# Patient Record
Sex: MALE | Race: BLACK OR AFRICAN AMERICAN | NOT HISPANIC OR LATINO | URBAN - METROPOLITAN AREA
[De-identification: names, ages, dates, MRNs, and addresses within clinical notes are randomized per-mention and may not be internally consistent; named-entity substitution may affect disease eponyms.]

---

## 2021-03-03 ENCOUNTER — OFFICE VISIT (OUTPATIENT)
Dept: DERMATOLOGY | Facility: CLINIC | Age: 41
End: 2021-03-03
Payer: COMMERCIAL

## 2021-03-03 ENCOUNTER — PATIENT MESSAGE (OUTPATIENT)
Dept: DERMATOLOGY | Facility: CLINIC | Age: 41
End: 2021-03-03

## 2021-03-03 DIAGNOSIS — L20.89 OTHER ATOPIC DERMATITIS: Primary | ICD-10-CM

## 2021-03-03 DIAGNOSIS — Z79.899 ENCOUNTER FOR LONG-TERM (CURRENT) USE OF MEDICATIONS: ICD-10-CM

## 2021-03-03 PROCEDURE — 99204 OFFICE O/P NEW MOD 45 MIN: CPT | Mod: 95,,, | Performed by: DERMATOLOGY

## 2021-03-03 PROCEDURE — 99204 PR OFFICE/OUTPT VISIT, NEW, LEVL IV, 45-59 MIN: ICD-10-PCS | Mod: 95,,, | Performed by: DERMATOLOGY

## 2021-03-03 RX ORDER — TRIAMCINOLONE ACETONIDE 1 MG/G
OINTMENT TOPICAL
COMMUNITY
End: 2022-04-21 | Stop reason: SDUPTHER

## 2021-03-04 ENCOUNTER — LAB VISIT (OUTPATIENT)
Dept: LAB | Facility: HOSPITAL | Age: 41
End: 2021-03-04
Attending: DERMATOLOGY
Payer: COMMERCIAL

## 2021-03-04 DIAGNOSIS — L20.89 OTHER ATOPIC DERMATITIS: ICD-10-CM

## 2021-03-04 DIAGNOSIS — Z79.899 ENCOUNTER FOR LONG-TERM (CURRENT) USE OF MEDICATIONS: ICD-10-CM

## 2021-03-04 PROCEDURE — 85025 COMPLETE CBC W/AUTO DIFF WBC: CPT | Performed by: DERMATOLOGY

## 2021-03-04 PROCEDURE — 36415 COLL VENOUS BLD VENIPUNCTURE: CPT | Performed by: DERMATOLOGY

## 2021-03-04 PROCEDURE — 80053 COMPREHEN METABOLIC PANEL: CPT | Performed by: DERMATOLOGY

## 2021-03-05 LAB
ALBUMIN SERPL BCP-MCNC: 4 G/DL (ref 3.5–5.2)
ALP SERPL-CCNC: 78 U/L (ref 55–135)
ALT SERPL W/O P-5'-P-CCNC: 46 U/L (ref 10–44)
ANION GAP SERPL CALC-SCNC: 11 MMOL/L (ref 8–16)
AST SERPL-CCNC: 36 U/L (ref 10–40)
BASOPHILS # BLD AUTO: 0.11 K/UL (ref 0–0.2)
BASOPHILS NFR BLD: 1.6 % (ref 0–1.9)
BILIRUB SERPL-MCNC: 0.7 MG/DL (ref 0.1–1)
BUN SERPL-MCNC: 16 MG/DL (ref 6–20)
CALCIUM SERPL-MCNC: 10.2 MG/DL (ref 8.7–10.5)
CHLORIDE SERPL-SCNC: 103 MMOL/L (ref 95–110)
CO2 SERPL-SCNC: 25 MMOL/L (ref 23–29)
CREAT SERPL-MCNC: 0.9 MG/DL (ref 0.5–1.4)
DIFFERENTIAL METHOD: ABNORMAL
EOSINOPHIL # BLD AUTO: 0.7 K/UL (ref 0–0.5)
EOSINOPHIL NFR BLD: 9.3 % (ref 0–8)
ERYTHROCYTE [DISTWIDTH] IN BLOOD BY AUTOMATED COUNT: 13.5 % (ref 11.5–14.5)
EST. GFR  (AFRICAN AMERICAN): >60 ML/MIN/1.73 M^2
EST. GFR  (NON AFRICAN AMERICAN): >60 ML/MIN/1.73 M^2
GLUCOSE SERPL-MCNC: 89 MG/DL (ref 70–110)
HCT VFR BLD AUTO: 44.4 % (ref 40–54)
HGB BLD-MCNC: 14.6 G/DL (ref 14–18)
IMM GRANULOCYTES # BLD AUTO: 0.01 K/UL (ref 0–0.04)
IMM GRANULOCYTES NFR BLD AUTO: 0.1 % (ref 0–0.5)
LYMPHOCYTES # BLD AUTO: 2.4 K/UL (ref 1–4.8)
LYMPHOCYTES NFR BLD: 33.6 % (ref 18–48)
MCH RBC QN AUTO: 30.4 PG (ref 27–31)
MCHC RBC AUTO-ENTMCNC: 32.9 G/DL (ref 32–36)
MCV RBC AUTO: 92 FL (ref 82–98)
MONOCYTES # BLD AUTO: 0.7 K/UL (ref 0.3–1)
MONOCYTES NFR BLD: 10.1 % (ref 4–15)
NEUTROPHILS # BLD AUTO: 3.2 K/UL (ref 1.8–7.7)
NEUTROPHILS NFR BLD: 45.3 % (ref 38–73)
NRBC BLD-RTO: 0 /100 WBC
PLATELET # BLD AUTO: 403 K/UL (ref 150–350)
PMV BLD AUTO: 10.2 FL (ref 9.2–12.9)
POTASSIUM SERPL-SCNC: 3.9 MMOL/L (ref 3.5–5.1)
PROT SERPL-MCNC: 8.6 G/DL (ref 6–8.4)
RBC # BLD AUTO: 4.81 M/UL (ref 4.6–6.2)
SODIUM SERPL-SCNC: 139 MMOL/L (ref 136–145)
WBC # BLD AUTO: 7 K/UL (ref 3.9–12.7)

## 2021-03-08 DIAGNOSIS — L20.89 OTHER ATOPIC DERMATITIS: Primary | ICD-10-CM

## 2021-03-08 DIAGNOSIS — Z79.899 ENCOUNTER FOR LONG-TERM (CURRENT) USE OF MEDICATIONS: ICD-10-CM

## 2021-03-08 RX ORDER — DUPILUMAB 300 MG/2ML
INJECTION, SOLUTION SUBCUTANEOUS
Qty: 4 ML | Refills: 2 | Status: SHIPPED | OUTPATIENT
Start: 2021-03-08 | End: 2021-07-27 | Stop reason: SDUPTHER

## 2021-03-08 RX ORDER — DUPILUMAB 300 MG/2ML
INJECTION, SOLUTION SUBCUTANEOUS
Qty: 8 ML | Refills: 0 | Status: SHIPPED | OUTPATIENT
Start: 2021-03-08 | End: 2021-09-10

## 2021-03-11 ENCOUNTER — SPECIALTY PHARMACY (OUTPATIENT)
Dept: PHARMACY | Facility: CLINIC | Age: 41
End: 2021-03-11

## 2021-03-11 DIAGNOSIS — L20.9 ATOPIC DERMATITIS, UNSPECIFIED TYPE: Primary | ICD-10-CM

## 2021-03-20 ENCOUNTER — IMMUNIZATION (OUTPATIENT)
Dept: INTERNAL MEDICINE | Facility: CLINIC | Age: 41
End: 2021-03-20
Payer: COMMERCIAL

## 2021-03-20 DIAGNOSIS — Z23 NEED FOR VACCINATION: Primary | ICD-10-CM

## 2021-03-20 PROCEDURE — 91300 COVID-19, MRNA, LNP-S, PF, 30 MCG/0.3 ML DOSE VACCINE: CPT | Mod: PBBFAC | Performed by: FAMILY MEDICINE

## 2021-03-22 ENCOUNTER — SPECIALTY PHARMACY (OUTPATIENT)
Dept: PHARMACY | Facility: CLINIC | Age: 41
End: 2021-03-22

## 2021-03-22 DIAGNOSIS — L20.9 ATOPIC DERMATITIS, UNSPECIFIED TYPE: Primary | ICD-10-CM

## 2021-04-10 ENCOUNTER — IMMUNIZATION (OUTPATIENT)
Dept: INTERNAL MEDICINE | Facility: CLINIC | Age: 41
End: 2021-04-10
Payer: COMMERCIAL

## 2021-04-10 DIAGNOSIS — Z23 NEED FOR VACCINATION: Primary | ICD-10-CM

## 2021-04-10 PROCEDURE — 91300 COVID-19, MRNA, LNP-S, PF, 30 MCG/0.3 ML DOSE VACCINE: CPT | Mod: PBBFAC | Performed by: FAMILY MEDICINE

## 2021-04-10 PROCEDURE — 0002A COVID-19, MRNA, LNP-S, PF, 30 MCG/0.3 ML DOSE VACCINE: CPT | Mod: PBBFAC | Performed by: FAMILY MEDICINE

## 2021-04-26 ENCOUNTER — SPECIALTY PHARMACY (OUTPATIENT)
Dept: PHARMACY | Facility: CLINIC | Age: 41
End: 2021-04-26

## 2021-05-27 ENCOUNTER — SPECIALTY PHARMACY (OUTPATIENT)
Dept: PHARMACY | Facility: CLINIC | Age: 41
End: 2021-05-27

## 2021-06-24 ENCOUNTER — SPECIALTY PHARMACY (OUTPATIENT)
Dept: PHARMACY | Facility: CLINIC | Age: 41
End: 2021-06-24

## 2021-06-24 ENCOUNTER — PATIENT MESSAGE (OUTPATIENT)
Dept: PHARMACY | Facility: CLINIC | Age: 41
End: 2021-06-24

## 2021-07-27 ENCOUNTER — SPECIALTY PHARMACY (OUTPATIENT)
Dept: PHARMACY | Facility: CLINIC | Age: 41
End: 2021-07-27

## 2021-07-27 DIAGNOSIS — Z79.899 ENCOUNTER FOR LONG-TERM (CURRENT) USE OF MEDICATIONS: ICD-10-CM

## 2021-07-27 DIAGNOSIS — L20.89 OTHER ATOPIC DERMATITIS: ICD-10-CM

## 2021-07-27 RX ORDER — DUPILUMAB 300 MG/2ML
INJECTION, SOLUTION SUBCUTANEOUS
Qty: 4 ML | Refills: 0 | Status: SHIPPED | OUTPATIENT
Start: 2021-07-27 | End: 2021-09-07 | Stop reason: SDUPTHER

## 2021-07-29 ENCOUNTER — SPECIALTY PHARMACY (OUTPATIENT)
Dept: PHARMACY | Facility: CLINIC | Age: 41
End: 2021-07-29

## 2021-07-29 ENCOUNTER — PATIENT MESSAGE (OUTPATIENT)
Dept: PHARMACY | Facility: CLINIC | Age: 41
End: 2021-07-29

## 2021-09-07 DIAGNOSIS — L20.89 OTHER ATOPIC DERMATITIS: ICD-10-CM

## 2021-09-07 DIAGNOSIS — Z79.899 ENCOUNTER FOR LONG-TERM (CURRENT) USE OF MEDICATIONS: ICD-10-CM

## 2021-09-07 RX ORDER — DUPILUMAB 300 MG/2ML
INJECTION, SOLUTION SUBCUTANEOUS
Qty: 4 ML | Refills: 0 | Status: SHIPPED | OUTPATIENT
Start: 2021-09-07 | End: 2021-09-10 | Stop reason: SDUPTHER

## 2021-09-10 ENCOUNTER — OFFICE VISIT (OUTPATIENT)
Dept: DERMATOLOGY | Facility: CLINIC | Age: 41
End: 2021-09-10
Payer: COMMERCIAL

## 2021-09-10 ENCOUNTER — SPECIALTY PHARMACY (OUTPATIENT)
Dept: PHARMACY | Facility: CLINIC | Age: 41
End: 2021-09-10

## 2021-09-10 DIAGNOSIS — Z91.013 FISH ALLERGY: ICD-10-CM

## 2021-09-10 DIAGNOSIS — Z79.899 ENCOUNTER FOR LONG-TERM (CURRENT) USE OF MEDICATIONS: ICD-10-CM

## 2021-09-10 DIAGNOSIS — L20.89 OTHER ATOPIC DERMATITIS: Primary | ICD-10-CM

## 2021-09-10 PROCEDURE — 1160F PR REVIEW ALL MEDS BY PRESCRIBER/CLIN PHARMACIST DOCUMENTED: ICD-10-PCS | Mod: CPTII,,, | Performed by: DERMATOLOGY

## 2021-09-10 PROCEDURE — 1159F MED LIST DOCD IN RCRD: CPT | Mod: CPTII,,, | Performed by: DERMATOLOGY

## 2021-09-10 PROCEDURE — 99214 PR OFFICE/OUTPT VISIT, EST, LEVL IV, 30-39 MIN: ICD-10-PCS | Mod: 95,,, | Performed by: DERMATOLOGY

## 2021-09-10 PROCEDURE — 1159F PR MEDICATION LIST DOCUMENTED IN MEDICAL RECORD: ICD-10-PCS | Mod: CPTII,,, | Performed by: DERMATOLOGY

## 2021-09-10 PROCEDURE — 99214 OFFICE O/P EST MOD 30 MIN: CPT | Mod: 95,,, | Performed by: DERMATOLOGY

## 2021-09-10 PROCEDURE — 1160F RVW MEDS BY RX/DR IN RCRD: CPT | Mod: CPTII,,, | Performed by: DERMATOLOGY

## 2021-09-10 RX ORDER — DUPILUMAB 300 MG/2ML
INJECTION, SOLUTION SUBCUTANEOUS
Qty: 4 ML | Refills: 5 | Status: SHIPPED | OUTPATIENT
Start: 2021-09-10 | End: 2022-02-23 | Stop reason: SDUPTHER

## 2021-09-15 ENCOUNTER — LAB VISIT (OUTPATIENT)
Dept: LAB | Facility: HOSPITAL | Age: 41
End: 2021-09-15
Attending: DERMATOLOGY
Payer: COMMERCIAL

## 2021-09-15 DIAGNOSIS — L20.89 OTHER ATOPIC DERMATITIS: ICD-10-CM

## 2021-09-15 DIAGNOSIS — Z79.899 ENCOUNTER FOR LONG-TERM (CURRENT) USE OF MEDICATIONS: ICD-10-CM

## 2021-09-15 LAB
ALBUMIN SERPL BCP-MCNC: 3.9 G/DL (ref 3.5–5.2)
ALP SERPL-CCNC: 70 U/L (ref 55–135)
ALT SERPL W/O P-5'-P-CCNC: 33 U/L (ref 10–44)
ANION GAP SERPL CALC-SCNC: 10 MMOL/L (ref 8–16)
AST SERPL-CCNC: 26 U/L (ref 10–40)
BASOPHILS # BLD AUTO: 0.14 K/UL (ref 0–0.2)
BASOPHILS NFR BLD: 2.1 % (ref 0–1.9)
BILIRUB SERPL-MCNC: 0.8 MG/DL (ref 0.1–1)
BUN SERPL-MCNC: 8 MG/DL (ref 6–20)
CALCIUM SERPL-MCNC: 10.4 MG/DL (ref 8.7–10.5)
CHLORIDE SERPL-SCNC: 103 MMOL/L (ref 95–110)
CO2 SERPL-SCNC: 23 MMOL/L (ref 23–29)
CREAT SERPL-MCNC: 0.9 MG/DL (ref 0.5–1.4)
DIFFERENTIAL METHOD: ABNORMAL
EOSINOPHIL # BLD AUTO: 0.4 K/UL (ref 0–0.5)
EOSINOPHIL NFR BLD: 5.6 % (ref 0–8)
ERYTHROCYTE [DISTWIDTH] IN BLOOD BY AUTOMATED COUNT: 13.2 % (ref 11.5–14.5)
EST. GFR  (AFRICAN AMERICAN): >60 ML/MIN/1.73 M^2
EST. GFR  (NON AFRICAN AMERICAN): >60 ML/MIN/1.73 M^2
GLUCOSE SERPL-MCNC: 191 MG/DL (ref 70–110)
HCT VFR BLD AUTO: 40 % (ref 40–54)
HGB BLD-MCNC: 13.6 G/DL (ref 14–18)
IMM GRANULOCYTES # BLD AUTO: 0.02 K/UL (ref 0–0.04)
IMM GRANULOCYTES NFR BLD AUTO: 0.3 % (ref 0–0.5)
LYMPHOCYTES # BLD AUTO: 2.1 K/UL (ref 1–4.8)
LYMPHOCYTES NFR BLD: 32 % (ref 18–48)
MCH RBC QN AUTO: 29.8 PG (ref 27–31)
MCHC RBC AUTO-ENTMCNC: 34 G/DL (ref 32–36)
MCV RBC AUTO: 88 FL (ref 82–98)
MONOCYTES # BLD AUTO: 0.7 K/UL (ref 0.3–1)
MONOCYTES NFR BLD: 10.9 % (ref 4–15)
NEUTROPHILS # BLD AUTO: 3.2 K/UL (ref 1.8–7.7)
NEUTROPHILS NFR BLD: 49.1 % (ref 38–73)
NRBC BLD-RTO: 0 /100 WBC
PLATELET # BLD AUTO: 345 K/UL (ref 150–450)
PMV BLD AUTO: 9.7 FL (ref 9.2–12.9)
POTASSIUM SERPL-SCNC: 3.6 MMOL/L (ref 3.5–5.1)
PROT SERPL-MCNC: 8.2 G/DL (ref 6–8.4)
RBC # BLD AUTO: 4.56 M/UL (ref 4.6–6.2)
SODIUM SERPL-SCNC: 136 MMOL/L (ref 136–145)
WBC # BLD AUTO: 6.6 K/UL (ref 3.9–12.7)

## 2021-09-15 PROCEDURE — 85025 COMPLETE CBC W/AUTO DIFF WBC: CPT | Performed by: DERMATOLOGY

## 2021-09-15 PROCEDURE — 80053 COMPREHEN METABOLIC PANEL: CPT | Performed by: DERMATOLOGY

## 2021-09-15 PROCEDURE — 36415 COLL VENOUS BLD VENIPUNCTURE: CPT | Performed by: DERMATOLOGY

## 2021-09-16 ENCOUNTER — SPECIALTY PHARMACY (OUTPATIENT)
Dept: PHARMACY | Facility: CLINIC | Age: 41
End: 2021-09-16

## 2021-09-20 ENCOUNTER — LAB VISIT (OUTPATIENT)
Dept: LAB | Facility: HOSPITAL | Age: 41
End: 2021-09-20
Attending: ALLERGY & IMMUNOLOGY
Payer: COMMERCIAL

## 2021-09-20 ENCOUNTER — OFFICE VISIT (OUTPATIENT)
Dept: ALLERGY | Facility: CLINIC | Age: 41
End: 2021-09-20
Payer: COMMERCIAL

## 2021-09-20 VITALS
WEIGHT: 315 LBS | DIASTOLIC BLOOD PRESSURE: 100 MMHG | BODY MASS INDEX: 42.66 KG/M2 | HEIGHT: 72 IN | SYSTOLIC BLOOD PRESSURE: 146 MMHG | TEMPERATURE: 98 F | HEART RATE: 107 BPM

## 2021-09-20 DIAGNOSIS — Z91.013 FISH ALLERGY: Primary | ICD-10-CM

## 2021-09-20 DIAGNOSIS — J30.1 SEASONAL ALLERGIC RHINITIS DUE TO POLLEN: ICD-10-CM

## 2021-09-20 DIAGNOSIS — T78.1XXD POLLEN-FOOD ALLERGY, SUBSEQUENT ENCOUNTER: ICD-10-CM

## 2021-09-20 DIAGNOSIS — Z91.013 FISH ALLERGY: ICD-10-CM

## 2021-09-20 DIAGNOSIS — Z91.018 FOOD ALLERGY: ICD-10-CM

## 2021-09-20 PROCEDURE — 36415 COLL VENOUS BLD VENIPUNCTURE: CPT | Performed by: ALLERGY & IMMUNOLOGY

## 2021-09-20 PROCEDURE — 3077F SYST BP >= 140 MM HG: CPT | Mod: CPTII,S$GLB,, | Performed by: ALLERGY & IMMUNOLOGY

## 2021-09-20 PROCEDURE — 3077F PR MOST RECENT SYSTOLIC BLOOD PRESSURE >= 140 MM HG: ICD-10-PCS | Mod: CPTII,S$GLB,, | Performed by: ALLERGY & IMMUNOLOGY

## 2021-09-20 PROCEDURE — 82785 ASSAY OF IGE: CPT | Performed by: ALLERGY & IMMUNOLOGY

## 2021-09-20 PROCEDURE — 3080F PR MOST RECENT DIASTOLIC BLOOD PRESSURE >= 90 MM HG: ICD-10-PCS | Mod: CPTII,S$GLB,, | Performed by: ALLERGY & IMMUNOLOGY

## 2021-09-20 PROCEDURE — 86003 ALLG SPEC IGE CRUDE XTRC EA: CPT | Mod: 59 | Performed by: ALLERGY & IMMUNOLOGY

## 2021-09-20 PROCEDURE — 99999 PR PBB SHADOW E&M-EST. PATIENT-LVL IV: ICD-10-PCS | Mod: PBBFAC,,, | Performed by: ALLERGY & IMMUNOLOGY

## 2021-09-20 PROCEDURE — 86003 ALLG SPEC IGE CRUDE XTRC EA: CPT | Performed by: ALLERGY & IMMUNOLOGY

## 2021-09-20 PROCEDURE — 99999 PR PBB SHADOW E&M-EST. PATIENT-LVL IV: CPT | Mod: PBBFAC,,, | Performed by: ALLERGY & IMMUNOLOGY

## 2021-09-20 PROCEDURE — 1159F MED LIST DOCD IN RCRD: CPT | Mod: CPTII,S$GLB,, | Performed by: ALLERGY & IMMUNOLOGY

## 2021-09-20 PROCEDURE — 99204 OFFICE O/P NEW MOD 45 MIN: CPT | Mod: S$GLB,,, | Performed by: ALLERGY & IMMUNOLOGY

## 2021-09-20 PROCEDURE — 3008F PR BODY MASS INDEX (BMI) DOCUMENTED: ICD-10-PCS | Mod: CPTII,S$GLB,, | Performed by: ALLERGY & IMMUNOLOGY

## 2021-09-20 PROCEDURE — 3008F BODY MASS INDEX DOCD: CPT | Mod: CPTII,S$GLB,, | Performed by: ALLERGY & IMMUNOLOGY

## 2021-09-20 PROCEDURE — 3080F DIAST BP >= 90 MM HG: CPT | Mod: CPTII,S$GLB,, | Performed by: ALLERGY & IMMUNOLOGY

## 2021-09-20 PROCEDURE — 99204 PR OFFICE/OUTPT VISIT, NEW, LEVL IV, 45-59 MIN: ICD-10-PCS | Mod: S$GLB,,, | Performed by: ALLERGY & IMMUNOLOGY

## 2021-09-20 PROCEDURE — 1159F PR MEDICATION LIST DOCUMENTED IN MEDICAL RECORD: ICD-10-PCS | Mod: CPTII,S$GLB,, | Performed by: ALLERGY & IMMUNOLOGY

## 2021-09-20 RX ORDER — AMOXICILLIN 500 MG/1
CAPSULE ORAL
COMMUNITY
Start: 2021-09-09 | End: 2023-02-23

## 2021-09-20 RX ORDER — OLMESARTAN MEDOXOMIL, AMLODIPINE AND HYDROCHLOROTHIAZIDE TABLET 40/10/25 MG 40; 10; 25 MG/1; MG/1; MG/1
1 TABLET ORAL DAILY
COMMUNITY
Start: 2021-08-06 | End: 2022-08-04 | Stop reason: SDUPTHER

## 2021-09-20 RX ORDER — IBUPROFEN 800 MG/1
800 TABLET ORAL EVERY 8 HOURS PRN
COMMUNITY
Start: 2021-09-09 | End: 2022-08-04

## 2021-09-20 RX ORDER — TIZANIDINE 4 MG/1
4 TABLET ORAL NIGHTLY
COMMUNITY
Start: 2021-07-20

## 2021-09-20 RX ORDER — ACETAMINOPHEN AND CODEINE PHOSPHATE 300; 30 MG/1; MG/1
1 TABLET ORAL
COMMUNITY
Start: 2021-09-09 | End: 2023-03-30

## 2021-09-20 RX ORDER — EPINEPHRINE 0.3 MG/.3ML
1 INJECTION SUBCUTANEOUS ONCE
Qty: 2 DEVICE | Refills: 3 | Status: SHIPPED | OUTPATIENT
Start: 2021-09-20 | End: 2023-11-02

## 2021-09-21 ENCOUNTER — PATIENT MESSAGE (OUTPATIENT)
Dept: ALLERGY | Facility: CLINIC | Age: 41
End: 2021-09-21

## 2021-09-21 LAB — IGE SERPL-ACNC: 198 IU/ML (ref 0–100)

## 2021-09-22 LAB — AMER SYCAMORE IGE QN: 1.09 KU/L

## 2021-09-23 LAB
A ALTERNATA IGE QN: 0.31 KU/L
A FUMIGATUS IGE QN: 0.31 KU/L
ALLERGEN BOXELDER MAPLE TREE IGE: 1.25 KU/L
ALLERGEN CHAETOMIUM GLOBOSUM IGE: <0.1 KU/L
ALLERGEN MAPLE (BOX ELDER) CLASS: ABNORMAL
ALLERGEN MULBERRY CLASS: ABNORMAL
ALLERGEN MULBERRY TREE IGE: 0.26 KU/L
ALLERGEN WHITE ASH TREE IGE: 1.27 KU/L
ALLERGEN WHITE PINE TREE IGE: 0.1 KU/L
BAHIA GRASS IGE QN: 21.7 KU/L
BALD CYPRESS IGE QN: 0.12 KU/L
C HERBARUM IGE QN: 0.11 KU/L
C LUNATA IGE QN: 0.44 KU/L
CAT DANDER IGE QN: <0.1 KU/L
CATFISH IGE QN: 0.56 KU/L
CHAETOMIUM GLOB. CLASS: NORMAL
COCKLEBUR IGE QN: 2.78 KU/L
COCKSFOOT IGE QN: 13.7 KU/L
CODFISH IGE QN: 0.49 KU/L
COMMON RAGWEED IGE QN: 20.5 KU/L
COTTONWOOD IGE QN: 0.66 KU/L
D FARINAE IGE QN: 0.22 KU/L
D PTERONYSS IGE QN: 0.3 KU/L
DEPRECATED A ALTERNATA IGE RAST QL: ABNORMAL
DEPRECATED A FUMIGATUS IGE RAST QL: ABNORMAL
DEPRECATED BAHIA GRASS IGE RAST QL: ABNORMAL
DEPRECATED BALD CYPRESS IGE RAST QL: ABNORMAL
DEPRECATED C HERBARUM IGE RAST QL: ABNORMAL
DEPRECATED C LUNATA IGE RAST QL: ABNORMAL
DEPRECATED CAT DANDER IGE RAST QL: NORMAL
DEPRECATED CATFISH IGE RAST QL: ABNORMAL
DEPRECATED COCKLEBUR IGE RAST QL: ABNORMAL
DEPRECATED COCKSFOOT IGE RAST QL: ABNORMAL
DEPRECATED CODFISH IGE RAST QL: ABNORMAL
DEPRECATED COMMON RAGWEED IGE RAST QL: ABNORMAL
DEPRECATED COTTONWOOD IGE RAST QL: ABNORMAL
DEPRECATED D FARINAE IGE RAST QL: ABNORMAL
DEPRECATED D PTERONYSS IGE RAST QL: ABNORMAL
DEPRECATED DOG DANDER IGE RAST QL: ABNORMAL
DEPRECATED ELDER IGE RAST QL: ABNORMAL
DEPRECATED ENGL PLANTAIN IGE RAST QL: ABNORMAL
DEPRECATED GUM-TREE IGE RAST QL: ABNORMAL
DEPRECATED HACKBERRY TREE IGE RAST QL: ABNORMAL
DEPRECATED JOHNSON GRASS IGE RAST QL: ABNORMAL
DEPRECATED KENT BLUE GRASS IGE RAST QL: ABNORMAL
DEPRECATED LONDON PLANE IGE RAST QL: ABNORMAL
DEPRECATED MUGWORT IGE RAST QL: ABNORMAL
DEPRECATED NETTLE IGE RAST QL: ABNORMAL
DEPRECATED PECAN/HICK NUT IGE RAST QL: NORMAL
DEPRECATED PECAN/HICK TREE IGE RAST QL: ABNORMAL
DEPRECATED PER RYE GRASS IGE RAST QL: ABNORMAL
DEPRECATED PRIVET IGE RAST QL: ABNORMAL
DEPRECATED RED TOP GRASS IGE RAST QL: ABNORMAL
DEPRECATED ROACH IGE RAST QL: ABNORMAL
DEPRECATED SALMON IGE RAST QL: ABNORMAL
DEPRECATED SALTWORT IGE RAST QL: ABNORMAL
DEPRECATED SHEEP SORREL IGE RAST QL: ABNORMAL
DEPRECATED SILVER BIRCH IGE RAST QL: ABNORMAL
DEPRECATED TIMOTHY IGE RAST QL: ABNORMAL
DEPRECATED TUNA IGE RAST QL: NORMAL
DEPRECATED WALNUT IGE RAST QL: ABNORMAL
DEPRECATED WHITE OAK IGE RAST QL: ABNORMAL
DEPRECATED WILLOW IGE RAST QL: ABNORMAL
DOG DANDER IGE QN: 0.19 KU/L
ELDER IGE QN: 0.55 KU/L
ELM CEDAR CLASS: ABNORMAL
ELM CEDAR, IGE: 0.56 KU/L
ENGL PLANTAIN IGE QN: 0.5 KU/L
GUM-TREE IGE QN: 0.71 KU/L
HACKBERRY TREE IGE QN: 1.38 KU/L
JOHNSON GRASS IGE QN: 8.25 KU/L
KENT BLUE GRASS IGE QN: 18.4 KU/L
LONDON PLANE IGE QN: 0.81 KU/L
MUGWORT IGE QN: 0.49 KU/L
NETTLE IGE QN: 0.28 KU/L
PECAN/HICK NUT IGE QN: <0.1 KU/L
PECAN/HICK TREE IGE QN: 6.91 KU/L
PER RYE GRASS IGE QN: 17.2 KU/L
PRIVET IGE QN: 0.28 KU/L
RED TOP GRASS IGE QN: 14.7 KU/L
ROACH IGE QN: 0.19 KU/L
SALMON IGE QN: 0.25 KU/L
SALTWORT IGE QN: 0.39 KU/L
SHEEP SORREL IGE QN: 0.39 KU/L
SILVER BIRCH IGE QN: 7.63 KU/L
STEMPHYLIUM HERBARUM CLASS: ABNORMAL
STEMPHYLLIUM, IGE: 0.44 KU/L
TIMOTHY IGE QN: 15.8 KU/L
TUNA IGE QN: 0.1 KU/L
WALNUT IGE QN: 0.22 KU/L
WHITE ASH CLASS: ABNORMAL
WHITE OAK IGE QN: 4.3 KU/L
WHITE PINE CLASS: NORMAL
WILLOW IGE QN: 0.42 KU/L

## 2021-10-08 ENCOUNTER — SPECIALTY PHARMACY (OUTPATIENT)
Dept: PHARMACY | Facility: CLINIC | Age: 41
End: 2021-10-08

## 2021-11-04 ENCOUNTER — SPECIALTY PHARMACY (OUTPATIENT)
Dept: PHARMACY | Facility: CLINIC | Age: 41
End: 2021-11-04
Payer: COMMERCIAL

## 2021-12-03 ENCOUNTER — SPECIALTY PHARMACY (OUTPATIENT)
Dept: PHARMACY | Facility: CLINIC | Age: 41
End: 2021-12-03
Payer: COMMERCIAL

## 2021-12-30 ENCOUNTER — SPECIALTY PHARMACY (OUTPATIENT)
Dept: PHARMACY | Facility: CLINIC | Age: 41
End: 2021-12-30
Payer: COMMERCIAL

## 2022-01-27 ENCOUNTER — PATIENT MESSAGE (OUTPATIENT)
Dept: PHARMACY | Facility: CLINIC | Age: 42
End: 2022-01-27
Payer: COMMERCIAL

## 2022-01-27 ENCOUNTER — SPECIALTY PHARMACY (OUTPATIENT)
Dept: PHARMACY | Facility: CLINIC | Age: 42
End: 2022-01-27
Payer: COMMERCIAL

## 2022-01-28 NOTE — TELEPHONE ENCOUNTER
Specialty Pharmacy - Refill Coordination    Specialty Medication Orders Linked to Encounter    Flowsheet Row Most Recent Value   Medication #1 DUPIXENT SYRINGE 300 mg/2 mL Syrg (Order#[id number], Rx#[id number])          Refill Questions - Documented Responses    Flowsheet Row Most Recent Value   Patient Availability and HIPAA Verification    Does patient want to proceed with activity? Yes   HIPAA/medical authority confirmed? Yes   Relationship to patient of person spoken to? Self   Refill Screening Questions    Changes to allergies? No   Changes to medications? No   Unplanned office visit, urgent care, ED, or hospital admission in the last 4 weeks? No   Financial problems or insurance changes? No   How many doses of your specialty medications were missed in the last 4 weeks? 0   Would patient like to speak to a pharmacist? No   When does the patient need to receive the medication? 02/02/22   Refill Delivery Questions    How will the patient receive the medication? Delivery Jacklyn   When does the patient need to receive the medication? 02/02/22   Shipping Address Home   Address in [facility] confirmed and updated if neccessary? Yes   Expected Copay ($) 0   Is the patient able to afford the medication copay? Yes   Payment Method zero copay   Days supply of Refill 28   Supplies needed? No supplies needed   Refill activity completed? Yes   Refill activity plan Refill scheduled   Shipment/Pickup Date: 02/01/22          Current Outpatient Medications   Medication Sig    acetaminophen-codeine 300-30mg (TYLENOL #3) 300-30 mg Tab Take 1 tablet by mouth every 4 to 6 hours as needed.    amoxicillin (AMOXIL) 500 MG capsule TAKE 1 CAPSULE BY MOUTH THREE TIMES DAILY UNTIL GONE    DUPIXENT SYRINGE 300 mg/2 mL Syrg Inject 300mg (2ml) into the skin every other week.    EPINEPHrine (EPIPEN) 0.3 mg/0.3 mL AtIn Inject 0.3 mLs (0.3 mg total) into the muscle once. for 1 dose    ibuprofen (ADVIL,MOTRIN) 800 MG tablet Take 800 mg  by mouth every 8 (eight) hours as needed.    olmesartan-amLODIPin-hcthiazid 40-10-25 mg Tab Take 1 tablet by mouth once daily.    OPW TEST CLAIM - DO NOT FILL OPW test claim. Do not fill.    tiZANidine (ZANAFLEX) 4 MG tablet Take 4 mg by mouth nightly.    triamcinolone acetonide 0.1% (KENALOG) 0.1 % ointment    Last reviewed on 9/20/2021  1:35 PM by Sepideh Koehler MA    Review of patient's allergies indicates:   Allergen Reactions    Fish containing products Itching, Nausea Only and Swelling    Last reviewed on  9/20/2021 1:33 PM by Sepideh Koehler      Tasks added this encounter   2/23/2022 - Refill Call (Auto Added)   Tasks due within next 3 months   No tasks due.     Anita Rao - Specialty Pharmacy  [address] [facility] [address]  Phone: [phone]  Fax: [phone]

## 2022-02-23 ENCOUNTER — SPECIALTY PHARMACY (OUTPATIENT)
Dept: PHARMACY | Facility: CLINIC | Age: 42
End: 2022-02-23
Payer: COMMERCIAL

## 2022-02-23 DIAGNOSIS — L20.89 OTHER ATOPIC DERMATITIS: ICD-10-CM

## 2022-02-23 DIAGNOSIS — Z79.899 ENCOUNTER FOR LONG-TERM (CURRENT) USE OF MEDICATIONS: ICD-10-CM

## 2022-02-23 RX ORDER — DUPILUMAB 300 MG/2ML
INJECTION, SOLUTION SUBCUTANEOUS
Qty: 4 ML | Refills: 5 | Status: SHIPPED | OUTPATIENT
Start: 2022-02-23 | End: 2022-08-29 | Stop reason: SDUPTHER

## 2022-02-28 ENCOUNTER — PATIENT MESSAGE (OUTPATIENT)
Dept: ALLERGY | Facility: CLINIC | Age: 42
End: 2022-02-28
Payer: COMMERCIAL

## 2022-03-04 ENCOUNTER — SPECIALTY PHARMACY (OUTPATIENT)
Dept: PHARMACY | Facility: CLINIC | Age: 42
End: 2022-03-04
Payer: COMMERCIAL

## 2022-03-04 NOTE — TELEPHONE ENCOUNTER
Dupixent PA approval. [employer] commercial insurance. PA on file until 7/19/22. $80.00 copayment.  copayment card yields $0.00 copayment.      Copayment $80.00  Deductible $0.00  Max OOP $3500

## 2022-03-04 NOTE — TELEPHONE ENCOUNTER
Specialty Pharmacy - Refill Coordination    Specialty Medication Orders Linked to Encounter    Flowsheet Row Most Recent Value   Medication #1 DUPIXENT SYRINGE 300 mg/2 mL Syrg (Order#[id number], Rx#[id number])          Refill Questions - Documented Responses    Flowsheet Row Most Recent Value   Patient Availability and HIPAA Verification    Does patient want to proceed with activity? Yes   HIPAA/medical authority confirmed? Yes   Relationship to patient of person spoken to? Self   Refill Screening Questions    Changes to allergies? No   Changes to medications? No   New conditions since last clinic visit? No   Unplanned office visit, urgent care, ED, or hospital admission in the last 4 weeks? No   How does patient/caregiver feel medication is working? Excellent   Financial problems or insurance changes? No   How many doses of your specialty medications were missed in the last 4 weeks? 0   Would patient like to speak to a pharmacist? No   When does the patient need to receive the medication? 03/08/22   Refill Delivery Questions    How will the patient receive the medication? Delivery Jacklyn   When does the patient need to receive the medication? 03/08/22   Shipping Address Home   Address in [facility] confirmed and updated if neccessary? Yes   Expected Copay ($) 0   Is the patient able to afford the medication copay? Yes   Payment Method zero copay   Days supply of Refill 28   Supplies needed? No supplies needed   Refill activity completed? Yes   Refill activity plan Refill scheduled   Shipment/Pickup Date: 03/08/22          Current Outpatient Medications   Medication Sig    acetaminophen-codeine 300-30mg (TYLENOL #3) 300-30 mg Tab Take 1 tablet by mouth every 4 to 6 hours as needed.    amoxicillin (AMOXIL) 500 MG capsule TAKE 1 CAPSULE BY MOUTH THREE TIMES DAILY UNTIL GONE    DUPIXENT SYRINGE 300 mg/2 mL Syrg Inject 300mg (2ml) into the skin every other week.    EPINEPHrine (EPIPEN) 0.3 mg/0.3 mL AtIn  Inject 0.3 mLs (0.3 mg total) into the muscle once. for 1 dose    ibuprofen (ADVIL,MOTRIN) 800 MG tablet Take 800 mg by mouth every 8 (eight) hours as needed.    olmesartan-amLODIPin-hcthiazid 40-10-25 mg Tab Take 1 tablet by mouth once daily.    OPW TEST CLAIM - DO NOT FILL OPW test claim. Do not fill.    tiZANidine (ZANAFLEX) 4 MG tablet Take 4 mg by mouth nightly.    triamcinolone acetonide 0.1% (KENALOG) 0.1 % ointment    Last reviewed on 9/20/2021  1:35 PM by Sepideh Koehler MA    Review of patient's allergies indicates:   Allergen Reactions    Fish containing products Itching, Nausea Only and Swelling    Last reviewed on  9/20/2021 1:33 PM by Sepideh Koehler      Tasks added this encounter   No tasks added.   Tasks due within next 3 months   2/23/2022 - Refill Call (Auto Added)     Papi MorrisD  [facility] - [facility]ty Pharmacy  [address] [place], [place] [address]  [facility] [address]  Phone: [phone]  Fax: [phone]

## 2022-03-29 ENCOUNTER — PATIENT MESSAGE (OUTPATIENT)
Dept: PHARMACY | Facility: CLINIC | Age: 42
End: 2022-03-29
Payer: COMMERCIAL

## 2022-03-30 ENCOUNTER — SPECIALTY PHARMACY (OUTPATIENT)
Dept: PHARMACY | Facility: CLINIC | Age: 42
End: 2022-03-30
Payer: COMMERCIAL

## 2022-03-30 NOTE — TELEPHONE ENCOUNTER
Specialty Pharmacy - Refill Coordination    Specialty Medication Orders Linked to Encounter    Flowsheet Row Most Recent Value   Medication #1 DUPIXENT SYRINGE 300 mg/2 mL Syrg (Order#[id number], Rx#[id number])          Refill Questions - Documented Responses    Flowsheet Row Most Recent Value   Patient Availability and HIPAA Verification    Does patient want to proceed with activity? Yes   HIPAA/medical authority confirmed? Yes   Relationship to patient of person spoken to? Self   Refill Screening Questions    Changes to allergies? No   Changes to medications? No   New conditions since last clinic visit? No   Unplanned office visit, urgent care, ED, or hospital admission in the last 4 weeks? No   How does patient/caregiver feel medication is working? Excellent   Financial problems or insurance changes? No   How many doses of your specialty medications were missed in the last 4 weeks? 0   Would patient like to speak to a pharmacist? No   When does the patient need to receive the medication? 04/07/22   Refill Delivery Questions    How will the patient receive the medication? Delivery Jacklyn   When does the patient need to receive the medication? 04/07/22   Shipping Address Home   Address in [facility] confirmed and updated if neccessary? Yes   Expected Copay ($) 0   Is the patient able to afford the medication copay? Yes   Payment Method zero copay   Days supply of Refill 28   Supplies needed? No supplies needed   Refill activity completed? Yes   Refill activity plan Refill scheduled   Shipment/Pickup Date: 04/01/22          Current Outpatient Medications   Medication Sig    acetaminophen-codeine 300-30mg (TYLENOL #3) 300-30 mg Tab Take 1 tablet by mouth every 4 to 6 hours as needed.    amoxicillin (AMOXIL) 500 MG capsule TAKE 1 CAPSULE BY MOUTH THREE TIMES DAILY UNTIL GONE    DUPIXENT SYRINGE 300 mg/2 mL Syrg Inject 300mg (2ml) into the skin every other week.    EPINEPHrine (EPIPEN) 0.3 mg/0.3 mL AtIn  Inject 0.3 mLs (0.3 mg total) into the muscle once. for 1 dose    ibuprofen (ADVIL,MOTRIN) 800 MG tablet Take 800 mg by mouth every 8 (eight) hours as needed.    olmesartan-amLODIPin-hcthiazid 40-10-25 mg Tab Take 1 tablet by mouth once daily.    OPW TEST CLAIM - DO NOT FILL OPW test claim. Do not fill.    tiZANidine (ZANAFLEX) 4 MG tablet Take 4 mg by mouth nightly.    triamcinolone acetonide 0.1% (KENALOG) 0.1 % ointment    Last reviewed on 9/20/2021  1:35 PM by Sepideh Koehler MA    Review of patient's allergies indicates:   Allergen Reactions    Fish containing products Itching, Nausea Only and Swelling    Last reviewed on  9/20/2021 1:33 PM by Sepideh Koehler      Tasks added this encounter   4/28/2022 - Refill Call (Auto Added)   Tasks due within next 3 months   No tasks due.     Elisabeth Ramirez, PharmD  [facility]  [address] [place] [address]  Phone: [phone]  Fax: [phone]

## 2022-04-18 ENCOUNTER — PATIENT MESSAGE (OUTPATIENT)
Dept: ADMINISTRATIVE | Facility: OTHER | Age: 42
End: 2022-04-18
Payer: COMMERCIAL

## 2022-04-21 ENCOUNTER — PATIENT MESSAGE (OUTPATIENT)
Dept: DERMATOLOGY | Facility: CLINIC | Age: 42
End: 2022-04-21
Payer: COMMERCIAL

## 2022-04-22 RX ORDER — TRIAMCINOLONE ACETONIDE 1 MG/G
OINTMENT TOPICAL
Qty: 80 G | Refills: 3 | Status: SHIPPED | OUTPATIENT
Start: 2022-04-22

## 2022-04-28 ENCOUNTER — SPECIALTY PHARMACY (OUTPATIENT)
Dept: PHARMACY | Facility: CLINIC | Age: 42
End: 2022-04-28
Payer: COMMERCIAL

## 2022-04-28 NOTE — TELEPHONE ENCOUNTER
Specialty Pharmacy - Refill Coordination    Specialty Medication Orders Linked to Encounter    Flowsheet Row Most Recent Value   Medication #1 DUPIXENT SYRINGE 300 mg/2 mL Syrg (Order#[id number], Rx#[id number])          Refill Questions - Documented Responses    Flowsheet Row Most Recent Value   Patient Availability and HIPAA Verification    Does patient want to proceed with activity? Yes   HIPAA/medical authority confirmed? Yes   Relationship to patient of person spoken to? Self   Refill Screening Questions    Changes to allergies? No   Changes to medications? No   New conditions since last clinic visit? No   Unplanned office visit, urgent care, ED, or hospital admission in the last 4 weeks? No   How does patient/caregiver feel medication is working? Excellent   Financial problems or insurance changes? No   How many doses of your specialty medications were missed in the last 4 weeks? 0   Would patient like to speak to a pharmacist? No   When does the patient need to receive the medication? 05/05/22   Refill Delivery Questions    How will the patient receive the medication? Delivery Jacklyn   When does the patient need to receive the medication? 05/05/22   Shipping Address Home   Address in [facility] confirmed and updated if neccessary? Yes   Expected Copay ($) 0   Is the patient able to afford the medication copay? Yes   Payment Method zero copay   Days supply of Refill 28   Supplies needed? No supplies needed   Refill activity completed? Yes   Refill activity plan Refill scheduled   Shipment/Pickup Date: 05/03/22          Current Outpatient Medications   Medication Sig    acetaminophen-codeine 300-30mg (TYLENOL #3) 300-30 mg Tab Take 1 tablet by mouth every 4 to 6 hours as needed.    amoxicillin (AMOXIL) 500 MG capsule TAKE 1 CAPSULE BY MOUTH THREE TIMES DAILY UNTIL GONE    DUPIXENT SYRINGE 300 mg/2 mL Syrg Inject 300mg (2ml) into the skin every other week.    EPINEPHrine (EPIPEN) 0.3 mg/0.3 mL AtIn  Inject 0.3 mLs (0.3 mg total) into the muscle once. for 1 dose    ibuprofen (ADVIL,MOTRIN) 800 MG tablet Take 800 mg by mouth every 8 (eight) hours as needed.    olmesartan-amLODIPin-hcthiazid 40-10-25 mg Tab Take 1 tablet by mouth once daily.    OPW TEST CLAIM - DO NOT FILL OPW test claim. Do not fill.    tiZANidine (ZANAFLEX) 4 MG tablet Take 4 mg by mouth nightly.    triamcinolone acetonide 0.1% (KENALOG) 0.1 % ointment AAA bid prn. Do not use on face, underarms or groin.   Last reviewed on 9/20/2021  1:35 PM by Sepideh Koehler MA    Review of patient's allergies indicates:   Allergen Reactions    Fish containing products Itching, Nausea Only and Swelling    Last reviewed on  9/20/2021 1:33 PM by Sepideh Koehler      Tasks added this encounter   5/26/2022 - Refill Call (Auto Added)   Tasks due within next 3 months   No tasks due.     Yo Trevizo gallo - Specialty Pharmacy  [address] [facility] [address]  Phone: [phone]  Fax: [phone]

## 2022-05-26 ENCOUNTER — SPECIALTY PHARMACY (OUTPATIENT)
Dept: PHARMACY | Facility: CLINIC | Age: 42
End: 2022-05-26
Payer: COMMERCIAL

## 2022-05-26 NOTE — TELEPHONE ENCOUNTER
Specialty Pharmacy - Refill Coordination    Specialty Medication Orders Linked to Encounter    Flowsheet Row Most Recent Value   Medication #1 DUPIXENT SYRINGE 300 mg/2 mL Syrg (Order#[id number], Rx#[id number])          Refill Questions - Documented Responses    Flowsheet Row Most Recent Value   Patient Availability and HIPAA Verification    Does patient want to proceed with activity? Yes   HIPAA/medical authority confirmed? Yes   Relationship to patient of person spoken to? Self   Refill Screening Questions    Changes to allergies? No   Changes to medications? No   New conditions since last clinic visit? No   Unplanned office visit, urgent care, ED, or hospital admission in the last 4 weeks? No   How does patient/caregiver feel medication is working? Good   Financial problems or insurance changes? No   How many doses of your specialty medications were missed in the last 4 weeks? 0   Would patient like to speak to a pharmacist? No   When does the patient need to receive the medication? 06/02/22   Refill Delivery Questions    How will the patient receive the medication? Delivery Jacklyn   When does the patient need to receive the medication? 06/02/22   Shipping Address Home   Address in [facility] confirmed and updated if neccessary? Yes   Expected Copay ($) 0   Is the patient able to afford the medication copay? Yes   Payment Method zero copay   Days supply of Refill 28   Supplies needed? No supplies needed   Refill activity completed? Yes   Refill activity plan Refill scheduled   Shipment/Pickup Date: 05/31/22          Current Outpatient Medications   Medication Sig    acetaminophen-codeine 300-30mg (TYLENOL #3) 300-30 mg Tab Take 1 tablet by mouth every 4 to 6 hours as needed.    amoxicillin (AMOXIL) 500 MG capsule TAKE 1 CAPSULE BY MOUTH THREE TIMES DAILY UNTIL GONE    DUPIXENT SYRINGE 300 mg/2 mL Syrg Inject 300mg (2ml) into the skin every other week.    EPINEPHrine (EPIPEN) 0.3 mg/0.3 mL AtIn Inject 0.3  mLs (0.3 mg total) into the muscle once. for 1 dose    ibuprofen (ADVIL,MOTRIN) 800 MG tablet Take 800 mg by mouth every 8 (eight) hours as needed.    olmesartan-amLODIPin-hcthiazid 40-10-25 mg Tab Take 1 tablet by mouth once daily.    OPW TEST CLAIM - DO NOT FILL OPW test claim. Do not fill.    tiZANidine (ZANAFLEX) 4 MG tablet Take 4 mg by mouth nightly.    triamcinolone acetonide 0.1% (KENALOG) 0.1 % ointment AAA bid prn. Do not use on face, underarms or groin.   Last reviewed on 9/20/2021  1:35 PM by Sepideh Koehler MA    Review of patient's allergies indicates:   Allergen Reactions    Fish containing products Itching, Nausea Only and Swelling    Last reviewed on  9/20/2021 1:33 PM by Sepideh Koehler      Tasks added this encounter   6/23/2022 - Refill Call (Auto Added)   Tasks due within next 3 months   No tasks due.     Siobhan Rao - Specialty Pharmacy  [address] [facility] [address]  Phone: [phone]  Fax: [phone]

## 2022-06-20 ENCOUNTER — SPECIALTY PHARMACY (OUTPATIENT)
Dept: PHARMACY | Facility: CLINIC | Age: 42
End: 2022-06-20
Payer: COMMERCIAL

## 2022-06-20 DIAGNOSIS — L20.9 ATOPIC DERMATITIS, UNSPECIFIED TYPE: Primary | ICD-10-CM

## 2022-06-20 NOTE — TELEPHONE ENCOUNTER
Specialty Pharmacy - Clinical Reassessment    Specialty Medication Orders Linked to Encounter    Flowsheet Row Most Recent Value   Medication #1 DUPIXENT SYRINGE 300 mg/2 mL Syrg (Order#[id number], Rx#[id number])        Patient Diagnosis   L20.9 - Atopic dermatitis, unspecified type    Specialty clinical pharmacist review completed for an annual review of reassessment. Reviewed the following areas: current med list, reports of adverse effects, adherence and progress towards therapeutic goals.    Recommendations: none at this time.    Tasks added this encounter   3/20/2023 - Clinical - Follow Up Assesement (Annual)   Tasks due within next 3 months   6/23/2022 - Refill Call (Auto Added)     Araseli Hopper, PharmD  [facility]  [address] [facility]  [facility] [address]  Phone: [phone]  Fax: [phone]

## 2022-06-23 ENCOUNTER — SPECIALTY PHARMACY (OUTPATIENT)
Dept: PHARMACY | Facility: CLINIC | Age: 42
End: 2022-06-23
Payer: COMMERCIAL

## 2022-06-23 NOTE — TELEPHONE ENCOUNTER
Specialty Pharmacy - Refill Coordination    Specialty Medication Orders Linked to Encounter    Flowsheet Row Most Recent Value   Medication #1 DUPIXENT SYRINGE 300 mg/2 mL Syrg (Order#[id number], Rx#[id number])          Refill Questions - Documented Responses    Flowsheet Row Most Recent Value   Patient Availability and HIPAA Verification    Does patient want to proceed with activity? Yes   HIPAA/medical authority confirmed? Yes   Relationship to patient of person spoken to? Self   Refill Screening Questions    Changes to allergies? No   Changes to medications? No   New conditions since last clinic visit? No   Unplanned office visit, urgent care, ED, or hospital admission in the last 4 weeks? No   How does patient/caregiver feel medication is working? Good   Financial problems or insurance changes? No   How many doses of your specialty medications were missed in the last 4 weeks? 0   Would patient like to speak to a pharmacist? No   When does the patient need to receive the medication? 06/23/22   Refill Delivery Questions    How will the patient receive the medication? Delivery Jacklyn   When does the patient need to receive the medication? 06/23/22   Shipping Address Home   Address in [facility] confirmed and updated if neccessary? Yes   Expected Copay ($) 0   Is the patient able to afford the medication copay? Yes   Payment Method zero copay   Days supply of Refill 28   Supplies needed? No supplies needed   Refill activity completed? Yes   Refill activity plan Refill scheduled   Shipment/Pickup Date: 06/24/22          Current Outpatient Medications   Medication Sig    acetaminophen-codeine 300-30mg (TYLENOL #3) 300-30 mg Tab Take 1 tablet by mouth every 4 to 6 hours as needed.    amoxicillin (AMOXIL) 500 MG capsule TAKE 1 CAPSULE BY MOUTH THREE TIMES DAILY UNTIL GONE    DUPIXENT SYRINGE 300 mg/2 mL Syrg Inject 300mg (2ml) into the skin every other week.    EPINEPHrine (EPIPEN) 0.3 mg/0.3 mL AtIn Inject 0.3  mLs (0.3 mg total) into the muscle once. for 1 dose    ibuprofen (ADVIL,MOTRIN) 800 MG tablet Take 800 mg by mouth every 8 (eight) hours as needed.    olmesartan-amLODIPin-hcthiazid 40-10-25 mg Tab Take 1 tablet by mouth once daily.    tiZANidine (ZANAFLEX) 4 MG tablet Take 4 mg by mouth nightly.    triamcinolone acetonide 0.1% (KENALOG) 0.1 % ointment AAA bid prn. Do not use on face, underarms or groin.   Last reviewed on 9/20/2021  1:35 PM by Sepideh Koehler MA    Review of patient's allergies indicates:   Allergen Reactions    Fish containing products Itching, Nausea Only and Swelling    Last reviewed on  9/20/2021 1:33 PM by Sepideh Koehler      Tasks added this encounter   7/14/2022 - Refill Call (Auto Added)   Tasks due within next 3 months   No tasks due.     [facility]han [facility]ao - Specialty Pharmacy  [address]  [facility] [address]  Phone: [phone]  Fax: [phone]

## 2022-07-14 ENCOUNTER — SPECIALTY PHARMACY (OUTPATIENT)
Dept: PHARMACY | Facility: CLINIC | Age: 42
End: 2022-07-14
Payer: COMMERCIAL

## 2022-07-14 NOTE — TELEPHONE ENCOUNTER
Specialty Pharmacy - Refill Coordination    Specialty Medication Orders Linked to Encounter    Flowsheet Row Most Recent Value   Medication #1 DUPIXENT SYRINGE 300 mg/2 mL Syrg (Order#[id number], Rx#[id number])          Refill Questions - Documented Responses    Flowsheet Row Most Recent Value   Patient Availability and HIPAA Verification    Does patient want to proceed with activity? Yes   HIPAA/medical authority confirmed? Yes   Relationship to patient of person spoken to? Self   Refill Screening Questions    Changes to allergies? No   Changes to medications? No   New conditions since last clinic visit? No   Unplanned office visit, urgent care, ED, or hospital admission in the last 4 weeks? No   How does patient/caregiver feel medication is working? Good   Financial problems or insurance changes? No   How many doses of your specialty medications were missed in the last 4 weeks? 0   Would patient like to speak to a pharmacist? No   When does the patient need to receive the medication? 07/21/22   Refill Delivery Questions    How will the patient receive the medication? Delivery Jacklyn   When does the patient need to receive the medication? 07/21/22   Shipping Address Home   Address in [facility] confirmed and updated if neccessary? Yes   Expected Copay ($) 0   Is the patient able to afford the medication copay? Yes   Payment Method zero copay   Days supply of Refill 28   Supplies needed? No supplies needed   Refill activity completed? Yes   Refill activity plan Refill scheduled   Shipment/Pickup Date: 07/18/22          Current Outpatient Medications   Medication Sig    acetaminophen-codeine 300-30mg (TYLENOL #3) 300-30 mg Tab Take 1 tablet by mouth every 4 to 6 hours as needed.    amoxicillin (AMOXIL) 500 MG capsule TAKE 1 CAPSULE BY MOUTH THREE TIMES DAILY UNTIL GONE    DUPIXENT SYRINGE 300 mg/2 mL Syrg Inject 300mg (2ml) into the skin every other week.    EPINEPHrine (EPIPEN) 0.3 mg/0.3 mL AtIn Inject 0.3  mLs (0.3 mg total) into the muscle once. for 1 dose    ibuprofen (ADVIL,MOTRIN) 800 MG tablet Take 800 mg by mouth every 8 (eight) hours as needed.    olmesartan-amLODIPin-hcthiazid 40-10-25 mg Tab Take 1 tablet by mouth once daily.    tiZANidine (ZANAFLEX) 4 MG tablet Take 4 mg by mouth nightly.    triamcinolone acetonide 0.1% (KENALOG) 0.1 % ointment AAA bid prn. Do not use on face, underarms or groin.   Last reviewed on 9/20/2021  1:35 PM by Sepideh Koehler MA    Review of patient's allergies indicates:   Allergen Reactions    Fish containing products Itching, Nausea Only and Swelling    Last reviewed on  9/20/2021 1:33 PM by Sepideh Koehler      Tasks added this encounter   8/11/2022 - Refill Call (Auto Added)   Tasks due within next 3 months   No tasks due.     Sarah Trevizo gallo - Specialty Pharmacy  [address] [facility] [address]  Phone: [phone]  Fax: [phone]

## 2022-08-04 ENCOUNTER — OFFICE VISIT (OUTPATIENT)
Dept: INTERNAL MEDICINE | Facility: CLINIC | Age: 42
End: 2022-08-04
Payer: COMMERCIAL

## 2022-08-04 ENCOUNTER — LAB VISIT (OUTPATIENT)
Dept: LAB | Facility: HOSPITAL | Age: 42
End: 2022-08-04
Attending: FAMILY MEDICINE
Payer: COMMERCIAL

## 2022-08-04 VITALS — SYSTOLIC BLOOD PRESSURE: 145 MMHG | DIASTOLIC BLOOD PRESSURE: 95 MMHG

## 2022-08-04 DIAGNOSIS — Z11.4 ENCOUNTER FOR SCREENING FOR HIV: ICD-10-CM

## 2022-08-04 DIAGNOSIS — E78.00 ELEVATED CHOLESTEROL: ICD-10-CM

## 2022-08-04 DIAGNOSIS — Z11.59 ENCOUNTER FOR HEPATITIS C SCREENING TEST FOR LOW RISK PATIENT: ICD-10-CM

## 2022-08-04 DIAGNOSIS — R73.09 ELEVATED GLUCOSE: Primary | ICD-10-CM

## 2022-08-04 DIAGNOSIS — I10 HYPERTENSION, UNSPECIFIED TYPE: ICD-10-CM

## 2022-08-04 DIAGNOSIS — Z76.89 ESTABLISHING CARE WITH NEW DOCTOR, ENCOUNTER FOR: ICD-10-CM

## 2022-08-04 DIAGNOSIS — R73.09 ELEVATED GLUCOSE: ICD-10-CM

## 2022-08-04 PROCEDURE — 3080F PR MOST RECENT DIASTOLIC BLOOD PRESSURE >= 90 MM HG: ICD-10-PCS | Mod: CPTII,95,, | Performed by: FAMILY MEDICINE

## 2022-08-04 PROCEDURE — 3077F PR MOST RECENT SYSTOLIC BLOOD PRESSURE >= 140 MM HG: ICD-10-PCS | Mod: CPTII,95,, | Performed by: FAMILY MEDICINE

## 2022-08-04 PROCEDURE — 99203 PR OFFICE/OUTPT VISIT, NEW, LEVL III, 30-44 MIN: ICD-10-PCS | Mod: 95,,, | Performed by: FAMILY MEDICINE

## 2022-08-04 PROCEDURE — 80061 LIPID PANEL: CPT | Performed by: FAMILY MEDICINE

## 2022-08-04 PROCEDURE — 36415 COLL VENOUS BLD VENIPUNCTURE: CPT | Performed by: FAMILY MEDICINE

## 2022-08-04 PROCEDURE — 83036 HEMOGLOBIN GLYCOSYLATED A1C: CPT | Performed by: FAMILY MEDICINE

## 2022-08-04 PROCEDURE — 1160F PR REVIEW ALL MEDS BY PRESCRIBER/CLIN PHARMACIST DOCUMENTED: ICD-10-PCS | Mod: CPTII,95,, | Performed by: FAMILY MEDICINE

## 2022-08-04 PROCEDURE — 87389 HIV-1 AG W/HIV-1&-2 AB AG IA: CPT | Performed by: FAMILY MEDICINE

## 2022-08-04 PROCEDURE — 80053 COMPREHEN METABOLIC PANEL: CPT | Performed by: FAMILY MEDICINE

## 2022-08-04 PROCEDURE — 3077F SYST BP >= 140 MM HG: CPT | Mod: CPTII,95,, | Performed by: FAMILY MEDICINE

## 2022-08-04 PROCEDURE — 3080F DIAST BP >= 90 MM HG: CPT | Mod: CPTII,95,, | Performed by: FAMILY MEDICINE

## 2022-08-04 PROCEDURE — 1159F MED LIST DOCD IN RCRD: CPT | Mod: CPTII,95,, | Performed by: FAMILY MEDICINE

## 2022-08-04 PROCEDURE — 1160F RVW MEDS BY RX/DR IN RCRD: CPT | Mod: CPTII,95,, | Performed by: FAMILY MEDICINE

## 2022-08-04 PROCEDURE — 86803 HEPATITIS C AB TEST: CPT | Performed by: FAMILY MEDICINE

## 2022-08-04 PROCEDURE — 1159F PR MEDICATION LIST DOCUMENTED IN MEDICAL RECORD: ICD-10-PCS | Mod: CPTII,95,, | Performed by: FAMILY MEDICINE

## 2022-08-04 PROCEDURE — 99203 OFFICE O/P NEW LOW 30 MIN: CPT | Mod: 95,,, | Performed by: FAMILY MEDICINE

## 2022-08-04 RX ORDER — OLMESARTAN MEDOXOMIL, AMLODIPINE AND HYDROCHLOROTHIAZIDE TABLET 40/10/25 MG 40; 10; 25 MG/1; MG/1; MG/1
1 TABLET ORAL DAILY
Qty: 90 TABLET | Refills: 0 | Status: SHIPPED | OUTPATIENT
Start: 2022-08-04 | End: 2022-11-11

## 2022-08-04 NOTE — PROGRESS NOTES
The patient location is: [place] (home)  The chief complaint leading to consultation is: establish care and blood pressure medication     Visit type: audiovisual    Face to Face time with patient: 21 minutes  21 minutes of total time spent on the encounter, which includes face to face time and non-face to face time preparing to see the patient (eg, review of tests), Obtaining and/or reviewing separately obtained history, Documenting clinical information in the electronic or other health record, Independently interpreting results (not separately reported) and communicating results to the patient/family/caregiver, or Care coordination (not separately reported).         Each patient to whom he or she provides medical services by telemedicine is:  (1) informed of the relationship between the physician and patient and the respective role of any other health care provider with respect to management of the patient; and (2) notified that he or she may decline to receive medical services by telemedicine and may withdraw from such care at any time.    Krish Daniellechandu  42 y.o. Black or  male    Here for follow up on hypertension and to establish care.     Answers for HPI/ROS submitted by the patient on 7/28/2022  Chronicity: chronic  Onset: more than 1 year ago  Progression since onset: unchanged  Condition status: controlled  anxiety: No  blurred vision: No  chest pain: No  headaches: No  malaise/fatigue: No  neck pain: No  orthopnea: No  palpitations: No  peripheral edema: No  PND: No  shortness of breath: No  sweats: No  Agents associated with hypertension: no associated agents  CAD risks: diabetes mellitus, dyslipidemia, obesity  Compliance problems: no compliance problems  Past treatments: diuretics  Improvement on treatment: moderate    7 years or so he was diagnosed with HTN  HLD has been controlled with diet and exercise but he says he has gotten lazy    Exercising     He also needs a PCP   a1c elevated  recently he was told it was 7    Reports taking medications as instructed.  Not taking any OTC meds.    Past Medical History:   Diagnosis Date    Eczema     HLD (hyperlipidemia)     Primary hypertension          Current Outpatient Medications:     acetaminophen-codeine 300-30mg (TYLENOL #3) 300-30 mg Tab, Take 1 tablet by mouth every 4 to 6 hours as needed., Disp: , Rfl:     amoxicillin (AMOXIL) 500 MG capsule, TAKE 1 CAPSULE BY MOUTH THREE TIMES DAILY UNTIL GONE, Disp: , Rfl:     DUPIXENT SYRINGE 300 mg/2 mL Syrg, Inject 300mg (2ml) into the skin every other week., Disp: 4 mL, Rfl: 5    EPINEPHrine (EPIPEN) 0.3 mg/0.3 mL AtIn, Inject 0.3 mLs (0.3 mg total) into the muscle once. for 1 dose, Disp: 2 Device, Rfl: 3    olmesartan-amLODIPin-hcthiazid 40-10-25 mg Tab, Take 1 tablet by mouth once daily., Disp: 90 tablet, Rfl: 0    tiZANidine (ZANAFLEX) 4 MG tablet, Take 4 mg by mouth nightly., Disp: , Rfl:     triamcinolone acetonide 0.1% (KENALOG) 0.1 % ointment, AAA bid prn. Do not use on face, underarms or groin., Disp: 80 g, Rfl: 3    Review of patient's allergies indicates:   Allergen Reactions    Fish containing products Itching, Nausea Only and Swelling       ROS: Denies dizziness, headache, chest pain, shortness of breath or edema    PE:  GEN: Alert and oriented, no acute distress  LUNGS: Respirations unlabored    ASSESSMENT/PLAN:  Elevated glucose  -     HEMOGLOBIN A1C; Future; Expected date: 08/04/2022  -     COMPREHENSIVE METABOLIC PANEL; Future; Expected date: 08/04/2022    Elevated cholesterol  -     Lipid Panel; Future; Expected date: 08/04/2022    Encounter for screening for HIV  -     HIV 1 / 2 ANTIBODY; Future; Expected date: 08/04/2022    Encounter for hepatitis C screening test for low risk patient  -     Hepatitis C Antibody; Future; Expected date: 08/04/2022    Hypertension, unspecified type  -     olmesartan-amLODIPin-hcthiazid 40-10-25 mg Tab; Take 1 tablet by mouth once daily.  Dispense:  90 tablet; Refill: 0    Establishing care with new doctor, encounter for    reviewed medical social, surgical and family history      Diet and exercise discussed  I would like to add another medication for blood pressure however pt would like to get back where he was with physical activity and diet     Follow up 2 weeks nurse visit

## 2022-08-05 LAB
ALBUMIN SERPL BCP-MCNC: 4.2 G/DL (ref 3.5–5.2)
ALP SERPL-CCNC: 70 U/L (ref 55–135)
ALT SERPL W/O P-5'-P-CCNC: 29 U/L (ref 10–44)
ANION GAP SERPL CALC-SCNC: 15 MMOL/L (ref 8–16)
AST SERPL-CCNC: 27 U/L (ref 10–40)
BILIRUB SERPL-MCNC: 1.5 MG/DL (ref 0.1–1)
BUN SERPL-MCNC: 8 MG/DL (ref 6–20)
CALCIUM SERPL-MCNC: 10.9 MG/DL (ref 8.7–10.5)
CHLORIDE SERPL-SCNC: 98 MMOL/L (ref 95–110)
CHOLEST SERPL-MCNC: 285 MG/DL (ref 120–199)
CHOLEST/HDLC SERPL: 7.3 {RATIO} (ref 2–5)
CO2 SERPL-SCNC: 23 MMOL/L (ref 23–29)
CREAT SERPL-MCNC: 0.9 MG/DL (ref 0.5–1.4)
EST. GFR  (NO RACE VARIABLE): >60 ML/MIN/1.73 M^2
ESTIMATED AVG GLUCOSE: 143 MG/DL (ref 68–131)
GLUCOSE SERPL-MCNC: 66 MG/DL (ref 70–110)
HBA1C MFR BLD: 6.6 % (ref 4–5.6)
HDLC SERPL-MCNC: 39 MG/DL (ref 40–75)
HDLC SERPL: 13.7 % (ref 20–50)
LDLC SERPL CALC-MCNC: 219.6 MG/DL (ref 63–159)
NONHDLC SERPL-MCNC: 246 MG/DL
POTASSIUM SERPL-SCNC: 3.8 MMOL/L (ref 3.5–5.1)
PROT SERPL-MCNC: 8.7 G/DL (ref 6–8.4)
SODIUM SERPL-SCNC: 136 MMOL/L (ref 136–145)
TRIGL SERPL-MCNC: 132 MG/DL (ref 30–150)

## 2022-08-08 LAB
HCV AB SERPL QL IA: NEGATIVE
HIV 1+2 AB+HIV1 P24 AG SERPL QL IA: NEGATIVE

## 2022-08-11 ENCOUNTER — PATIENT MESSAGE (OUTPATIENT)
Dept: PHARMACY | Facility: CLINIC | Age: 42
End: 2022-08-11
Payer: COMMERCIAL

## 2022-08-11 ENCOUNTER — OFFICE VISIT (OUTPATIENT)
Dept: INTERNAL MEDICINE | Facility: CLINIC | Age: 42
End: 2022-08-11
Payer: COMMERCIAL

## 2022-08-11 ENCOUNTER — SPECIALTY PHARMACY (OUTPATIENT)
Dept: PHARMACY | Facility: CLINIC | Age: 42
End: 2022-08-11
Payer: COMMERCIAL

## 2022-08-11 DIAGNOSIS — Z79.899 ENCOUNTER FOR LONG-TERM (CURRENT) USE OF MEDICATIONS: ICD-10-CM

## 2022-08-11 DIAGNOSIS — L20.89 OTHER ATOPIC DERMATITIS: ICD-10-CM

## 2022-08-11 DIAGNOSIS — E78.01 FAMILIAL HYPERCHOLESTEROLEMIA: ICD-10-CM

## 2022-08-11 DIAGNOSIS — G47.30 SLEEP APNEA, UNSPECIFIED TYPE: ICD-10-CM

## 2022-08-11 DIAGNOSIS — E11.9 TYPE 2 DIABETES, DIET CONTROLLED: Primary | ICD-10-CM

## 2022-08-11 PROCEDURE — 99213 PR OFFICE/OUTPT VISIT, EST, LEVL III, 20-29 MIN: ICD-10-PCS | Mod: 95,,, | Performed by: FAMILY MEDICINE

## 2022-08-11 PROCEDURE — 99213 OFFICE O/P EST LOW 20 MIN: CPT | Mod: 95,,, | Performed by: FAMILY MEDICINE

## 2022-08-11 PROCEDURE — 3044F PR MOST RECENT HEMOGLOBIN A1C LEVEL <7.0%: ICD-10-PCS | Mod: CPTII,95,, | Performed by: FAMILY MEDICINE

## 2022-08-11 PROCEDURE — 3044F HG A1C LEVEL LT 7.0%: CPT | Mod: CPTII,95,, | Performed by: FAMILY MEDICINE

## 2022-08-11 RX ORDER — DUPILUMAB 300 MG/2ML
INJECTION, SOLUTION SUBCUTANEOUS
Qty: 4 ML | Refills: 5 | OUTPATIENT
Start: 2022-08-11

## 2022-08-11 RX ORDER — LANCETS
1 EACH MISCELLANEOUS 2 TIMES DAILY
Qty: 100 EACH | Refills: 3 | Status: SHIPPED | OUTPATIENT
Start: 2022-08-11

## 2022-08-11 NOTE — TELEPHONE ENCOUNTER
Incoming call from patient regarding Dupixent refill. Patient states injection is due 8/18. Refill request sent to [facility].

## 2022-08-11 NOTE — PROGRESS NOTES
The patient location is: [place] ([place])  The chief complaint leading to consultation is: fu labs     Visit type: audiovisual    Face to Face time with patient: 15 minutes  15 minutes of total time spent on the encounter, which includes face to face time and non-face to face time preparing to see the patient (eg, review of tests), Obtaining and/or reviewing separately obtained history, Documenting clinical information in the electronic or other health record, Independently interpreting results (not separately reported) and communicating results to the patient/family/caregiver, or Care coordination (not separately reported).         Each patient to whom he or she provides medical services by telemedicine is:  (1) informed of the relationship between the physician and patient and the respective role of any other health care provider with respect to management of the patient; and (2) notified that he or she may decline to receive medical services by telemedicine and may withdraw from such care at any time.  Subjective:       Patient ID: Krish Ramos is a 42 y.o. male.    Chief Complaint: No chief complaint on file.    HPI Mr. Ramos presents today via telemedicine for lab follow up    Never on medication for diabetes or hyperlipidemia    He would like to not take a statin  He does exercise 3 times a week with 10-15 minutes of cardio    Would like to continue to make diet changes     A1c 7.2 went to 6.6     Review of Systems   Constitutional: Negative for fatigue.   Cardiovascular: Negative for chest pain.   Endocrine: Negative for polydipsia, polyphagia and polyuria.   Skin: Negative for pallor.   Neurological: Negative for dizziness, tremors, seizures, speech difficulty, weakness and headaches.   Psychiatric/Behavioral: Negative for confusion. The patient is not nervous/anxious.            Past Medical History:   Diagnosis Date    Eczema     HLD (hyperlipidemia)     Primary hypertension      Past Surgical  History:   Procedure Laterality Date    FEMUR FRACTURE SURGERY Right      No family history on file.  Social History     Socioeconomic History    Marital status: Single   Tobacco Use    Smoking status: Never Smoker    Smokeless tobacco: Never Used       Objective:        Physical Exam  Constitutional:       Appearance: He is obese.   Neurological:      Mental Status: He is alert and oriented to person, place, and time.           Results for orders placed or performed in visit on 08/04/22   HEMOGLOBIN A1C   Result Value Ref Range    Hemoglobin A1C 6.6 (H) 4.0 - 5.6 %    Estimated Avg Glucose 143 (H) 68 - 131 mg/dL   Lipid Panel   Result Value Ref Range    Cholesterol 285 (H) 120 - 199 mg/dL    Triglycerides 132 30 - 150 mg/dL    HDL 39 (L) 40 - 75 mg/dL    LDL Cholesterol 219.6 (H) 63.0 - 159.0 mg/dL    HDL/Cholesterol Ratio 13.7 (L) 20.0 - 50.0 %    Total Cholesterol/HDL Ratio 7.3 (H) 2.0 - 5.0    Non-HDL Cholesterol 246 mg/dL   COMPREHENSIVE METABOLIC PANEL   Result Value Ref Range    Sodium 136 136 - 145 mmol/L    Potassium 3.8 3.5 - 5.1 mmol/L    Chloride 98 95 - 110 mmol/L    CO2 23 23 - 29 mmol/L    Glucose 66 (L) 70 - 110 mg/dL    BUN 8 6 - 20 mg/dL    Creatinine 0.9 0.5 - 1.4 mg/dL    Calcium 10.9 (H) 8.7 - 10.5 mg/dL    Total Protein 8.7 (H) 6.0 - 8.4 g/dL    Albumin 4.2 3.5 - 5.2 g/dL    Total Bilirubin 1.5 (H) 0.1 - 1.0 mg/dL    Alkaline Phosphatase 70 55 - 135 U/L    AST 27 10 - 40 U/L    ALT 29 10 - 44 U/L    Anion Gap 15 8 - 16 mmol/L    eGFR >60.0 >60 mL/min/1.73 m^2   Hepatitis C Antibody   Result Value Ref Range    Hepatitis C Ab Negative Negative   HIV 1 / 2 ANTIBODY   Result Value Ref Range    HIV 1/2 Ag/Ab Negative Negative       Assessment/Plan:     Type 2 diabetes, diet controlled  -     blood sugar diagnostic Strp; 1 each by Misc.(Non-Drug; Combo Route) route 2 (two) times a day.  Dispense: 100 each; Refill: 3  -     lancets (ACCU-CHEK SOFTCLIX LANCETS) Misc; 1 each by Misc.(Non-Drug; Combo  Route) route 2 (two) times a day.  Dispense: 100 each; Refill: 3  -     HEMOGLOBIN A1C; Future; Expected date: 08/11/2022    Familial hypercholesterolemia  -     Lipid Panel; Future; Expected date: 08/11/2022  -     HEMOGLOBIN A1C; Future; Expected date: 08/11/2022    Sleep apnea, unspecified type  -     Ambulatory referral/consult to Pulmonology; Future; Expected date: 08/18/2022      Pt does not want to take a statin a tthis time and would like to continue diet and exercise for HLD and diabetes    Follow up 3 months     Caro Pham MD  ON   Family MedicineNotes:

## 2022-08-15 NOTE — TELEPHONE ENCOUNTER
Outgoing call regarding Dupixent refill. Informed patient that request was denied. Pt has to make an appoinment. Patient verbalized understanding and stated he would give the doctor a call.

## 2022-08-18 ENCOUNTER — CLINICAL SUPPORT (OUTPATIENT)
Dept: INTERNAL MEDICINE | Facility: CLINIC | Age: 42
End: 2022-08-18
Payer: COMMERCIAL

## 2022-08-18 VITALS — SYSTOLIC BLOOD PRESSURE: 118 MMHG | HEART RATE: 79 BPM | DIASTOLIC BLOOD PRESSURE: 80 MMHG

## 2022-08-18 DIAGNOSIS — I10 HYPERTENSION, UNSPECIFIED TYPE: Primary | ICD-10-CM

## 2022-08-18 PROCEDURE — 99999 PR PBB SHADOW E&M-EST. PATIENT-LVL II: ICD-10-PCS | Mod: PBBFAC,,,

## 2022-08-18 PROCEDURE — 99999 PR PBB SHADOW E&M-EST. PATIENT-LVL II: CPT | Mod: PBBFAC,,,

## 2022-08-18 NOTE — PROGRESS NOTES
Pt here for BP check, no acute signs/symptoms of distress. Pt states he has felt okay.    /80 and HR 79. Pt advised to continue medications as prescribed and follow up as scheduled. He verbalized understanding.

## 2022-08-18 NOTE — TELEPHONE ENCOUNTER
No new care gaps identified.  [facility] Embedded Care Gaps. Reference number: [id number]. 8/18/2022   9:23:38 AM MARYT

## 2022-08-19 ENCOUNTER — PATIENT MESSAGE (OUTPATIENT)
Dept: INTERNAL MEDICINE | Facility: CLINIC | Age: 42
End: 2022-08-19
Payer: COMMERCIAL

## 2022-08-19 DIAGNOSIS — Z79.899 ENCOUNTER FOR LONG-TERM (CURRENT) USE OF MEDICATIONS: ICD-10-CM

## 2022-08-19 DIAGNOSIS — L20.89 OTHER ATOPIC DERMATITIS: ICD-10-CM

## 2022-08-23 ENCOUNTER — PATIENT MESSAGE (OUTPATIENT)
Dept: DERMATOLOGY | Facility: CLINIC | Age: 42
End: 2022-08-23
Payer: COMMERCIAL

## 2022-08-23 ENCOUNTER — PATIENT MESSAGE (OUTPATIENT)
Dept: INTERNAL MEDICINE | Facility: CLINIC | Age: 42
End: 2022-08-23
Payer: COMMERCIAL

## 2022-08-23 RX ORDER — DUPILUMAB 300 MG/2ML
INJECTION, SOLUTION SUBCUTANEOUS
Qty: 4 ML | Refills: 5 | OUTPATIENT
Start: 2022-08-23

## 2022-08-23 NOTE — TELEPHONE ENCOUNTER
Called pt to see if he was able to make an appt with provider so we are aware when to follow up. Pt stated he messaged provider. Pt plans to see provider on 8/29. Will follow up on 8/29.

## 2022-08-29 ENCOUNTER — OFFICE VISIT (OUTPATIENT)
Dept: DERMATOLOGY | Facility: CLINIC | Age: 42
End: 2022-08-29
Payer: COMMERCIAL

## 2022-08-29 DIAGNOSIS — L20.89 OTHER ATOPIC DERMATITIS: Primary | ICD-10-CM

## 2022-08-29 DIAGNOSIS — Z79.899 ENCOUNTER FOR LONG-TERM (CURRENT) USE OF MEDICATIONS: ICD-10-CM

## 2022-08-29 PROCEDURE — 1160F PR REVIEW ALL MEDS BY PRESCRIBER/CLIN PHARMACIST DOCUMENTED: ICD-10-PCS | Mod: CPTII,95,, | Performed by: DERMATOLOGY

## 2022-08-29 PROCEDURE — 1159F MED LIST DOCD IN RCRD: CPT | Mod: CPTII,95,, | Performed by: DERMATOLOGY

## 2022-08-29 PROCEDURE — 3044F HG A1C LEVEL LT 7.0%: CPT | Mod: CPTII,95,, | Performed by: DERMATOLOGY

## 2022-08-29 PROCEDURE — 99214 PR OFFICE/OUTPT VISIT, EST, LEVL IV, 30-39 MIN: ICD-10-PCS | Mod: 95,,, | Performed by: DERMATOLOGY

## 2022-08-29 PROCEDURE — 99214 OFFICE O/P EST MOD 30 MIN: CPT | Mod: 95,,, | Performed by: DERMATOLOGY

## 2022-08-29 PROCEDURE — 3044F PR MOST RECENT HEMOGLOBIN A1C LEVEL <7.0%: ICD-10-PCS | Mod: CPTII,95,, | Performed by: DERMATOLOGY

## 2022-08-29 PROCEDURE — 1159F PR MEDICATION LIST DOCUMENTED IN MEDICAL RECORD: ICD-10-PCS | Mod: CPTII,95,, | Performed by: DERMATOLOGY

## 2022-08-29 PROCEDURE — 1160F RVW MEDS BY RX/DR IN RCRD: CPT | Mod: CPTII,95,, | Performed by: DERMATOLOGY

## 2022-08-29 RX ORDER — DUPILUMAB 300 MG/2ML
INJECTION, SOLUTION SUBCUTANEOUS
Qty: 4 ML | Refills: 11 | Status: SHIPPED | OUTPATIENT
Start: 2022-08-29 | End: 2023-02-21 | Stop reason: SDUPTHER

## 2022-08-29 RX ORDER — TACROLIMUS 1 MG/G
OINTMENT TOPICAL 2 TIMES DAILY
Qty: 60 G | Refills: 3 | Status: SHIPPED | OUTPATIENT
Start: 2022-08-29 | End: 2023-04-21 | Stop reason: SDUPTHER

## 2022-08-29 NOTE — PROGRESS NOTES
Subjective:       Patient ID:  Krish Ramos is a 42 y.o. male who presents for No chief complaint on file.    The patient location is: home  The chief complaint leading to consultation is: dupixent refill    Visit type: audiovisual    Face to Face time with patient: 25 min  30 minutes of total time spent on the encounter, which includes face to face time and non-face to face time preparing to see the patient (eg, review of tests), Obtaining and/or reviewing separately obtained history, Documenting clinical information in the electronic or other health record, Independently interpreting results (not separately reported) and communicating results to the patient/family/caregiver, or Care coordination (not separately reported).         Each patient to whom he or she provides medical services by telemedicine is:  (1) informed of the relationship between the physician and patient and the respective role of any other health care provider with respect to management of the patient; and (2) notified that he or she may decline to receive medical services by telemedicine and may withdraw from such care at any time.    Notes:     Hx of severe atopic dermatitis and allergies to fish, walnuts and stone fruit, last seen on 9/10/21.  On dupixent intermittent since 2019.  + improvement in skin.  Has few residual areas.     Prior treatments: dupixent since 2019, betamethasone, NBUVB, triamcinonlone, epiceram    Denies hx of cold sores, conjunctivitis, painful vision or sores in or near the eye.           Review of Systems   Constitutional:  Negative for fever and chills.   HENT:  Negative for mouth sores.    Eyes:  Negative for itching, eye watering, eye irritation and eyelid inflammation.   Respiratory:  Negative for shortness of breath.    Gastrointestinal:  Negative for nausea, vomiting and diarrhea.   Genitourinary:  Negative for genital sores.   Skin:  Positive for itching and rash.   Allergic/Immunologic: Positive for  environmental allergies.                    Objective:    Physical Exam   Constitutional: He appears well-developed and well-nourished. No distress.   Neurological: He is alert and oriented to person, place, and time. He is not disoriented.   Psychiatric: He has a normal mood and affect.   Skin:   Areas Examined (abnormalities noted in diagram):   Head / Face Inspection Performed  Neck Inspection Performed  RUE Inspected  LUE Inspection Performed  Nails and Digits Inspection Performed                          Assessment / Plan:        Other atopic dermatitis  Encounter for long-term (current) use of medications  -     CBC Auto Differential; Future; Expected date: 08/29/2022  -     Comprehensive Metabolic Panel; Future; Expected date: 08/29/2022  -     DUPIXENT SYRINGE 300 mg/2 mL Syrg; Inject 300mg (2ml) into the skin every other week.  Dispense: 4 mL; Refill: 11  -     continue dupixent. Will check labs today and add tacrolimus ointment bid prn.  EASI score improved to 5 (from baseline of 25).    Reviewed side effects of immunosuppression, conjunctivitis/keratitis and reactivation of the herpes virus. The patient acknowledged understanding and wishes to proceed with Dupixent therapy.              Follow up in about 1 year (around 8/29/2023).

## 2022-08-30 ENCOUNTER — PATIENT MESSAGE (OUTPATIENT)
Dept: PHARMACY | Facility: CLINIC | Age: 42
End: 2022-08-30
Payer: COMMERCIAL

## 2022-08-30 NOTE — TELEPHONE ENCOUNTER
Dupixent refill approval received. LVM and sent a my chart message attempting to set up the refill.

## 2022-08-30 NOTE — TELEPHONE ENCOUNTER
Specialty Pharmacy - Refill Coordination    Specialty Medication Orders Linked to Encounter      Flowsheet Row Most Recent Value   Medication #1 DUPIXENT SYRINGE 300 mg/2 mL Syrg (Order#[id number], Rx#[id number])          Informed pt to start a new injection schedule based on injection date, approval by [facility].     Refill Questions - Documented Responses      Flowsheet Row Most Recent Value   Patient Availability and HIPAA Verification    Does patient want to proceed with activity? Yes   HIPAA/medical authority confirmed? Yes   Relationship to patient of person spoken to? Self   Refill Screening Questions    Changes to allergies? No   Changes to medications? No   New conditions since last clinic visit? No   Unplanned office visit, urgent care, ED, or hospital admission in the last 4 weeks? No   How does patient/caregiver feel medication is working? Good   Financial problems or insurance changes? No   How many doses of your specialty medications were missed in the last 4 weeks? 0   Would patient like to speak to a pharmacist? No   When does the patient need to receive the medication? 09/01/22   Refill Delivery Questions    How will the patient receive the medication? Delivery [facility]   When does the patient need to receive the medication? 09/01/22   Shipping Address Home   Address in [facility] confirmed and updated if neccessary? Yes   Expected Copay ($) 0   Is the patient able to afford the medication copay? Yes   Payment Method zero copay   Days supply of Refill 28   Supplies needed? No supplies needed   Refill activity completed? Yes   Refill activity plan Refill scheduled   Shipment/Pickup Date: 08/31/22            Current Outpatient Medications   Medication Sig    acetaminophen-codeine 300-30mg (TYLENOL #3) 300-30 mg Tab Take 1 tablet by mouth every 4 to 6 hours as needed.    amoxicillin (AMOXIL) 500 MG capsule TAKE 1 CAPSULE BY MOUTH THREE TIMES DAILY UNTIL GONE    blood sugar diagnostic Strp 1 each  by Misc.(Non-Drug; Combo Route) route 2 (two) times a day.    blood sugar diagnostic Strp Use with glucometer daily as needed. Please send accu-chek dawit strips.    DUPIXENT SYRINGE 300 mg/2 mL Syrg Inject 300mg (2ml) into the skin every other week.    EPINEPHrine (EPIPEN) 0.3 mg/0.3 mL AtIn Inject 0.3 mLs (0.3 mg total) into the muscle once. for 1 dose    lancets (ACCU-CHEK SOFTCLIX LANCETS) Misc 1 each by Misc.(Non-Drug; Combo Route) route 2 (two) times a day.    olmesartan-amLODIPin-hcthiazid 40-10-25 mg Tab Take 1 tablet by mouth once daily.    tacrolimus (PROTOPIC) 0.1 % ointment Apply topically 2 (two) times daily. Non-steroid. Safe to use long-term    tiZANidine (ZANAFLEX) 4 MG tablet Take 4 mg by mouth nightly.    triamcinolone acetonide 0.1% (KENALOG) 0.1 % ointment AAA bid prn. Do not use on face, underarms or groin.   Last reviewed on 8/29/2022 12:02 PM by Sonja Rapp MD    Review of patient's allergies indicates:   Allergen Reactions    Fish containing products Itching, Nausea Only and Swelling    Last reviewed on  8/29/2022 12:02 PM by Sonja Rapp      Tasks added this encounter   9/22/2022 - Refill Call (Auto Added)   Tasks due within next 3 months   No tasks due.     Chelle Brand, Patient Care Assistant  [facility]  [address] [place], [place] [address]  [facility] [address]  Phone: [phone]  Fax: [phone]

## 2022-09-06 ENCOUNTER — LAB VISIT (OUTPATIENT)
Dept: LAB | Facility: HOSPITAL | Age: 42
End: 2022-09-06
Attending: DERMATOLOGY
Payer: COMMERCIAL

## 2022-09-06 DIAGNOSIS — L20.89 OTHER ATOPIC DERMATITIS: ICD-10-CM

## 2022-09-06 DIAGNOSIS — Z79.899 ENCOUNTER FOR LONG-TERM (CURRENT) USE OF MEDICATIONS: ICD-10-CM

## 2022-09-06 PROCEDURE — 80053 COMPREHEN METABOLIC PANEL: CPT | Performed by: DERMATOLOGY

## 2022-09-06 PROCEDURE — 36415 COLL VENOUS BLD VENIPUNCTURE: CPT | Performed by: DERMATOLOGY

## 2022-09-06 PROCEDURE — 85025 COMPLETE CBC W/AUTO DIFF WBC: CPT | Performed by: DERMATOLOGY

## 2022-09-07 LAB
ALBUMIN SERPL BCP-MCNC: 4 G/DL (ref 3.5–5.2)
ALP SERPL-CCNC: 72 U/L (ref 55–135)
ALT SERPL W/O P-5'-P-CCNC: 23 U/L (ref 10–44)
ANION GAP SERPL CALC-SCNC: 11 MMOL/L (ref 8–16)
AST SERPL-CCNC: 23 U/L (ref 10–40)
BASOPHILS # BLD AUTO: 0.1 K/UL (ref 0–0.2)
BASOPHILS NFR BLD: 1.6 % (ref 0–1.9)
BILIRUB SERPL-MCNC: 0.5 MG/DL (ref 0.1–1)
BUN SERPL-MCNC: 8 MG/DL (ref 6–20)
CALCIUM SERPL-MCNC: 10.7 MG/DL (ref 8.7–10.5)
CHLORIDE SERPL-SCNC: 104 MMOL/L (ref 95–110)
CO2 SERPL-SCNC: 24 MMOL/L (ref 23–29)
CREAT SERPL-MCNC: 0.8 MG/DL (ref 0.5–1.4)
DIFFERENTIAL METHOD: NORMAL
EOSINOPHIL # BLD AUTO: 0.4 K/UL (ref 0–0.5)
EOSINOPHIL NFR BLD: 6.1 % (ref 0–8)
ERYTHROCYTE [DISTWIDTH] IN BLOOD BY AUTOMATED COUNT: 13.8 % (ref 11.5–14.5)
EST. GFR  (NO RACE VARIABLE): >60 ML/MIN/1.73 M^2
GLUCOSE SERPL-MCNC: 90 MG/DL (ref 70–110)
HCT VFR BLD AUTO: 42.5 % (ref 40–54)
HGB BLD-MCNC: 14 G/DL (ref 14–18)
IMM GRANULOCYTES # BLD AUTO: 0.01 K/UL (ref 0–0.04)
IMM GRANULOCYTES NFR BLD AUTO: 0.2 % (ref 0–0.5)
LYMPHOCYTES # BLD AUTO: 2.4 K/UL (ref 1–4.8)
LYMPHOCYTES NFR BLD: 36.8 % (ref 18–48)
MCH RBC QN AUTO: 30.2 PG (ref 27–31)
MCHC RBC AUTO-ENTMCNC: 32.9 G/DL (ref 32–36)
MCV RBC AUTO: 92 FL (ref 82–98)
MONOCYTES # BLD AUTO: 0.6 K/UL (ref 0.3–1)
MONOCYTES NFR BLD: 8.6 % (ref 4–15)
NEUTROPHILS # BLD AUTO: 3 K/UL (ref 1.8–7.7)
NEUTROPHILS NFR BLD: 46.7 % (ref 38–73)
NRBC BLD-RTO: 0 /100 WBC
PLATELET # BLD AUTO: 413 K/UL (ref 150–450)
PMV BLD AUTO: 10.1 FL (ref 9.2–12.9)
POTASSIUM SERPL-SCNC: 3.9 MMOL/L (ref 3.5–5.1)
PROT SERPL-MCNC: 8.3 G/DL (ref 6–8.4)
RBC # BLD AUTO: 4.64 M/UL (ref 4.6–6.2)
SODIUM SERPL-SCNC: 139 MMOL/L (ref 136–145)
WBC # BLD AUTO: 6.38 K/UL (ref 3.9–12.7)

## 2022-09-13 DIAGNOSIS — L20.89 OTHER ATOPIC DERMATITIS: ICD-10-CM

## 2022-09-13 DIAGNOSIS — Z79.899 ENCOUNTER FOR LONG-TERM (CURRENT) USE OF MEDICATIONS: ICD-10-CM

## 2022-09-15 RX ORDER — DUPILUMAB 300 MG/2ML
INJECTION, SOLUTION SUBCUTANEOUS
Qty: 4 ML | Refills: 11 | OUTPATIENT
Start: 2022-09-15

## 2022-09-22 ENCOUNTER — SPECIALTY PHARMACY (OUTPATIENT)
Dept: PHARMACY | Facility: CLINIC | Age: 42
End: 2022-09-22
Payer: COMMERCIAL

## 2022-09-22 NOTE — TELEPHONE ENCOUNTER
Outgoing call regarding dupixent refill; PA required per insurance; informed pt that once it gets approved [facility] will follow up to schedule delivery; routed to [facility]

## 2022-10-18 ENCOUNTER — SPECIALTY PHARMACY (OUTPATIENT)
Dept: PHARMACY | Facility: CLINIC | Age: 42
End: 2022-10-18
Payer: COMMERCIAL

## 2022-10-18 NOTE — TELEPHONE ENCOUNTER
Specialty Pharmacy - Refill Coordination    Specialty Medication Orders Linked to Encounter      Flowsheet Row Most Recent Value   Medication #1 DUPIXENT SYRINGE 300 mg/2 mL Syrg (Order#[id number], Rx#[id number])            Refill Questions - Documented Responses      Flowsheet Row Most Recent Value   Patient Availability and HIPAA Verification    Does patient want to proceed with activity? Yes   HIPAA/medical authority confirmed? Yes   Relationship to patient of person spoken to? Self   Refill Screening Questions    Changes to allergies? No   Changes to medications? No   New conditions since last clinic visit? No   Unplanned office visit, urgent care, ED, or hospital admission in the last 4 weeks? No   How does patient/caregiver feel medication is working? Good   Financial problems or insurance changes? No   How many doses of your specialty medications were missed in the last 4 weeks? 0   Would patient like to speak to a pharmacist? No   When does the patient need to receive the medication? 10/26/22   Refill Delivery Questions    How will the patient receive the medication? [facility]   When does the patient need to receive the medication? 10/26/22   Shipping Address Home   Address in [facility] confirmed and updated if neccessary? Yes   Expected Copay ($) 0   Is the patient able to afford the medication copay? Yes   Payment Method zero copay   Days supply of Refill 28   Supplies needed? No supplies needed   Refill activity completed? Yes   Refill activity plan Refill scheduled   Shipment/Pickup Date: 10/20/22            Current Outpatient Medications   Medication Sig    acetaminophen-codeine 300-30mg (TYLENOL #3) 300-30 mg Tab Take 1 tablet by mouth every 4 to 6 hours as needed.    amoxicillin (AMOXIL) 500 MG capsule TAKE 1 CAPSULE BY MOUTH THREE TIMES DAILY UNTIL GONE    blood sugar diagnostic Strp 1 each by Misc.(Non-Drug; Combo Route) route 2 (two) times a day.    blood sugar diagnostic Strp Use with  glucometer daily as needed. Please send accu-chek dawit strips.    DUPIXENT SYRINGE 300 mg/2 mL Syrg Inject 300mg (1 syringe) into the skin every other week.    EPINEPHrine (EPIPEN) 0.3 mg/0.3 mL AtIn Inject 0.3 mLs (0.3 mg total) into the muscle once. for 1 dose    lancets (ACCU-CHEK SOFTCLIX LANCETS) Misc 1 each by Misc.(Non-Drug; Combo Route) route 2 (two) times a day.    olmesartan-amLODIPin-hcthiazid 40-10-25 mg Tab Take 1 tablet by mouth once daily.    tacrolimus (PROTOPIC) 0.1 % ointment Apply topically 2 (two) times daily. Non-steroid. Safe to use long-term    tiZANidine (ZANAFLEX) 4 MG tablet Take 4 mg by mouth nightly.    triamcinolone acetonide 0.1% (KENALOG) 0.1 % ointment AAA bid prn. Do not use on face, underarms or groin.   Last reviewed on 8/29/2022 12:02 PM by Sonja Rapp MD    Review of patient's allergies indicates:   Allergen Reactions    Fish containing products Itching, Nausea Only and Swelling    Last reviewed on  8/29/2022 12:02 PM by Sonja Rapp      Tasks added this encounter   11/16/2022 - Refill Call (Auto Added)   Tasks due within next 3 months   No tasks due.     [facility]  [address] [place] [id number]  Phone: [phone]  Fax: [phone]

## 2022-11-16 ENCOUNTER — SPECIALTY PHARMACY (OUTPATIENT)
Dept: PHARMACY | Facility: CLINIC | Age: 42
End: 2022-11-16
Payer: COMMERCIAL

## 2022-11-16 NOTE — TELEPHONE ENCOUNTER
Specialty Pharmacy - Refill Coordination    Specialty Medication Orders Linked to Encounter      Flowsheet Row Most Recent Value   Medication #1 DUPIXENT SYRINGE 300 mg/2 mL Syrg (Order#[id number], Rx#[id number])            Refill Questions - Documented Responses      Flowsheet Row Most Recent Value   Patient Availability and HIPAA Verification    Does patient want to proceed with activity? Yes   HIPAA/medical authority confirmed? Yes   Relationship to patient of person spoken to? Self   Refill Screening Questions    Changes to allergies? No   Changes to medications? No   New conditions since last clinic visit? No   Unplanned office visit, urgent care, ED, or hospital admission in the last 4 weeks? No   How does patient/caregiver feel medication is working? Good   Financial problems or insurance changes? No   How many doses of your specialty medications were missed in the last 4 weeks? 0   Would patient like to speak to a pharmacist? No   When does the patient need to receive the medication? 11/23/22   Refill Delivery Questions    How will the patient receive the medication? [facility]   When does the patient need to receive the medication? 11/23/22   Shipping Address Home   Address in [facility] confirmed and updated if neccessary? Yes   Expected Copay ($) 0   Is the patient able to afford the medication copay? Yes   Payment Method zero copay   Days supply of Refill 28   Supplies needed? No supplies needed   Refill activity completed? Yes   Refill activity plan Refill scheduled   Shipment/Pickup Date: 11/18/22            Current Outpatient Medications   Medication Sig    acetaminophen-codeine 300-30mg (TYLENOL #3) 300-30 mg Tab Take 1 tablet by mouth every 4 to 6 hours as needed.    amoxicillin (AMOXIL) 500 MG capsule TAKE 1 CAPSULE BY MOUTH THREE TIMES DAILY UNTIL GONE    blood sugar diagnostic Strp 1 each by Misc.(Non-Drug; Combo Route) route 2 (two) times a day.    blood sugar diagnostic Strp Use with  glucometer daily as needed. Please send accu-chek dawit strips.    DUPIXENT SYRINGE 300 mg/2 mL Syrg Inject 300mg (1 syringe) into the skin every other week.    EPINEPHrine (EPIPEN) 0.3 mg/0.3 mL AtIn Inject 0.3 mLs (0.3 mg total) into the muscle once. for 1 dose    lancets (ACCU-CHEK SOFTCLIX LANCETS) Misc 1 each by Misc.(Non-Drug; Combo Route) route 2 (two) times a day.    olmesartan-amLODIPin-hcthiazid 40-10-25 mg Tab TAKE 1 TABLET BY MOUTH EVERY DAY    tacrolimus (PROTOPIC) 0.1 % ointment Apply topically 2 (two) times daily. Non-steroid. Safe to use long-term    tiZANidine (ZANAFLEX) 4 MG tablet Take 4 mg by mouth nightly.    triamcinolone acetonide 0.1% (KENALOG) 0.1 % ointment AAA bid prn. Do not use on face, underarms or groin.   Last reviewed on 8/29/2022 12:02 PM by Sonja Rapp MD    Review of patient's allergies indicates:   Allergen Reactions    Fish containing products Itching, Nausea Only and Swelling    Last reviewed on  8/29/2022 12:02 PM by Sonja Rapp      Tasks added this encounter   12/14/2022 - Refill Call (Auto Added)   Tasks due within next 3 months   No tasks due.     [facility]  [address] [place] [address]  Phone: [phone]  Fax: [phone]

## 2022-11-17 ENCOUNTER — PATIENT MESSAGE (OUTPATIENT)
Dept: INTERNAL MEDICINE | Facility: CLINIC | Age: 42
End: 2022-11-17
Payer: COMMERCIAL

## 2022-11-17 ENCOUNTER — LAB VISIT (OUTPATIENT)
Dept: LAB | Facility: HOSPITAL | Age: 42
End: 2022-11-17
Attending: FAMILY MEDICINE
Payer: COMMERCIAL

## 2022-11-17 DIAGNOSIS — E78.01 FAMILIAL HYPERCHOLESTEROLEMIA: ICD-10-CM

## 2022-11-17 DIAGNOSIS — E11.9 TYPE 2 DIABETES, DIET CONTROLLED: ICD-10-CM

## 2022-11-17 DIAGNOSIS — E11.65 UNCONTROLLED TYPE 2 DIABETES MELLITUS WITH HYPERGLYCEMIA: Primary | ICD-10-CM

## 2022-11-17 LAB
CHOLEST SERPL-MCNC: 225 MG/DL (ref 120–199)
CHOLEST/HDLC SERPL: 4.5 {RATIO} (ref 2–5)
ESTIMATED AVG GLUCOSE: 160 MG/DL (ref 68–131)
HBA1C MFR BLD: 7.2 % (ref 4–5.6)
HDLC SERPL-MCNC: 50 MG/DL (ref 40–75)
HDLC SERPL: 22.2 % (ref 20–50)
LDLC SERPL CALC-MCNC: 151.8 MG/DL (ref 63–159)
NONHDLC SERPL-MCNC: 175 MG/DL
TRIGL SERPL-MCNC: 116 MG/DL (ref 30–150)

## 2022-11-17 PROCEDURE — 83036 HEMOGLOBIN GLYCOSYLATED A1C: CPT | Performed by: FAMILY MEDICINE

## 2022-11-17 PROCEDURE — 36415 COLL VENOUS BLD VENIPUNCTURE: CPT | Performed by: FAMILY MEDICINE

## 2022-11-17 PROCEDURE — 80061 LIPID PANEL: CPT | Performed by: FAMILY MEDICINE

## 2022-11-18 RX ORDER — TIRZEPATIDE 5 MG/.5ML
5 INJECTION, SOLUTION SUBCUTANEOUS
Qty: 4 PEN | Refills: 1 | Status: SHIPPED | OUTPATIENT
Start: 2022-11-18 | End: 2023-01-11

## 2022-12-02 ENCOUNTER — TELEPHONE (OUTPATIENT)
Dept: SLEEP MEDICINE | Facility: CLINIC | Age: 42
End: 2022-12-02
Payer: COMMERCIAL

## 2022-12-02 ENCOUNTER — OFFICE VISIT (OUTPATIENT)
Dept: PULMONOLOGY | Facility: CLINIC | Age: 42
End: 2022-12-02
Payer: COMMERCIAL

## 2022-12-02 VITALS
HEART RATE: 90 BPM | OXYGEN SATURATION: 97 % | RESPIRATION RATE: 18 BRPM | BODY MASS INDEX: 42.66 KG/M2 | WEIGHT: 315 LBS | DIASTOLIC BLOOD PRESSURE: 84 MMHG | HEIGHT: 72 IN | SYSTOLIC BLOOD PRESSURE: 136 MMHG

## 2022-12-02 DIAGNOSIS — R06.83 SNORING: ICD-10-CM

## 2022-12-02 DIAGNOSIS — R06.81 WITNESSED APNEIC SPELLS: ICD-10-CM

## 2022-12-02 DIAGNOSIS — G47.30 SLEEP APNEA, UNSPECIFIED TYPE: Primary | ICD-10-CM

## 2022-12-02 DIAGNOSIS — Z71.89 CPAP USE COUNSELING: ICD-10-CM

## 2022-12-02 DIAGNOSIS — Z23 NEED FOR INFLUENZA VACCINATION: ICD-10-CM

## 2022-12-02 PROCEDURE — 3051F PR MOST RECENT HEMOGLOBIN A1C LEVEL 7.0 - < 8.0%: ICD-10-PCS | Mod: CPTII,S$GLB,, | Performed by: INTERNAL MEDICINE

## 2022-12-02 PROCEDURE — 1159F PR MEDICATION LIST DOCUMENTED IN MEDICAL RECORD: ICD-10-PCS | Mod: CPTII,S$GLB,, | Performed by: INTERNAL MEDICINE

## 2022-12-02 PROCEDURE — 1160F PR REVIEW ALL MEDS BY PRESCRIBER/CLIN PHARMACIST DOCUMENTED: ICD-10-PCS | Mod: CPTII,S$GLB,, | Performed by: INTERNAL MEDICINE

## 2022-12-02 PROCEDURE — 3008F PR BODY MASS INDEX (BMI) DOCUMENTED: ICD-10-PCS | Mod: CPTII,S$GLB,, | Performed by: INTERNAL MEDICINE

## 2022-12-02 PROCEDURE — 99204 PR OFFICE/OUTPT VISIT, NEW, LEVL IV, 45-59 MIN: ICD-10-PCS | Mod: 25,S$GLB,, | Performed by: INTERNAL MEDICINE

## 2022-12-02 PROCEDURE — 3079F DIAST BP 80-89 MM HG: CPT | Mod: CPTII,S$GLB,, | Performed by: INTERNAL MEDICINE

## 2022-12-02 PROCEDURE — 90471 IMMUNIZATION ADMIN: CPT | Mod: S$GLB,,, | Performed by: INTERNAL MEDICINE

## 2022-12-02 PROCEDURE — 99999 PR PBB SHADOW E&M-EST. PATIENT-LVL V: CPT | Mod: PBBFAC,,, | Performed by: INTERNAL MEDICINE

## 2022-12-02 PROCEDURE — 90686 IIV4 VACC NO PRSV 0.5 ML IM: CPT | Mod: S$GLB,,, | Performed by: INTERNAL MEDICINE

## 2022-12-02 PROCEDURE — 99999 PR PBB SHADOW E&M-EST. PATIENT-LVL V: ICD-10-PCS | Mod: PBBFAC,,, | Performed by: INTERNAL MEDICINE

## 2022-12-02 PROCEDURE — 1160F RVW MEDS BY RX/DR IN RCRD: CPT | Mod: CPTII,S$GLB,, | Performed by: INTERNAL MEDICINE

## 2022-12-02 PROCEDURE — 3079F PR MOST RECENT DIASTOLIC BLOOD PRESSURE 80-89 MM HG: ICD-10-PCS | Mod: CPTII,S$GLB,, | Performed by: INTERNAL MEDICINE

## 2022-12-02 PROCEDURE — 3008F BODY MASS INDEX DOCD: CPT | Mod: CPTII,S$GLB,, | Performed by: INTERNAL MEDICINE

## 2022-12-02 PROCEDURE — 99204 OFFICE O/P NEW MOD 45 MIN: CPT | Mod: 25,S$GLB,, | Performed by: INTERNAL MEDICINE

## 2022-12-02 PROCEDURE — 3075F SYST BP GE 130 - 139MM HG: CPT | Mod: CPTII,S$GLB,, | Performed by: INTERNAL MEDICINE

## 2022-12-02 PROCEDURE — 90471 FLU VACCINE (QUAD) GREATER THAN OR EQUAL TO 3YO PRESERVATIVE FREE IM: ICD-10-PCS | Mod: S$GLB,,, | Performed by: INTERNAL MEDICINE

## 2022-12-02 PROCEDURE — 1159F MED LIST DOCD IN RCRD: CPT | Mod: CPTII,S$GLB,, | Performed by: INTERNAL MEDICINE

## 2022-12-02 PROCEDURE — 3075F PR MOST RECENT SYSTOLIC BLOOD PRESS GE 130-139MM HG: ICD-10-PCS | Mod: CPTII,S$GLB,, | Performed by: INTERNAL MEDICINE

## 2022-12-02 PROCEDURE — 90686 FLU VACCINE (QUAD) GREATER THAN OR EQUAL TO 3YO PRESERVATIVE FREE IM: ICD-10-PCS | Mod: S$GLB,,, | Performed by: INTERNAL MEDICINE

## 2022-12-02 PROCEDURE — 3051F HG A1C>EQUAL 7.0%<8.0%: CPT | Mod: CPTII,S$GLB,, | Performed by: INTERNAL MEDICINE

## 2022-12-02 NOTE — PROGRESS NOTES
Initial Outpatient Pulmonary Evaluation       SUBJECTIVE:     Chief Complaint   Patient presents with    Sleep Apnea       History of Present Illness:    Patient is a 42 y.o. male presenting to [facility] care for obstructive sleep apnea.      He moved from [place] to [place] , never smoker, known with obstructive sleep apnea tested more than 6 years ago and treated for sleep apnea with CPAP.        STOP - BANG Questionnaire:     1. Snoring : Do you snore loudly ?    Yes    2. Tired : Do you often feel tired, fatigued, or sleepy during daytime? Yes    3. Observed: Has anyone observed you stop breathing during your sleep?   Yes     4. Blood pressure : Do you have or are you being treated for high blood pressure?   Yes    5. BMI :BMI more than 35 kg/m2?   Yes    6. Age : Age over 50 yr old?   No    7. Neck circumference:   For male, is your shirt collar 17 inches / 43cm or larger?  For female, is your shirt collar 16 inches / 41cm or larger?    Yes    8. Gender: Gender male?   Yes    STOP BANG SCORE 7    High risk of LOLLY: Yes 5 - 8  Intermediate risk of LOLLY: Yes 3 - 4  Low risk of LOLLY: Yes 0 - 2      References:   STOP Questionnaire   A Tool to Screen Patients for Obstructive Sleep Apnea: TRES Lerma.C.P.C., Nam Urbina M.B.B.S., Karen Rizzo M.D.,Beatris Benavides, Ph.D., BETTY Cervantes.B.B.S.,_ BETTY Brownlee.Sc.,_ Marina Lea M.D., Jimmy Silva F.R.C.P.C.; Anesthesiology 2008; 108:812-21 Copyright © 2008, the American Society of Anesthesiologists, Inc. [employer]      Review of Systems   Constitutional:  Positive for fatigue. Negative for fever and chills.   HENT:  Negative for nosebleeds.    Eyes:  Negative for redness.   Respiratory:  Positive for apnea, snoring and somnolence. Negative for choking.    Cardiovascular:  Negative for chest pain.   Genitourinary:  Negative for hematuria.   Endocrine:  Negative for  cold intolerance.    Musculoskeletal:  Negative for gait problem.   Gastrointestinal:  Negative for vomiting.   Neurological:  Negative for syncope.   Hematological:  Negative for adenopathy.   Psychiatric/Behavioral:  Positive for sleep disturbance. Negative for confusion.      Review of patient's allergies indicates:   Allergen Reactions    Fish containing products Itching, Nausea Only and Swelling    Lisinopril Shortness Of Breath    Hydrocodone-acetaminophen Nausea Only       Current Outpatient Medications   Medication Sig Dispense Refill    acetaminophen-codeine 300-30mg (TYLENOL #3) 300-30 mg Tab Take 1 tablet by mouth every 4 to 6 hours as needed.      blood sugar diagnostic Strp Use with glucometer daily as needed. Please send accu-chek dawit strips. 100 strip 2    DUPIXENT SYRINGE 300 mg/2 mL Syrg Inject 300mg (1 syringe) into the skin every other week. 4 mL 11    lancets (ACCU-CHEK SOFTCLIX LANCETS) Misc 1 each by Misc.(Non-Drug; Combo Route) route 2 (two) times a day. 100 each 3    olmesartan-amLODIPin-hcthiazid 40-10-25 mg Tab TAKE 1 TABLET BY MOUTH EVERY DAY 90 tablet 2    tacrolimus (PROTOPIC) 0.1 % ointment Apply topically 2 (two) times daily. Non-steroid. Safe to use long-term 60 g 3    tirzepatide (MOUNJARO) 5 mg/0.5 mL PnIj Inject 5 mg into the skin every 7 days. 4 pen 1    tiZANidine (ZANAFLEX) 4 MG tablet Take 4 mg by mouth nightly.      triamcinolone acetonide 0.1% (KENALOG) 0.1 % ointment AAA bid prn. Do not use on face, underarms or groin. 80 g 3    amoxicillin (AMOXIL) 500 MG capsule TAKE 1 CAPSULE BY MOUTH THREE TIMES DAILY UNTIL GONE      blood sugar diagnostic Strp 1 each by Misc.(Non-Drug; Combo Route) route 2 (two) times a day. (Patient not taking: Reported on 12/2/2022) 100 each 3    EPINEPHrine (EPIPEN) 0.3 mg/0.3 mL AtIn Inject 0.3 mLs (0.3 mg total) into the muscle once. for 1 dose 2 Device 3     No current facility-administered medications for this visit.       Past Medical History:    Diagnosis Date    Eczema     HLD (hyperlipidemia)     Primary hypertension      Past Surgical History:   Procedure Laterality Date    FEMUR FRACTURE SURGERY Right      History reviewed. No pertinent family history.  Social History     Tobacco Use    Smoking status: Never    Smokeless tobacco: Never          OBJECTIVE:     Vital Signs (Most Recent)  Vital Signs  Pulse: 90  Resp: 18  SpO2: 97 %  BP: 136/84  Height and Weight  Height: 6' (182.9 cm)  Weight: (!) 159 kg (350 lb 6.7 oz)  BSA (Calculated - sq m): 2.84 sq meters  BMI (Calculated): 47.5  Weight in (lb) to have BMI = 25: 183.9]  Wt Readings from Last 2 Encounters:   12/02/22 (!) 159 kg (350 lb 6.7 oz)   09/20/21 (!) 164.1 kg (361 lb 12.4 oz)         Physical Exam:  Physical Exam   Constitutional: He is oriented to person, place, and time. He appears well-developed. No distress.   HENT:   Head: Normocephalic.   Mouth/Throat: Mallampati Score: IV.   Neck:   21 Inches   Cardiovascular: Normal rate, regular rhythm and normal heart sounds.   Pulmonary/Chest: Normal expansion. No stridor. No respiratory distress. He has decreased breath sounds. He exhibits no tenderness.   Abdominal: He exhibits no distension.   Musculoskeletal:         General: No tenderness.      Cervical back: Neck supple.   Lymphadenopathy:     He has no cervical adenopathy.   Neurological: He is alert and oriented to person, place, and time. Gait normal.   Skin: Skin is warm. No cyanosis. Nails show no clubbing.   Psychiatric: He has a normal mood and affect. His behavior is normal. Judgment and thought content normal.   Nursing note and vitals reviewed.    Laboratory  Lab Results   Component Value Date    WBC 6.38 09/06/2022    RBC 4.64 09/06/2022    HGB 14.0 09/06/2022    HCT 42.5 09/06/2022    MCV 92 09/06/2022    MCH 30.2 09/06/2022    MCHC 32.9 09/06/2022    RDW 13.8 09/06/2022     09/06/2022    MPV 10.1 09/06/2022    GRAN 3.0 09/06/2022    GRAN 46.7 09/06/2022    LYMPH 2.4  09/06/2022    LYMPH 36.8 09/06/2022    MONO 0.6 09/06/2022    MONO 8.6 09/06/2022    EOS 0.4 09/06/2022    BASO 0.10 09/06/2022    EOSINOPHIL 6.1 09/06/2022    BASOPHIL 1.6 09/06/2022       BMP  Lab Results   Component Value Date     09/06/2022    K 3.9 09/06/2022     09/06/2022    CO2 24 09/06/2022    BUN 8 09/06/2022    CREATININE 0.8 09/06/2022    CALCIUM 10.7 (H) 09/06/2022    ANIONGAP 11 09/06/2022    ESTGFRAFRICA >60.0 09/15/2021    EGFRNONAA >60.0 09/15/2021    AST 23 09/06/2022    ALT 23 09/06/2022    PROT 8.3 09/06/2022       No results found for: BNP    No results found for: TSH    No results found for: SEDRATE    No results found for: CRP    Lab Results   Component Value Date     (H) 09/20/2021       Lab Results   Component Value Date    ASPERGILLUS 0.31 (H) 09/20/2021     Lab Results   Component Value Date    AFUMIGATUSCL CLASS 0/1 09/20/2021        No results found for: ACE    Diagnostic Results:  I have personally reviewed today the following studies :        ASSESSMENT/PLAN:     Sleep apnea, unspecified type  -     Ambulatory referral/consult to Pulmonology  -     Home Sleep Study; Future    Snoring  -     Home Sleep Study; Future    Witnessed apneic spells  -     Home Sleep Study; Future    Need for influenza vaccination  -     Influenza - Quadrivalent (PF)    CPAP use counseling  -     Home Sleep Study; Future     Home sleep study    Recommendation regarding CPAP therapy to follow sleep study results.      Received pneumonia vaccine previously.  Influenza vaccination today.    Follow up in about 3 months (around 3/2/2023).    This note was prepared using voice recognition system and is likely to have sound alike errors that may have been overlooked even after proof reading.  Please call me with any questions    Discussed diagnosis, its evaluation, treatment and usual course. All questions answered.    Thank you for the courtesy of participating in the care of this patient    Rohit  BETTY Gomez MD

## 2022-12-02 NOTE — PATIENT INSTRUCTIONS
No eating / drinking for 3 hours before going to bed.  Elevate head of bed 30 - 45 %     CPAP HABITUATION PROCEDURE     Archie Vyas, Ph.D., [facility] and Prince Barrios M.D.  [facility], [facility]     Some people have difficulty adjusting to CPAP/BiPAP/AutoCPAP.  This is not unusual or hard to understand: Breathing with CPAP is different from ordinary breathing, and this difference is aversive to some. The problem can be overcome, however, and the benefits CPAP confers are certainly worth the effort.  Below, you will find a simple and gradual way to get used to CPAP before you try to use it all night, every night.  The essence of this procedure is to relax and let breathing with CPAP become a habit.  It may take about 2 weeks, and involves the following:      CPAP while awake and comfortably seated, during the late evening.     CPAP in bed while attempting sleep at night.     If your discomfort is too great at any time, discontinue and attempt again later the same night, for the same amount of time.   You and your physician may alter the times and pressures if necessary.     If you find that it is very easy to get used to CPAP, you may start using it every night when you are comfortable enough to do so.  IMPORTANT REMINDER: If you have a cold or sinus congestion it is okay to miss a night or two of CPAP. Consider using antihistamines or decongestants to clear up your sinus congestion prior to sleeping.     DAYS  1-3   Start CPAP while awake and comfortably seated during the late evening, after having prepared for bed.  You may do this while watching television, listening to music or reading. Use for 1 hour, then take off CPAP and go directly to bed to sleep     DAYS  4-6     Start CPAP when you go to bed and use for 1 hour, or until you fall asleep.  If your discomfort is too great at any time, discontinue and attempt again later the same night, for the same designated  amount of time (1 hour).      DAYS  7-9     Increase time with CPAP to 2 hours a night.  If your discomfort is too great at any time, discontinue and attempt again later the same night, for the same designated amount of time (2 hours).      DAYS 10-12    Increase time with CPAP to 3 hours a night. If your discomfort is too great at any time, discontinue and attempt again later the same night, for the same designated amount of time (3 hours).      DAYS 13-15     Sleep the entire night with CPAP.      OPTIONAL: You may use Progressive Muscle Relaxation (PMR) to help put you at ease when using CPAP; do PMR twice each day, once in the morning or afternoon, and once in the evening just before using CPAP. You may do PMR prior to any attempt until you are comfortable with CPAP.        Continuous Positive Air Pressure (CPAP)  Continuous positive air pressure (CPAP) uses gentle air pressure to hold the airway open. CPAP is often the most effective treatment for sleep apnea and severe snoring. It works very well for many people. But keep in mind that it can take several adjustments before the setup is right for you.       How CPAP Works  CPAP is a small portable pump beside the bed. The pump sends air through a hose, which is held over your nose and/or mouth by a mask. Mild air pressure is gently pushed through your airway. The air pressure nudges sagging tissues aside. This widens the airway so you can breathe better. CPAP may be combined with other kinds of therapy for sleep apnea.       Types of Air Pressure Treatments  There are different types of CPAP. Your doctor or CPAP technician will help you decide which type is best for you:  Basic CPAP keeps the pressure constant all night long.  A bilevel device (BiPAP) provides more pressure when you breathe in and less when you breathe out. A BiPAP machine also may be set to provide automatic breaths to maintain breathing if you stop breathing while sleeping.  An autoCPAP  device automatically adjusts pressure throughout the night and in response to changes such as body position, sleep stage, and snoring.  © 4427-9155 [employer]. [address] [place] [address]. All rights reserved. This information is not intended as a substitute for professional medical care. Always follow your healthcare professional's instructions.        Snoring and Sleep Apnea: Notes for a Partner  Snoring and sleep apnea affect your life, as well as your partners. You can help in the treatment of the problem. Be supportive. Encourage your partner both to get treatment and to make the adjustments needed to follow through.       Adjusting to Changes  Your partners treatment may involve making changes to certain life habits. You can help your partner make and stick with these changes. For example:  Support and even join your partners exercise program.  Be supportive if your partner gets CPAP (continuous positive airway pressure). He or she may feel self-conscious at first. Remind your partner to expect adjustments to CPAP before it feels just right.  Consider joining a snoring and sleep apnea support group.  Go Along to See the Health Care Provider  You can give the health care provider the best account of your partners nighttime breathing and snoring patterns. Try to go along to health care providers appointments. If you cant go, write notes for your partner to give to the health care provider. Describe your partners snoring and sleep breathing patterns in detail.  Tips for Sleeping with a Snorer  Until treatment takes care of your partners snoring:  Try to go to bed first. It may help if youre already asleep when your partner starts to snore.  Wear earplugs to bed. A fan or other source of background noise may also help drown out snoring.   © 0334-5629 [employer]. [address] [place] [address]. All rights reserved. This information is not intended as a  substitute for professional medical care. Always follow your healthcare professional's instructions.        Continuous Positive Airway Pressure (CPAP)  Your health care provider has prescribed continuous positive airway pressure (CPAP) therapy for you. A CPAP device helps you breathe better at night. The device delivers air through your nose or mouth when you breathe in to keep your air passages open. CPAP is:  Used most often to treat sleep apnea and some other problems (Sleep apnea is a chronic condition with periods of sleep in which you briefly stop breathing.)  Safe and very effective, but it takes time to get used to the mask.   Your health care provider, nurse, or medical supplier will give you tips for wearing and caring for your CPAP device.  General guidelines  It's very important not to give up! It takes time to get used to wearing the mask at night.  Practice using your CPAP device during the day, especially whenever you take a nap.  Remember, there are several different types of masks. If you cant get used to your mask, ask your provider or medical supply company about trying another style.  If you have nasal stuffiness or dryness when using your CPAP device, talk with your provider or medical supply company. There are ways to lessen these problems. For example, your provider may recommend moistening nasal spray or the medical supply company may recommend a device with a humidifier.  The goal is to use your CPAP all night, every night, during all naps, and even when you travel.  Keep your mask clean. Wash it with soap and water. Be sure to rinse the mask and tubing well with water to remove any soap. Let them air-dry thoroughly before using.  Make yourself comfortable when sleeping with CPAP. Try using extra pillows.  Work with your medical supply company so that you know how to correctly use your CPAP. Their representative will be able to help you:  Use the CPAP correctly  Troubleshoot any problems  that come up  Learn to clean and maintain the device  Adjust to regular use of the CPAP  © 6579-2044 [employer]. [address] [place] [address], [place] [address]. All rights reserved. This information is not intended as a substitute for professional medical care. Always follow your healthcare professional's instructions.

## 2022-12-07 DIAGNOSIS — E11.9 TYPE 2 DIABETES MELLITUS WITHOUT COMPLICATION: ICD-10-CM

## 2022-12-09 ENCOUNTER — PATIENT OUTREACH (OUTPATIENT)
Dept: ADMINISTRATIVE | Facility: HOSPITAL | Age: 42
End: 2022-12-09
Payer: COMMERCIAL

## 2022-12-09 NOTE — PROGRESS NOTES
Attempted to contact patient by phone for Diabetic Eye Exam visit, was able to speak to patient. Scheduled patient for Exam on  12/15/2022.

## 2022-12-12 ENCOUNTER — HOSPITAL ENCOUNTER (OUTPATIENT)
Dept: SLEEP MEDICINE | Facility: HOSPITAL | Age: 42
Discharge: HOME | End: 2022-12-12
Attending: FAMILY MEDICINE
Payer: COMMERCIAL

## 2022-12-14 ENCOUNTER — HOSPITAL ENCOUNTER (OUTPATIENT)
Dept: SLEEP MEDICINE | Facility: HOSPITAL | Age: 42
Discharge: HOME | End: 2022-12-14
Attending: INTERNAL MEDICINE
Payer: COMMERCIAL

## 2022-12-14 ENCOUNTER — SPECIALTY PHARMACY (OUTPATIENT)
Dept: PHARMACY | Facility: CLINIC | Age: 42
End: 2022-12-14
Payer: COMMERCIAL

## 2022-12-14 DIAGNOSIS — R06.83 SNORING: ICD-10-CM

## 2022-12-14 DIAGNOSIS — G47.33 OSA (OBSTRUCTIVE SLEEP APNEA): Primary | ICD-10-CM

## 2022-12-14 DIAGNOSIS — R06.81 WITNESSED APNEIC SPELLS: ICD-10-CM

## 2022-12-14 DIAGNOSIS — G47.30 SLEEP APNEA, UNSPECIFIED TYPE: ICD-10-CM

## 2022-12-14 DIAGNOSIS — Z71.89 CPAP USE COUNSELING: ICD-10-CM

## 2022-12-14 PROCEDURE — 95800 PR SLEEP STUDY, UNATTENDED, RECORD HEART RATE/O2 SAT/RESP ANAL/SLEEP TIME: ICD-10-PCS | Mod: 26,,, | Performed by: INTERNAL MEDICINE

## 2022-12-14 PROCEDURE — 95806 SLEEP STUDY UNATT&RESP EFFT: CPT | Performed by: INTERNAL MEDICINE

## 2022-12-14 PROCEDURE — 95800 SLP STDY UNATTENDED: CPT | Mod: 26,,, | Performed by: INTERNAL MEDICINE

## 2022-12-14 NOTE — TELEPHONE ENCOUNTER
Outgoing call regarding dupixent refill; per pt, he's due to inject on 12/22; pt also informed that he's on new med; transferred to [facility]

## 2022-12-14 NOTE — TELEPHONE ENCOUNTER
Specialty Pharmacy - Refill Coordination    Specialty Medication Orders Linked to Encounter      Flowsheet Row Most Recent Value   Medication #1 DUPIXENT SYRINGE 300 mg/2 mL Syrg (Order#[id number], Rx#[id number])            Refill Questions - Documented Responses      Flowsheet Row Most Recent Value   Patient Availability and HIPAA Verification    Does patient want to proceed with activity? Yes   HIPAA/medical authority confirmed? Yes   Relationship to patient of person spoken to? Self   Refill Screening Questions    Changes to allergies? No   Changes to medications? Yes  [Taking Mounjaro. No DDIs.]   New conditions since last clinic visit? No   Unplanned office visit, urgent care, ED, or hospital admission in the last 4 weeks? No   How does patient/caregiver feel medication is working? Good   Financial problems or insurance changes? No   How many doses of your specialty medications were missed in the last 4 weeks? 0   Would patient like to speak to a pharmacist? No   When does the patient need to receive the medication? 12/22/22   Refill Delivery Questions    How will the patient receive the medication? [facility]   When does the patient need to receive the medication? 12/22/22   Shipping Address Home   Address in [facility] confirmed and updated if neccessary? Yes   Expected Copay ($) 0   Is the patient able to afford the medication copay? Yes   Payment Method zero copay   Days supply of Refill 28   Supplies needed? No supplies needed   Refill activity completed? Yes   Refill activity plan Refill scheduled   Shipment/Pickup Date: 12/19/22            Current Outpatient Medications   Medication Sig    acetaminophen-codeine 300-30mg (TYLENOL #3) 300-30 mg Tab Take 1 tablet by mouth every 4 to 6 hours as needed.    amoxicillin (AMOXIL) 500 MG capsule TAKE 1 CAPSULE BY MOUTH THREE TIMES DAILY UNTIL GONE    blood sugar diagnostic Strp 1 each by Misc.(Non-Drug; Combo Route) route 2 (two) times a day. (Patient not  taking: Reported on 12/2/2022)    blood sugar diagnostic Strp Use with glucometer daily as needed. Please send accu-chek dawit strips.    DUPIXENT SYRINGE 300 mg/2 mL Syrg Inject 300mg (1 syringe) into the skin every other week.    EPINEPHrine (EPIPEN) 0.3 mg/0.3 mL AtIn Inject 0.3 mLs (0.3 mg total) into the muscle once. for 1 dose    lancets (ACCU-CHEK SOFTCLIX LANCETS) Misc 1 each by Misc.(Non-Drug; Combo Route) route 2 (two) times a day.    olmesartan-amLODIPin-hcthiazid 40-10-25 mg Tab TAKE 1 TABLET BY MOUTH EVERY DAY    tacrolimus (PROTOPIC) 0.1 % ointment Apply topically 2 (two) times daily. Non-steroid. Safe to use long-term    tirzepatide (MOUNJARO) 5 mg/0.5 mL PnIj Inject 5 mg into the skin every 7 days.    tiZANidine (ZANAFLEX) 4 MG tablet Take 4 mg by mouth nightly.    triamcinolone acetonide 0.1% (KENALOG) 0.1 % ointment AAA bid prn. Do not use on face, underarms or groin.   Last reviewed on 12/2/2022  8:40 AM by Rohit Gomez MD    Review of patient's allergies indicates:   Allergen Reactions    Fish containing products Itching, Nausea Only and Swelling    Lisinopril Shortness Of Breath    Hydrocodone-acetaminophen Nausea Only    Last reviewed on  12/2/2022 8:40 AM by Rohit Gomez      Tasks added this encounter   No tasks added.   Tasks due within next 3 months   12/14/2022 - Refill Call (Auto Added)     Papi MorrisD  [facility] - Special[facility]  [facility] [address]  Phone: [phone]  Fax: [phone]

## 2022-12-14 NOTE — PROCEDURES
PHYSICIAN INTERPRETATION AND COMMENTS: Findings are consistent with moderate obstructive sleep apnea (LOLLY), with  indications of respiratory control instability. In lab CPAP titration , Indications of cardiac dysrhythmia are recorded.  Consult cardiology  CLINICAL HISTORY: 42 year old male presented with: 19.25 inch neck, BMI of 46.1, an [place] sleepiness score of 3, history  of hypertension, diabetes, a previous diagnosis of LOLLY and symptoms of nocturnal snoring and witnessed apneas. Based  on the clinical history, the patient has a high pre-test probability of having Severe LOLLY.  SLEEP STUDY FINDINGS: Patient underwent a 1 night Home Sleep Test and by behavioral criteria, slept for approximately  6.22 hours , with a sleep efficiency of 100%. Moderate sleep disordered breathing (AHI=19) is noted based on a 4% hypopnea  desaturation criteria, with indications of respiratory control instability. When considering more subtle measures of sleep  disordered breathing, the overall respiratory disturbance index is moderate(RDI=26) based on a 1% hypopnea desaturation  criteria with confirmation by surrogate arousal indicators. The apneas/hypopneas are accompanied by minimal oxygen  desaturation (percent time below 90% SpO2: 4%, Min SpO2: 70.1%). The average desaturation across all sleep disordered  breathing events is 6%. Snoring occurs for 29% (30 dB) of the study, 22.2% is very loud. The mean pulse rate is 84.9 BPM,  with frequent pulse rate variability (60 events with >= 6 BPM increase/decrease per hour).  TREATMENT CONSIDERATIONS: Consider an attended CPAP titration study. Presence of respiratory control instability  should be evaluated by a sleep specialist prior to initiating positive pressure treatment. Consider nasal continuous  positive airway pressure based on the AHI severity and co-morbidities.

## 2022-12-14 NOTE — Clinical Note
PHYSICIAN INTERPRETATION AND COMMENTS: Findings are consistent with moderate obstructive sleep apnea (LOLLY), with indications of respiratory control instability. In lab CPAP titration , Indications of cardiac dysrhythmia are recorded. Consult cardiology CLINICAL HISTORY: 42 year old male presented with: 19.25 inch neck, BMI of 46.1, an [place] sleepiness score of 3, history of hypertension, diabetes, a previous diagnosis of LOLLY and symptoms of nocturnal snoring and witnessed apneas. Based on the clinical history, the patient has a high pre-test probability of having Severe LOLLY.

## 2022-12-15 ENCOUNTER — OFFICE VISIT (OUTPATIENT)
Dept: OPHTHALMOLOGY | Facility: CLINIC | Age: 42
End: 2022-12-15
Payer: COMMERCIAL

## 2022-12-15 DIAGNOSIS — E11.9 TYPE 2 DIABETES MELLITUS WITHOUT RETINOPATHY: Primary | ICD-10-CM

## 2022-12-15 DIAGNOSIS — H52.4 MYOPIA WITH ASTIGMATISM AND PRESBYOPIA, BILATERAL: ICD-10-CM

## 2022-12-15 DIAGNOSIS — H52.203 MYOPIA WITH ASTIGMATISM AND PRESBYOPIA, BILATERAL: ICD-10-CM

## 2022-12-15 DIAGNOSIS — G47.33 MODERATE OBSTRUCTIVE SLEEP APNEA: Primary | ICD-10-CM

## 2022-12-15 DIAGNOSIS — H52.13 MYOPIA WITH ASTIGMATISM AND PRESBYOPIA, BILATERAL: ICD-10-CM

## 2022-12-15 PROCEDURE — 1160F RVW MEDS BY RX/DR IN RCRD: CPT | Mod: CPTII,S$GLB,, | Performed by: OPTOMETRIST

## 2022-12-15 PROCEDURE — 99999 PR PBB SHADOW E&M-EST. PATIENT-LVL III: ICD-10-PCS | Mod: PBBFAC,,, | Performed by: OPTOMETRIST

## 2022-12-15 PROCEDURE — 3051F PR MOST RECENT HEMOGLOBIN A1C LEVEL 7.0 - < 8.0%: ICD-10-PCS | Mod: CPTII,S$GLB,, | Performed by: OPTOMETRIST

## 2022-12-15 PROCEDURE — 99999 PR PBB SHADOW E&M-EST. PATIENT-LVL III: CPT | Mod: PBBFAC,,, | Performed by: OPTOMETRIST

## 2022-12-15 PROCEDURE — 1160F PR REVIEW ALL MEDS BY PRESCRIBER/CLIN PHARMACIST DOCUMENTED: ICD-10-PCS | Mod: CPTII,S$GLB,, | Performed by: OPTOMETRIST

## 2022-12-15 PROCEDURE — 92004 COMPRE OPH EXAM NEW PT 1/>: CPT | Mod: S$GLB,,, | Performed by: OPTOMETRIST

## 2022-12-15 PROCEDURE — 92015 PR REFRACTION: ICD-10-PCS | Mod: S$GLB,,, | Performed by: OPTOMETRIST

## 2022-12-15 PROCEDURE — 1159F PR MEDICATION LIST DOCUMENTED IN MEDICAL RECORD: ICD-10-PCS | Mod: CPTII,S$GLB,, | Performed by: OPTOMETRIST

## 2022-12-15 PROCEDURE — 1159F MED LIST DOCD IN RCRD: CPT | Mod: CPTII,S$GLB,, | Performed by: OPTOMETRIST

## 2022-12-15 PROCEDURE — 92004 PR EYE EXAM, NEW PATIENT,COMPREHESV: ICD-10-PCS | Mod: S$GLB,,, | Performed by: OPTOMETRIST

## 2022-12-15 PROCEDURE — 2023F DILAT RTA XM W/O RTNOPTHY: CPT | Mod: CPTII,S$GLB,, | Performed by: OPTOMETRIST

## 2022-12-15 PROCEDURE — 92015 DETERMINE REFRACTIVE STATE: CPT | Mod: S$GLB,,, | Performed by: OPTOMETRIST

## 2022-12-15 PROCEDURE — 2023F PR DILATED RETINAL EXAM W/O EVID OF RETINOPATHY: ICD-10-PCS | Mod: CPTII,S$GLB,, | Performed by: OPTOMETRIST

## 2022-12-15 PROCEDURE — 3051F HG A1C>EQUAL 7.0%<8.0%: CPT | Mod: CPTII,S$GLB,, | Performed by: OPTOMETRIST

## 2022-12-15 NOTE — PROGRESS NOTES
HPI     Diabetic Eye Exam            Comments: Vision changes since last eye exam?: yes     Any eye pain today: no    Other ocular symptoms: no    Interested in contact lens fitting today? No    Lab Results       Component                Value               Date                       HGBA1C                   7.2 (H)             11/17/2022                  1. DM  2. +htn                                   Comments                         Last edited by Maria M Gonzalez on 12/15/2022  8:14 AM.            Assessment /Plan     For exam results, see Encounter Report.    Type 2 diabetes mellitus without retinopathy  There was no diabetic retinopathy present in either eye today.   Recommended that pt continue care with PCP and/or specialists regarding diabetes.  Follow-up dilated eye exam recommended in 12 months, sooner with any vision changes or new concerns.    Myopia with astigmatism and presbyopia, bilateral  Spec Rx Optional as pt is asymptomatic and has never worn spec rx    Daryl today: see above    Eyeglass Final Rx       Eyeglass Final Rx         Sphere Cylinder Axis    Right -4.00 +3.00 100    Left -5.25 +4.00 025      Expiration Date: 12/15/2023   PD=69                 mOCT nl OU     RTC 1 yr for dilated eye exam and K topography or PRN if any problems.   Discussed above and answered questions.

## 2023-01-04 ENCOUNTER — PATIENT MESSAGE (OUTPATIENT)
Dept: PULMONOLOGY | Facility: CLINIC | Age: 43
End: 2023-01-04
Payer: COMMERCIAL

## 2023-01-12 ENCOUNTER — SPECIALTY PHARMACY (OUTPATIENT)
Dept: PHARMACY | Facility: CLINIC | Age: 43
End: 2023-01-12
Payer: COMMERCIAL

## 2023-01-12 NOTE — TELEPHONE ENCOUNTER
Specialty Pharmacy - Refill Coordination    Specialty Medication Orders Linked to Encounter      Flowsheet Row Most Recent Value   Medication #1 DUPIXENT SYRINGE 300 mg/2 mL Syrg (Order#[id number], Rx#[id number])            Refill Questions - Documented Responses      Flowsheet Row Most Recent Value   Patient Availability and HIPAA Verification    Does patient want to proceed with activity? Yes   HIPAA/medical authority confirmed? Yes   Relationship to patient of person spoken to? Self   Refill Screening Questions    Changes to allergies? No   Changes to medications? No   New conditions since last clinic visit? No   Unplanned office visit, urgent care, ED, or hospital admission in the last 4 weeks? No   How does patient/caregiver feel medication is working? Good   Financial problems or insurance changes? No   How many doses of your specialty medications were missed in the last 4 weeks? 0   Would patient like to speak to a pharmacist? No   When does the patient need to receive the medication? 01/19/23   Refill Delivery Questions    How will the patient receive the medication? MEDRx   When does the patient need to receive the medication? 01/19/23   Shipping Address Home   Address in [facility] confirmed and updated if neccessary? Yes   Expected Copay ($) 0   Is the patient able to afford the medication copay? Yes   Payment Method zero copay   Days supply of Refill 28   Supplies needed? No supplies needed   Refill activity completed? Yes   Refill activity plan Refill scheduled   Shipment/Pickup Date: 01/17/23            Current Outpatient Medications   Medication Sig    acetaminophen-codeine 300-30mg (TYLENOL #3) 300-30 mg Tab Take 1 tablet by mouth every 4 to 6 hours as needed.    amoxicillin (AMOXIL) 500 MG capsule TAKE 1 CAPSULE BY MOUTH THREE TIMES DAILY UNTIL GONE    blood sugar diagnostic Strp 1 each by Misc.(Non-Drug; Combo Route) route 2 (two) times a day. (Patient not taking: Reported on 12/2/2022)     blood sugar diagnostic Strp Use with glucometer daily as needed. Please send accu-chek dawit strips.    DUPIXENT SYRINGE 300 mg/2 mL Syrg Inject 300mg (1 syringe) into the skin every other week.    EPINEPHrine (EPIPEN) 0.3 mg/0.3 mL AtIn Inject 0.3 mLs (0.3 mg total) into the muscle once. for 1 dose    lancets (ACCU-CHEK SOFTCLIX LANCETS) Misc 1 each by Misc.(Non-Drug; Combo Route) route 2 (two) times a day.    olmesartan-amLODIPin-hcthiazid 40-10-25 mg Tab TAKE 1 TABLET BY MOUTH EVERY DAY    tacrolimus (PROTOPIC) 0.1 % ointment Apply topically 2 (two) times daily. Non-steroid. Safe to use long-term    tirzepatide (MOUNJARO) 5 mg/0.5 mL PnIj INJECT 5 MG UNDER THE SKIN EVERY 7 DAYS    tiZANidine (ZANAFLEX) 4 MG tablet Take 4 mg by mouth nightly.    triamcinolone acetonide 0.1% (KENALOG) 0.1 % ointment AAA bid prn. Do not use on face, underarms or groin.   Last reviewed on 12/15/2022  8:36 AM by Eder Bazzi, HEYDI    Review of patient's allergies indicates:   Allergen Reactions    Fish containing products Itching, Nausea Only and Swelling    Lisinopril Shortness Of Breath    Hydrocodone-acetaminophen Nausea Only    Last reviewed on  12/15/2022 8:36 AM by Eder Bazzi      Tasks added this encounter   2/9/2023 - Refill Call (Auto Added)   Tasks due within next 3 months   3/20/2023 - Clinical - Follow Up Assesement (Annual)     [facility] - Specialty Pharmacy  [address] [address]  Phone: [phone]  Fax: [phone]

## 2023-02-09 ENCOUNTER — SPECIALTY PHARMACY (OUTPATIENT)
Dept: PHARMACY | Facility: CLINIC | Age: 43
End: 2023-02-09
Payer: COMMERCIAL

## 2023-02-09 NOTE — TELEPHONE ENCOUNTER
Outgoing call regarding Dupixent erfill, pt decline refill, stated his new insurance does not become active until 2/12 due to switching jobs. Pt request a follow up on 2/14

## 2023-02-15 NOTE — TELEPHONE ENCOUNTER
Dupixent PA cancelled because it was entered as a continuation of therapy. Although the medication is a continuation of therapy, the insurance company sees it as new therapy since it is a new insurance policy. PA resubmitted as a new start. Key: [id number]

## 2023-02-16 NOTE — TELEPHONE ENCOUNTER
Specialty Pharmacy - Refill Coordination  Specialty Pharmacy - Medication/Referral Authorization    Specialty Medication Orders Linked to Encounter      Flowsheet Row Most Recent Value   Medication #1 DUPIXENT SYRINGE 300 mg/2 mL Syrg (Order#[id number], Rx#[id number])            Refill Questions - Documented Responses      Flowsheet Row Most Recent Value   Patient Availability and HIPAA Verification    Does patient want to proceed with activity? Yes   HIPAA/medical authority confirmed? Yes   Relationship to patient of person spoken to? Self   Refill Screening Questions    Changes to allergies? No   Changes to medications? No   New conditions since last clinic visit? No   Unplanned office visit, urgent care, ED, or hospital admission in the last 4 weeks? No   How does patient/caregiver feel medication is working? Good   Financial problems or insurance changes? No   How many doses of your specialty medications were missed in the last 4 weeks? 0   Would patient like to speak to a pharmacist? No   When does the patient need to receive the medication? 02/21/23   Refill Delivery Questions    How will the patient receive the medication? MEDRx   When does the patient need to receive the medication? 02/21/23   Shipping Address Home   Address in [facility] confirmed and updated if neccessary? Yes   Expected Copay ($) 0   Is the patient able to afford the medication copay? Yes   Payment Method zero copay   Days supply of Refill 28   Supplies needed? No supplies needed   Refill activity completed? Yes   Refill activity plan Refill scheduled   Shipment/Pickup Date: 02/20/23            Current Outpatient Medications   Medication Sig    acetaminophen-codeine 300-30mg (TYLENOL #3) 300-30 mg Tab Take 1 tablet by mouth every 4 to 6 hours as needed.    amoxicillin (AMOXIL) 500 MG capsule TAKE 1 CAPSULE BY MOUTH THREE TIMES DAILY UNTIL GONE    blood sugar diagnostic Strp 1 each by Misc.(Non-Drug; Combo Route) route 2 (two) times  a day. (Patient not taking: Reported on 12/2/2022)    blood sugar diagnostic Strp Use with glucometer daily as needed. Please send accu-chek dawit strips.    DUPIXENT SYRINGE 300 mg/2 mL Syrg Inject 300mg (1 syringe) into the skin every other week.    EPINEPHrine (EPIPEN) 0.3 mg/0.3 mL AtIn Inject 0.3 mLs (0.3 mg total) into the muscle once. for 1 dose    lancets (ACCU-CHEK SOFTCLIX LANCETS) Misc 1 each by Misc.(Non-Drug; Combo Route) route 2 (two) times a day.    olmesartan-amLODIPin-hcthiazid 40-10-25 mg Tab TAKE 1 TABLET BY MOUTH EVERY DAY    tacrolimus (PROTOPIC) 0.1 % ointment Apply topically 2 (two) times daily. Non-steroid. Safe to use long-term    tirzepatide (MOUNJARO) 5 mg/0.5 mL PnIj INJECT 5 MG UNDER THE SKIN EVERY 7 DAYS    tiZANidine (ZANAFLEX) 4 MG tablet Take 4 mg by mouth nightly.    triamcinolone acetonide 0.1% (KENALOG) 0.1 % ointment AAA bid prn. Do not use on face, underarms or groin.   Last reviewed on 12/15/2022  8:36 AM by Eder Bazzi, HEYDI    Review of patient's allergies indicates:   Allergen Reactions    Fish containing products Itching, Nausea Only and Swelling    Lisinopril Shortness Of Breath    Hydrocodone-acetaminophen Nausea Only    Last reviewed on  12/15/2022 8:36 AM by Eder Bazzi      Tasks added this encounter   2/14/2023 - Referral Authorization - Reauthorization  3/14/2023 - Refill Call (Auto Added)   Tasks due within next 3 months   3/20/2023 - Clinical - Follow Up Assesement (Annual)     [facility] - Specialty Pharmacy  [address] [place] [id number]  Phone: [phone]  Fax: [phone]

## 2023-02-21 DIAGNOSIS — L20.89 OTHER ATOPIC DERMATITIS: ICD-10-CM

## 2023-02-21 DIAGNOSIS — Z79.899 ENCOUNTER FOR LONG-TERM (CURRENT) USE OF MEDICATIONS: ICD-10-CM

## 2023-02-21 NOTE — TELEPHONE ENCOUNTER
----- Message from Marian Wagner sent at 2/21/2023  4:03 PM CST -----  Contact: [id number]  Type:  RX Refill Request    Who Called: Chelsea  Refill or New Rx:refill   RX Name and Strength:DUPIXENT SYRINGE 300 mg/2 mL Syrg  How is the patient currently taking it? (ex. 1XDay):1 every 28 days   Is this a 30 day or 90 day RX:syringe   Preferred Pharmacy with phone number:  [facility]   [address] [address]  Phone:[phone]  Local or Mail Order:mail   Ordering Provider:Dr Rapp   Would the patient rather a call back or a response via [facility]? Call back   Best Call Back Number:[phone]  Additional Information:

## 2023-02-22 ENCOUNTER — PATIENT MESSAGE (OUTPATIENT)
Dept: INTERNAL MEDICINE | Facility: CLINIC | Age: 43
End: 2023-02-22
Payer: COMMERCIAL

## 2023-02-22 RX ORDER — DUPILUMAB 300 MG/2ML
INJECTION, SOLUTION SUBCUTANEOUS
Qty: 4 ML | Refills: 5 | Status: SHIPPED | OUTPATIENT
Start: 2023-02-22 | End: 2023-10-09 | Stop reason: SDUPTHER

## 2023-02-22 NOTE — TELEPHONE ENCOUNTER
Dupixent PA approved 2/12/23 - 6/8/23.   Benefits. $0 deductible. $6000 max out of pocket. Expected copayment - $1,063.53. According to the [employer] help desk, the patient may use any specialty pharmacy. However, the patient has chosen to use [facility] mail order. MD already sent a new Rx to [facility]. Pt is aware that his referral at [facility] will be closed out.

## 2023-02-23 RX ORDER — SEMAGLUTIDE 1.34 MG/ML
1 INJECTION, SOLUTION SUBCUTANEOUS
Qty: 2 PEN | Refills: 0 | Status: SHIPPED | OUTPATIENT
Start: 2023-02-23 | End: 2023-03-22

## 2023-03-10 ENCOUNTER — OFFICE VISIT (OUTPATIENT)
Dept: PULMONOLOGY | Facility: CLINIC | Age: 43
End: 2023-03-10
Payer: COMMERCIAL

## 2023-03-10 ENCOUNTER — HOSPITAL ENCOUNTER (OUTPATIENT)
Dept: CARDIOLOGY | Facility: HOSPITAL | Age: 43
Discharge: HOME | End: 2023-03-10
Attending: INTERNAL MEDICINE
Payer: COMMERCIAL

## 2023-03-10 ENCOUNTER — HOSPITAL ENCOUNTER (OUTPATIENT)
Dept: RADIOLOGY | Facility: HOSPITAL | Age: 43
Discharge: HOME | End: 2023-03-10
Attending: INTERNAL MEDICINE
Payer: COMMERCIAL

## 2023-03-10 VITALS
DIASTOLIC BLOOD PRESSURE: 78 MMHG | HEIGHT: 72 IN | BODY MASS INDEX: 42.66 KG/M2 | SYSTOLIC BLOOD PRESSURE: 112 MMHG | RESPIRATION RATE: 18 BRPM | WEIGHT: 315 LBS | HEART RATE: 96 BPM | OXYGEN SATURATION: 97 %

## 2023-03-10 DIAGNOSIS — I49.9 CARDIAC ARRHYTHMIA, UNSPECIFIED CARDIAC ARRHYTHMIA TYPE: ICD-10-CM

## 2023-03-10 DIAGNOSIS — G47.33 OSA ON CPAP: ICD-10-CM

## 2023-03-10 DIAGNOSIS — R76.8 ELEVATED IGE LEVEL: ICD-10-CM

## 2023-03-10 DIAGNOSIS — Z23 NEED FOR VACCINATION AGAINST STREPTOCOCCUS PNEUMONIAE: Primary | ICD-10-CM

## 2023-03-10 PROCEDURE — 3072F LOW RISK FOR RETINOPATHY: CPT | Mod: CPTII,S$GLB,, | Performed by: INTERNAL MEDICINE

## 2023-03-10 PROCEDURE — 3078F DIAST BP <80 MM HG: CPT | Mod: CPTII,S$GLB,, | Performed by: INTERNAL MEDICINE

## 2023-03-10 PROCEDURE — 90677 PCV20 VACCINE IM: CPT | Mod: S$GLB,,, | Performed by: INTERNAL MEDICINE

## 2023-03-10 PROCEDURE — 90471 PNEUMOCOCCAL CONJUGATE VACCINE 20-VALENT: ICD-10-PCS | Mod: S$GLB,,, | Performed by: INTERNAL MEDICINE

## 2023-03-10 PROCEDURE — 3074F SYST BP LT 130 MM HG: CPT | Mod: CPTII,S$GLB,, | Performed by: INTERNAL MEDICINE

## 2023-03-10 PROCEDURE — 99214 OFFICE O/P EST MOD 30 MIN: CPT | Mod: 25,S$GLB,, | Performed by: INTERNAL MEDICINE

## 2023-03-10 PROCEDURE — 90471 IMMUNIZATION ADMIN: CPT | Mod: S$GLB,,, | Performed by: INTERNAL MEDICINE

## 2023-03-10 PROCEDURE — 93010 EKG 12-LEAD: ICD-10-PCS | Mod: ,,, | Performed by: INTERNAL MEDICINE

## 2023-03-10 PROCEDURE — 71046 XR CHEST PA AND LATERAL: ICD-10-PCS | Mod: 26,,, | Performed by: RADIOLOGY

## 2023-03-10 PROCEDURE — 90677 PNEUMOCOCCAL CONJUGATE VACCINE 20-VALENT: ICD-10-PCS | Mod: S$GLB,,, | Performed by: INTERNAL MEDICINE

## 2023-03-10 PROCEDURE — 71046 X-RAY EXAM CHEST 2 VIEWS: CPT | Mod: TC

## 2023-03-10 PROCEDURE — 99999 PR PBB SHADOW E&M-EST. PATIENT-LVL V: ICD-10-PCS | Mod: PBBFAC,,, | Performed by: INTERNAL MEDICINE

## 2023-03-10 PROCEDURE — 99214 PR OFFICE/OUTPT VISIT, EST, LEVL IV, 30-39 MIN: ICD-10-PCS | Mod: 25,S$GLB,, | Performed by: INTERNAL MEDICINE

## 2023-03-10 PROCEDURE — 99999 PR PBB SHADOW E&M-EST. PATIENT-LVL V: CPT | Mod: PBBFAC,,, | Performed by: INTERNAL MEDICINE

## 2023-03-10 PROCEDURE — 1159F MED LIST DOCD IN RCRD: CPT | Mod: CPTII,S$GLB,, | Performed by: INTERNAL MEDICINE

## 2023-03-10 PROCEDURE — 3078F PR MOST RECENT DIASTOLIC BLOOD PRESSURE < 80 MM HG: ICD-10-PCS | Mod: CPTII,S$GLB,, | Performed by: INTERNAL MEDICINE

## 2023-03-10 PROCEDURE — 71046 X-RAY EXAM CHEST 2 VIEWS: CPT | Mod: 26,,, | Performed by: RADIOLOGY

## 2023-03-10 PROCEDURE — 3074F PR MOST RECENT SYSTOLIC BLOOD PRESSURE < 130 MM HG: ICD-10-PCS | Mod: CPTII,S$GLB,, | Performed by: INTERNAL MEDICINE

## 2023-03-10 PROCEDURE — 3072F PR LOW RISK FOR RETINOPATHY: ICD-10-PCS | Mod: CPTII,S$GLB,, | Performed by: INTERNAL MEDICINE

## 2023-03-10 PROCEDURE — 93010 ELECTROCARDIOGRAM REPORT: CPT | Mod: ,,, | Performed by: INTERNAL MEDICINE

## 2023-03-10 PROCEDURE — 3008F PR BODY MASS INDEX (BMI) DOCUMENTED: ICD-10-PCS | Mod: CPTII,S$GLB,, | Performed by: INTERNAL MEDICINE

## 2023-03-10 PROCEDURE — 93005 ELECTROCARDIOGRAM TRACING: CPT

## 2023-03-10 PROCEDURE — 3008F BODY MASS INDEX DOCD: CPT | Mod: CPTII,S$GLB,, | Performed by: INTERNAL MEDICINE

## 2023-03-10 PROCEDURE — 1159F PR MEDICATION LIST DOCUMENTED IN MEDICAL RECORD: ICD-10-PCS | Mod: CPTII,S$GLB,, | Performed by: INTERNAL MEDICINE

## 2023-03-10 RX ORDER — BALOXAVIR MARBOXIL 80 MG/1
1 TABLET, FILM COATED ORAL ONCE
COMMUNITY
Start: 2023-01-04 | End: 2023-03-30

## 2023-03-10 NOTE — PROGRESS NOTES
Pulmonary Outpatient Follow Up Visit     Subjective:       Patient ID: Krish Ramos is a 42 y.o. male.    Chief Complaint: Sleep Apnea      HPI        Patient is a 42 y.o. male presenting for 3 months follow-up.      Initially evaluated December 2022 to establish care for obstructive sleep apnea.      Moderate obstructive sleep apnea suboptimal compliance with CPAP.  Home sleep study detected AHI 90 events per hour.  Possible cardiac dysrhythmia.          He moved from [place] to [place] , never smoker, known with obstructive sleep apnea tested more than 6 years ago and treated for sleep apnea with CPAP.      Dupixent for eczema.  Has environmental allergies elevated IgE on antihistamine    Review of Systems   Constitutional:  Negative for fever and chills.   HENT:  Negative for nosebleeds.    Eyes:  Negative for redness.   Respiratory:  Positive for apnea and snoring. Negative for choking.    Cardiovascular:  Negative for chest pain.        Suspected dysrhythmia   Genitourinary:  Negative for hematuria.   Endocrine:  Negative for cold intolerance.    Musculoskeletal:  Positive for arthralgias.   Skin:  Positive for rash.   Gastrointestinal:  Negative for vomiting.   Neurological:  Negative for syncope.   Hematological:  Negative for adenopathy.   Psychiatric/Behavioral:  Negative for confusion.      Outpatient Encounter Medications as of 3/10/2023   Medication Sig Dispense Refill    acetaminophen-codeine 300-30mg (TYLENOL #3) 300-30 mg Tab Take 1 tablet by mouth every 4 to 6 hours as needed.      blood sugar diagnostic Strp Use with glucometer daily as needed. Please send accu-chek dawit strips. 100 strip 2    DUPIXENT SYRINGE 300 mg/2 mL Syrg Inject 300mg (1 syringe) into the skin every other week. 4 mL 5    lancets (ACCU-CHEK SOFTCLIX LANCETS) Misc 1 each by Misc.(Non-Drug; Combo Route) route 2 (two) times a day. 100 each 3    olmesartan-amLODIPin-hcthiazid 40-10-25 mg  Tab TAKE 1 TABLET BY MOUTH EVERY DAY 90 tablet 2    semaglutide (OZEMPIC) 1 mg/dose (4 mg/3 mL) Inject 1 mg into the skin every 7 days. 2 pen 0    tacrolimus (PROTOPIC) 0.1 % ointment Apply topically 2 (two) times daily. Non-steroid. Safe to use long-term 60 g 3    tiZANidine (ZANAFLEX) 4 MG tablet Take 4 mg by mouth nightly.      triamcinolone acetonide 0.1% (KENALOG) 0.1 % ointment AAA bid prn. Do not use on face, underarms or groin. 80 g 3    EPINEPHrine (EPIPEN) 0.3 mg/0.3 mL AtIn Inject 0.3 mLs (0.3 mg total) into the muscle once. for 1 dose 2 Device 3    XOFLUZA 80 mg tablet Take 1 tablet by mouth once.      [DISCONTINUED] amoxicillin (AMOXIL) 500 MG capsule TAKE 1 CAPSULE BY MOUTH THREE TIMES DAILY UNTIL GONE      [DISCONTINUED] blood sugar diagnostic Strp 1 each by Misc.(Non-Drug; Combo Route) route 2 (two) times a day. (Patient not taking: Reported on 12/2/2022) 100 each 3    [DISCONTINUED] DUPIXENT SYRINGE 300 mg/2 mL Syrg Inject 300mg (1 syringe) into the skin every other week. 4 mL 11    [DISCONTINUED] tirzepatide (MOUNJARO) 5 mg/0.5 mL PnIj INJECT 5 MG UNDER THE SKIN EVERY 7 DAYS 4 pen 1     No facility-administered encounter medications on file as of 3/10/2023.       Objective:     Vital Signs (Most Recent)  Vital Signs  Pulse: 96  Resp: 18  SpO2: 97 %  BP: 112/78  Height and Weight  Height: 6' (182.9 cm)  Weight: (!) 152.9 kg (337 lb)  BSA (Calculated - sq m): 2.79 sq meters  BMI (Calculated): 45.7  Weight in (lb) to have BMI = 25: 183.9]  Wt Readings from Last 2 Encounters:   03/10/23 (!) 152.9 kg (337 lb)   12/02/22 (!) 159 kg (350 lb 6.7 oz)       Physical Exam   Constitutional: He is oriented to person, place, and time. He appears well-developed. No distress.   HENT:   Head: Normocephalic.   Mouth/Throat: Mallampati Score: IV.   Neck:   21 Inches   Cardiovascular: Normal rate, regular rhythm and normal heart sounds.   Pulmonary/Chest: Normal expansion. No stridor. No respiratory distress. He has  decreased breath sounds. He exhibits no tenderness.   Abdominal: He exhibits no distension.   Musculoskeletal:         General: No tenderness.      Cervical back: Neck supple.   Lymphadenopathy:     He has no cervical adenopathy.   Neurological: He is alert and oriented to person, place, and time. Gait normal.   Skin: Skin is warm. No cyanosis. Nails show no clubbing.   Psychiatric: He has a normal mood and affect. His behavior is normal. Judgment and thought content normal.   Nursing note and vitals reviewed.    Laboratory  Lab Results   Component Value Date    WBC 6.38 09/06/2022    RBC 4.64 09/06/2022    HGB 14.0 09/06/2022    HCT 42.5 09/06/2022    MCV 92 09/06/2022    MCH 30.2 09/06/2022    MCHC 32.9 09/06/2022    RDW 13.8 09/06/2022     09/06/2022    MPV 10.1 09/06/2022    GRAN 3.0 09/06/2022    GRAN 46.7 09/06/2022    LYMPH 2.4 09/06/2022    LYMPH 36.8 09/06/2022    MONO 0.6 09/06/2022    MONO 8.6 09/06/2022    EOS 0.4 09/06/2022    BASO 0.10 09/06/2022    EOSINOPHIL 6.1 09/06/2022    BASOPHIL 1.6 09/06/2022       BMP  Lab Results   Component Value Date     09/06/2022    K 3.9 09/06/2022     09/06/2022    CO2 24 09/06/2022    BUN 8 09/06/2022    CREATININE 0.8 09/06/2022    CALCIUM 10.7 (H) 09/06/2022    ANIONGAP 11 09/06/2022    ESTGFRAFRICA >60.0 09/15/2021    EGFRNONAA >60.0 09/15/2021    AST 23 09/06/2022    ALT 23 09/06/2022    PROT 8.3 09/06/2022       No results found for: BNP    No results found for: TSH    No results found for: SEDRATE    No results found for: CRP  Lab Results   Component Value Date     (H) 09/20/2021        Lab Results   Component Value Date    ASPERGILLUS 0.31 (H) 09/20/2021     Lab Results   Component Value Date    AFUMIGATUSCL CLASS 0/1 09/20/2021        No results found for: ACE     Diagnostic Results:  I have personally reviewed today the following studies:            Assessment/Plan:   Need for vaccination against Streptococcus pneumoniae  -     (In  Office Administered) Pneumococcal Conjugate Vaccine (20 Valent) (IM)    Cardiac arrhythmia, unspecified cardiac arrhythmia type  -     Ambulatory referral/consult to Cardiology; Future; Expected date: 03/17/2023  -     EKG 12-lead; Future    Elevated IgE level  -     X-Ray Chest PA And Lateral; Future    LOLLY on CPAP      Improve CPAP compliance.  Nasal cushion     Check EKG.  Cardiology referral for cardiac monitoring.      Continue Dupixent.      Elevated IgE continue antihistamine.  Check chest x-ray.  Denies coughing wheezing or history of asthma.      PCV 20 today.  Yearly influenza vaccination.  Follow up in about 6 months (around 9/10/2023).    This note was prepared using voice recognition system and is likely to have sound alike errors that may have been overlooked even after proof reading.  Please call me with any questions    Discussed diagnosis, its evaluation, treatment and usual course. All questions answered.      Rohit Gomez MD

## 2023-03-30 ENCOUNTER — OFFICE VISIT (OUTPATIENT)
Dept: CARDIOLOGY | Facility: CLINIC | Age: 43
End: 2023-03-30
Payer: COMMERCIAL

## 2023-03-30 VITALS
OXYGEN SATURATION: 98 % | DIASTOLIC BLOOD PRESSURE: 75 MMHG | WEIGHT: 315 LBS | SYSTOLIC BLOOD PRESSURE: 135 MMHG | BODY MASS INDEX: 45.57 KG/M2 | HEART RATE: 95 BPM

## 2023-03-30 DIAGNOSIS — I49.9 CARDIAC ARRHYTHMIA, UNSPECIFIED CARDIAC ARRHYTHMIA TYPE: ICD-10-CM

## 2023-03-30 DIAGNOSIS — E11.69 TYPE 2 DIABETES MELLITUS WITH OTHER SPECIFIED COMPLICATION, UNSPECIFIED WHETHER LONG TERM INSULIN USE: ICD-10-CM

## 2023-03-30 DIAGNOSIS — G47.33 OSA (OBSTRUCTIVE SLEEP APNEA): ICD-10-CM

## 2023-03-30 DIAGNOSIS — I10 PRIMARY HYPERTENSION: ICD-10-CM

## 2023-03-30 DIAGNOSIS — E66.01 SEVERE OBESITY (BMI >= 40): ICD-10-CM

## 2023-03-30 DIAGNOSIS — E78.49 OTHER HYPERLIPIDEMIA: ICD-10-CM

## 2023-03-30 DIAGNOSIS — R94.31 NONSPECIFIC ABNORMAL ELECTROCARDIOGRAM (ECG) (EKG): Primary | ICD-10-CM

## 2023-03-30 PROCEDURE — 3008F BODY MASS INDEX DOCD: CPT | Mod: CPTII,S$GLB,, | Performed by: INTERNAL MEDICINE

## 2023-03-30 PROCEDURE — 1159F MED LIST DOCD IN RCRD: CPT | Mod: CPTII,S$GLB,, | Performed by: INTERNAL MEDICINE

## 2023-03-30 PROCEDURE — 3072F PR LOW RISK FOR RETINOPATHY: ICD-10-PCS | Mod: CPTII,S$GLB,, | Performed by: INTERNAL MEDICINE

## 2023-03-30 PROCEDURE — 3075F PR MOST RECENT SYSTOLIC BLOOD PRESS GE 130-139MM HG: ICD-10-PCS | Mod: CPTII,S$GLB,, | Performed by: INTERNAL MEDICINE

## 2023-03-30 PROCEDURE — 3078F DIAST BP <80 MM HG: CPT | Mod: CPTII,S$GLB,, | Performed by: INTERNAL MEDICINE

## 2023-03-30 PROCEDURE — 1159F PR MEDICATION LIST DOCUMENTED IN MEDICAL RECORD: ICD-10-PCS | Mod: CPTII,S$GLB,, | Performed by: INTERNAL MEDICINE

## 2023-03-30 PROCEDURE — 3078F PR MOST RECENT DIASTOLIC BLOOD PRESSURE < 80 MM HG: ICD-10-PCS | Mod: CPTII,S$GLB,, | Performed by: INTERNAL MEDICINE

## 2023-03-30 PROCEDURE — 99999 PR PBB SHADOW E&M-EST. PATIENT-LVL III: ICD-10-PCS | Mod: PBBFAC,,, | Performed by: INTERNAL MEDICINE

## 2023-03-30 PROCEDURE — 3008F PR BODY MASS INDEX (BMI) DOCUMENTED: ICD-10-PCS | Mod: CPTII,S$GLB,, | Performed by: INTERNAL MEDICINE

## 2023-03-30 PROCEDURE — 99999 PR PBB SHADOW E&M-EST. PATIENT-LVL III: CPT | Mod: PBBFAC,,, | Performed by: INTERNAL MEDICINE

## 2023-03-30 PROCEDURE — 3072F LOW RISK FOR RETINOPATHY: CPT | Mod: CPTII,S$GLB,, | Performed by: INTERNAL MEDICINE

## 2023-03-30 PROCEDURE — 99204 PR OFFICE/OUTPT VISIT, NEW, LEVL IV, 45-59 MIN: ICD-10-PCS | Mod: S$GLB,,, | Performed by: INTERNAL MEDICINE

## 2023-03-30 PROCEDURE — 3075F SYST BP GE 130 - 139MM HG: CPT | Mod: CPTII,S$GLB,, | Performed by: INTERNAL MEDICINE

## 2023-03-30 PROCEDURE — 99204 OFFICE O/P NEW MOD 45 MIN: CPT | Mod: S$GLB,,, | Performed by: INTERNAL MEDICINE

## 2023-03-30 NOTE — PROGRESS NOTES
Subjective:   Patient ID:  Krish Ramos is a 42 y.o. male who presents for cardiac consult of No chief complaint on file.    Referring physician: Rohit Del Rio MD   Reason for consult: abnormal ECG    HPI  The patient came in today for cardiac consult of No chief complaint on file.    3/30/23  Krish Ramos is a 42 y.o. male pt with HTN, HLD, DM2, obesity BMI > 40, LOLLY presents for CV eval of abnormal ECG.     Recent ECG with inferior Q waves. Had sleep study - Moderate obstructive sleep apnea suboptimal compliance with CPAP.  Home sleep study detected AHI 90 events per hour.  Possible cardiac dysrhythmia.  Works with [employer]. PT does lift and walks and aqua therapy has knee pain.     Patient feels no PND, no palpitation,  no syncope, no CNS symptoms.    Patient has fairly good exercise tolerance. Works for remotely for [employer]    Patient is compliant with medications.    FH - father - HTN, DM - 75    ECG  Normal sinus rhythm   Inferior infarct ,age undetermined   Abnormal ECG   No previous ECGs available   Confirmed by MANJINDER ALANIZ MD ([id number]) on 3/10/2023 11:45:51 AM     Referred By: ROHIT DEL RIO           Confirmed By:MANJINDER ALANIZ MD    Past Medical History:   Diagnosis Date    Diabetes mellitus     Eczema     HLD (hyperlipidemia)     Primary hypertension        Past Surgical History:   Procedure Laterality Date    FEMUR FRACTURE SURGERY Right        Social History     Tobacco Use    Smoking status: Never    Smokeless tobacco: Never       History reviewed. No pertinent family history.    Patient's Medications   New Prescriptions    No medications on file   Previous Medications    BLOOD SUGAR DIAGNOSTIC STRP    Use with glucometer daily as needed. Please send accu-chek dawit strips.    DUPIXENT SYRINGE 300 MG/2 ML SYRG    Inject 300mg (1 syringe) into the skin every other week.    EPINEPHRINE (EPIPEN) 0.3 MG/0.3 ML ATIN    Inject 0.3 mLs (0.3 mg total) into the  muscle once. for 1 dose    LANCETS (ACCU-CHEK SOFTCLIX LANCETS) MISC    1 each by Misc.(Non-Drug; Combo Route) route 2 (two) times a day.    OLMESARTAN-AMLODIPIN-HCTHIAZID 40-10-25 MG TAB    TAKE 1 TABLET BY MOUTH EVERY DAY    SEMAGLUTIDE (OZEMPIC) 1 MG/DOSE (4 MG/3 ML)    INJECT 1 MG UNDER THE SKIN EVERY 7 DAYS    TACROLIMUS (PROTOPIC) 0.1 % OINTMENT    Apply topically 2 (two) times daily. Non-steroid. Safe to use long-term    TIZANIDINE (ZANAFLEX) 4 MG TABLET    Take 4 mg by mouth nightly.    TRIAMCINOLONE ACETONIDE 0.1% (KENALOG) 0.1 % OINTMENT    AAA bid prn. Do not use on face, underarms or groin.   Modified Medications    No medications on file   Discontinued Medications    ACETAMINOPHEN-CODEINE 300-30MG (TYLENOL #3) 300-30 MG TAB    Take 1 tablet by mouth every 4 to 6 hours as needed.    XOFLUZA 80 MG TABLET    Take 1 tablet by mouth once.       Review of Systems   Constitutional: Negative.    HENT: Negative.     Eyes: Negative.    Respiratory: Negative.     Cardiovascular: Negative.    Gastrointestinal: Negative.    Genitourinary: Negative.    Musculoskeletal: Negative.    Skin: Negative.    Neurological: Negative.    Endo/Heme/Allergies: Negative.    Psychiatric/Behavioral: Negative.     All 12 systems otherwise negative.    Wt Readings from Last 3 Encounters:   03/30/23 (!) 152.4 kg (335 lb 15.7 oz)   03/10/23 (!) 152.9 kg (337 lb)   12/02/22 (!) 159 kg (350 lb 6.7 oz)     Temp Readings from Last 3 Encounters:   09/20/21 98.1 °F (36.7 °C) (Temporal)     BP Readings from Last 3 Encounters:   03/30/23 135/75   03/10/23 112/78   12/02/22 136/84     Pulse Readings from Last 3 Encounters:   03/30/23 95   03/10/23 96   12/02/22 90       /75   Pulse 95   Wt (!) 152.4 kg (335 lb 15.7 oz)   SpO2 98%   BMI 45.57 kg/m²     Objective:   Physical Exam  Vitals and nursing note reviewed.   Constitutional:       General: He is not in acute distress.     Appearance: He is well-developed. He is obese. He is not  diaphoretic.   HENT:      Head: Normocephalic and atraumatic.      Nose: Nose normal.   Eyes:      General: No scleral icterus.     Conjunctiva/sclera: Conjunctivae normal.   Neck:      Thyroid: No thyromegaly.      Vascular: No JVD.   Cardiovascular:      Rate and Rhythm: Normal rate and regular rhythm.      Heart sounds: S1 normal and S2 normal. No murmur heard.    No friction rub. No gallop. No S3 or S4 sounds.   Pulmonary:      Effort: Pulmonary effort is normal. No respiratory distress.      Breath sounds: Normal breath sounds. No stridor. No wheezing or rales.   Chest:      Chest wall: No tenderness.   Abdominal:      General: Bowel sounds are normal. There is no distension.      Palpations: Abdomen is soft. There is no mass.      Tenderness: There is no abdominal tenderness. There is no rebound.   Genitourinary:     Comments: Deferred  Musculoskeletal:         General: No tenderness or deformity. Normal range of motion.      Cervical back: Normal range of motion and neck supple.   Lymphadenopathy:      Cervical: No cervical adenopathy.   Skin:     General: Skin is warm and dry.      Coloration: Skin is not pale.      Findings: No erythema or rash.   Neurological:      Mental Status: He is alert and oriented to person, place, and time.      Motor: No abnormal muscle tone.      Coordination: Coordination normal.   Psychiatric:         Behavior: Behavior normal.         Thought Content: Thought content normal.         Judgment: Judgment normal.       Lab Results   Component Value Date     09/06/2022    K 3.9 09/06/2022     09/06/2022    CO2 24 09/06/2022    BUN 8 09/06/2022    CREATININE 0.8 09/06/2022    GLU 90 09/06/2022    HGBA1C 7.2 (H) 11/17/2022    AST 23 09/06/2022    ALT 23 09/06/2022    ALBUMIN 4.0 09/06/2022    PROT 8.3 09/06/2022    BILITOT 0.5 09/06/2022    WBC 6.38 09/06/2022    HGB 14.0 09/06/2022    HCT 42.5 09/06/2022    MCV 92 09/06/2022     09/06/2022    CHOL 225 (H)  11/17/2022    HDL 50 11/17/2022    LDLCALC 151.8 11/17/2022    TRIG 116 11/17/2022     Assessment:      1. Nonspecific abnormal electrocardiogram (ECG) (EKG)    2. Other hyperlipidemia    3. Primary hypertension    4. LOLLY (obstructive sleep apnea)    5. Type 2 diabetes mellitus with other specified complication, unspecified whether long term insulin use    6. Severe obesity (BMI >= 40)        Plan:     Abnormal ECG - inferior Q waves  - order ECG stress test and ECHO  - will discuss if further testing needed    2. HTN  - titrate meds    3. HLD  - needs improvement   - not on meds now - had recent labs    4. LOLLY, moderate  - cont CPAP    5. DM2 A1c 7.2  - cont tx, needs improvement     6. Obesity, BMI 45  - cont weight loss     Thank you for allowing me to participate in this patient's care. Please do not hesitate to contact me with any questions or concerns. Consult note has been forwarded to the referral physician.

## 2023-04-19 ENCOUNTER — PATIENT MESSAGE (OUTPATIENT)
Dept: ADMINISTRATIVE | Facility: HOSPITAL | Age: 43
End: 2023-04-19
Payer: COMMERCIAL

## 2023-04-21 ENCOUNTER — HOSPITAL ENCOUNTER (OUTPATIENT)
Dept: CARDIOLOGY | Facility: HOSPITAL | Age: 43
Discharge: HOME | End: 2023-04-21
Attending: INTERNAL MEDICINE
Payer: COMMERCIAL

## 2023-04-21 VITALS
WEIGHT: 315 LBS | SYSTOLIC BLOOD PRESSURE: 135 MMHG | DIASTOLIC BLOOD PRESSURE: 75 MMHG | HEIGHT: 72 IN | BODY MASS INDEX: 42.66 KG/M2

## 2023-04-21 DIAGNOSIS — R94.31 NONSPECIFIC ABNORMAL ELECTROCARDIOGRAM (ECG) (EKG): ICD-10-CM

## 2023-04-21 DIAGNOSIS — I10 PRIMARY HYPERTENSION: ICD-10-CM

## 2023-04-21 DIAGNOSIS — E66.01 SEVERE OBESITY (BMI >= 40): ICD-10-CM

## 2023-04-21 DIAGNOSIS — E11.69 TYPE 2 DIABETES MELLITUS WITH OTHER SPECIFIED COMPLICATION, UNSPECIFIED WHETHER LONG TERM INSULIN USE: ICD-10-CM

## 2023-04-21 DIAGNOSIS — G47.33 OSA (OBSTRUCTIVE SLEEP APNEA): ICD-10-CM

## 2023-04-21 DIAGNOSIS — E78.49 OTHER HYPERLIPIDEMIA: ICD-10-CM

## 2023-04-21 LAB
AORTIC ROOT ANNULUS: 3.68 CM
AV INDEX (PROSTH): 0.87
AV MEAN GRADIENT: 4 MMHG
AV PEAK GRADIENT: 7 MMHG
AV VALVE AREA: 2.75 CM2
AV VELOCITY RATIO: 0.78
BSA FOR ECHO PROCEDURE: 2.78 M2
CV ECHO LV RWT: 0.47 CM
CV STRESS BASE HR: 111 BPM
DIASTOLIC BLOOD PRESSURE: 82 MMHG
DOP CALC AO PEAK VEL: 1.36 M/S
DOP CALC AO VTI: 19.7 CM
DOP CALC LVOT AREA: 3.2 CM2
DOP CALC LVOT DIAMETER: 2.01 CM
DOP CALC LVOT PEAK VEL: 1.06 M/S
DOP CALC LVOT STROKE VOLUME: 54.23 CM3
DOP CALC RVOT PEAK VEL: 1.12 M/S
DOP CALC RVOT VTI: 18.5 CM
DOP CALCLVOT PEAK VEL VTI: 17.1 CM
E WAVE DECELERATION TIME: 125.82 MSEC
E/A RATIO: 0.69
E/E' RATIO: 4.86 M/S
ECHO LV POSTERIOR WALL: 1.17 CM (ref 0.6–1.1)
EJECTION FRACTION: 65 %
FRACTIONAL SHORTENING: 33 % (ref 28–44)
INTERVENTRICULAR SEPTUM: 1.29 CM (ref 0.6–1.1)
IVRT: 91.34 MSEC
LA MAJOR: 4.77 CM
LA MINOR: 4.19 CM
LA WIDTH: 2.8 CM
LEFT ATRIUM SIZE: 3.72 CM
LEFT ATRIUM VOLUME INDEX MOD: 15.4 ML/M2
LEFT ATRIUM VOLUME INDEX: 14.9 ML/M2
LEFT ATRIUM VOLUME MOD: 40.85 CM3
LEFT ATRIUM VOLUME: 39.5 CM3
LEFT INTERNAL DIMENSION IN SYSTOLE: 3.35 CM (ref 2.1–4)
LEFT VENTRICLE DIASTOLIC VOLUME INDEX: 45.13 ML/M2
LEFT VENTRICLE DIASTOLIC VOLUME: 119.6 ML
LEFT VENTRICLE MASS INDEX: 92 G/M2
LEFT VENTRICLE SYSTOLIC VOLUME INDEX: 17.3 ML/M2
LEFT VENTRICLE SYSTOLIC VOLUME: 45.84 ML
LEFT VENTRICULAR INTERNAL DIMENSION IN DIASTOLE: 5.02 CM (ref 3.5–6)
LEFT VENTRICULAR MASS: 243.55 G
LV LATERAL E/E' RATIO: 4.86 M/S
LV SEPTAL E/E' RATIO: 4.86 M/S
LVOT MG: 2.94 MMHG
LVOT MV: 0.84 CM/S
MV PEAK A VEL: 0.99 M/S
MV PEAK E VEL: 0.68 M/S
MV STENOSIS PRESSURE HALF TIME: 36.49 MS
MV VALVE AREA P 1/2 METHOD: 6.03 CM2
OHS CV CPX 1 MINUTE RECOVERY HEART RATE: 162 BPM
OHS CV CPX 85 PERCENT MAX PREDICTED HEART RATE MALE: 152
OHS CV CPX ESTIMATED METS: 7
OHS CV CPX MAX PREDICTED HEART RATE: 179
OHS CV CPX PATIENT IS FEMALE: 0
OHS CV CPX PATIENT IS MALE: 1
OHS CV CPX PEAK DIASTOLIC BLOOD PRESSURE: 73 MMHG
OHS CV CPX PEAK HEAR RATE: 171 BPM
OHS CV CPX PEAK RATE PRESSURE PRODUCT: NORMAL
OHS CV CPX PEAK SYSTOLIC BLOOD PRESSURE: 185 MMHG
OHS CV CPX PERCENT MAX PREDICTED HEART RATE ACHIEVED: 96
OHS CV CPX RATE PRESSURE PRODUCT PRESENTING: NORMAL
PISA TR MAX VEL: 2.09 M/S
PV MEAN GRADIENT: 2.19 MMHG
PV MV: 0.82 M/S
PV PEAK VELOCITY: 1.31 CM/S
RA MAJOR: 4.18 CM
RA PRESSURE: 8 MMHG
RA WIDTH: 2.73 CM
RIGHT VENTRICULAR END-DIASTOLIC DIMENSION: 2.38 CM
SINUS: 2.98 CM
STJ: 2.56 CM
STRESS ECHO POST EXERCISE DUR MIN: 6 MINUTES
STRESS ECHO POST EXERCISE DUR SEC: 0 SECONDS
SYSTOLIC BLOOD PRESSURE: 131 MMHG
TDI LATERAL: 0.14 M/S
TDI SEPTAL: 0.14 M/S
TDI: 0.14 M/S
TR MAX PG: 17 MMHG
TRICUSPID ANNULAR PLANE SYSTOLIC EXCURSION: 2.66 CM
TV REST PULMONARY ARTERY PRESSURE: 25 MMHG

## 2023-04-21 PROCEDURE — 93306 ECHO (CUPID ONLY): ICD-10-PCS | Mod: 26,,, | Performed by: INTERNAL MEDICINE

## 2023-04-21 PROCEDURE — 93018 CV STRESS TEST I&R ONLY: CPT | Mod: ,,, | Performed by: INTERNAL MEDICINE

## 2023-04-21 PROCEDURE — 93306 TTE W/DOPPLER COMPLETE: CPT | Mod: 26,,, | Performed by: INTERNAL MEDICINE

## 2023-04-21 PROCEDURE — 93306 TTE W/DOPPLER COMPLETE: CPT

## 2023-04-21 PROCEDURE — 93017 CV STRESS TEST TRACING ONLY: CPT

## 2023-04-21 PROCEDURE — 93018 EXERCISE STRESS - EKG (CUPID ONLY): ICD-10-PCS | Mod: ,,, | Performed by: INTERNAL MEDICINE

## 2023-04-21 PROCEDURE — 93016 EXERCISE STRESS - EKG (CUPID ONLY): ICD-10-PCS | Mod: ,,, | Performed by: INTERNAL MEDICINE

## 2023-04-21 PROCEDURE — 93016 CV STRESS TEST SUPVJ ONLY: CPT | Mod: ,,, | Performed by: INTERNAL MEDICINE

## 2023-04-27 ENCOUNTER — PATIENT MESSAGE (OUTPATIENT)
Dept: CARDIOLOGY | Facility: CLINIC | Age: 43
End: 2023-04-27
Payer: COMMERCIAL

## 2023-06-07 DIAGNOSIS — E11.9 TYPE 2 DIABETES MELLITUS WITHOUT COMPLICATION: ICD-10-CM

## 2023-06-10 RX ORDER — SEMAGLUTIDE 1.34 MG/ML
INJECTION, SOLUTION SUBCUTANEOUS
Qty: 3 ML | Refills: 1 | Status: SHIPPED | OUTPATIENT
Start: 2023-06-10 | End: 2023-08-24 | Stop reason: SDUPTHER

## 2023-06-10 NOTE — TELEPHONE ENCOUNTER
No care due was identified.  [facility] Embedded Care Due Messages. Reference number: [id number].   6/09/2023 10:22:09 PM CDT

## 2023-06-10 NOTE — TELEPHONE ENCOUNTER
Refill Routing Note   Medication(s) are not appropriate for processing by [facility] for the following reason(s):      Required labs outdated    [facility] action(s):  Defer None identified            Appointments  past 12m or future 3m with PCP    Date Provider   Last Visit   8/11/2022 Caro Pham MD   Next Visit   Visit date not found Caro Pham MD   ED visits in past 90 days: 0        Note composed:1:30 AM 06/10/2023

## 2023-06-19 ENCOUNTER — OFFICE VISIT (OUTPATIENT)
Dept: INTERNAL MEDICINE | Facility: CLINIC | Age: 43
End: 2023-06-19
Payer: COMMERCIAL

## 2023-06-19 DIAGNOSIS — Z72.51 HIGH RISK SEXUAL BEHAVIOR, UNSPECIFIED TYPE: Primary | ICD-10-CM

## 2023-06-19 PROCEDURE — 99214 OFFICE O/P EST MOD 30 MIN: CPT | Mod: 95,,, | Performed by: NURSE PRACTITIONER

## 2023-06-19 PROCEDURE — 1159F PR MEDICATION LIST DOCUMENTED IN MEDICAL RECORD: ICD-10-PCS | Mod: CPTII,95,, | Performed by: NURSE PRACTITIONER

## 2023-06-19 PROCEDURE — 1159F MED LIST DOCD IN RCRD: CPT | Mod: CPTII,95,, | Performed by: NURSE PRACTITIONER

## 2023-06-19 PROCEDURE — 99214 PR OFFICE/OUTPT VISIT, EST, LEVL IV, 30-39 MIN: ICD-10-PCS | Mod: 95,,, | Performed by: NURSE PRACTITIONER

## 2023-06-19 PROCEDURE — 1160F RVW MEDS BY RX/DR IN RCRD: CPT | Mod: CPTII,95,, | Performed by: NURSE PRACTITIONER

## 2023-06-19 PROCEDURE — 3072F LOW RISK FOR RETINOPATHY: CPT | Mod: CPTII,95,, | Performed by: NURSE PRACTITIONER

## 2023-06-19 PROCEDURE — 1160F PR REVIEW ALL MEDS BY PRESCRIBER/CLIN PHARMACIST DOCUMENTED: ICD-10-PCS | Mod: CPTII,95,, | Performed by: NURSE PRACTITIONER

## 2023-06-19 PROCEDURE — 3072F PR LOW RISK FOR RETINOPATHY: ICD-10-PCS | Mod: CPTII,95,, | Performed by: NURSE PRACTITIONER

## 2023-06-19 NOTE — PROGRESS NOTES
Krish Ramos  06/19/2023  [id number]      The patient location is: home   The chief complaint leading to consultation is: STD wellness  Visit type: Virtual visit with synchronous audio and video  Total time spent with patient: 5 min  Each patient to whom he or she provides medical services by telemedicine is:  (1) informed of the relationship between the physician and patient and the respective role of any other health care provider with respect to management of the patient; and (2) notified that he or she may decline to receive medical services by telemedicine and may withdraw from such care at any time.        Chief Complaint:      History of Present Illness:    43 male is requesting full STD panel.  Patient reports that he isn't a new committed relationship and would like to have a complete STD panel completed.  Denies dysuria, hematuria, discharge, lower abdominal pain.    Review of Systems   HENT:  Negative for hearing loss.    Eyes:  Negative for discharge.   Respiratory:  Negative for wheezing.    Cardiovascular:  Negative for chest pain and palpitations.   Gastrointestinal:  Negative for blood in stool, constipation, diarrhea and vomiting.   Genitourinary:  Negative for hematuria and urgency.   Musculoskeletal:  Negative for neck pain.   Neurological:  Negative for weakness and headaches.   Endo/Heme/Allergies:  Negative for polydipsia.       History:  Past Medical History:   Diagnosis Date    Diabetes mellitus     Eczema     HLD (hyperlipidemia)     Primary hypertension      Past Surgical History:   Procedure Laterality Date    FEMUR FRACTURE SURGERY Right        No family history on file.  Social History     Socioeconomic History    Marital status: Single   Tobacco Use    Smoking status: Never    Smokeless tobacco: Never     Patient Active Problem List   Diagnosis    Sleep apnea    LOLLY (obstructive sleep apnea)    HLD (hyperlipidemia)    Primary hypertension    Diabetes mellitus     Review of patient's  allergies indicates:   Allergen Reactions    Fish containing products Itching, Nausea Only and Swelling    Lisinopril Shortness Of Breath    Hydrocodone-acetaminophen Nausea Only       The following were reviewed at this visit: active problem list, medication list, allergies, family history, social history, and health maintenance.    Medications:  Current Outpatient Medications on File Prior to Visit   Medication Sig Dispense Refill    OZEMPIC 1 mg/dose (4 mg/3 mL) INJECT 1 MG UNDER THE SKIN EVERY 7 DAYS 3 mL 1    blood sugar diagnostic Strp Use with glucometer daily as needed. Please send accu-chek dawit strips. 100 strip 2    DUPIXENT SYRINGE 300 mg/2 mL Syrg Inject 300mg (1 syringe) into the skin every other week. 4 mL 5    EPINEPHrine (EPIPEN) 0.3 mg/0.3 mL AtIn Inject 0.3 mLs (0.3 mg total) into the muscle once. for 1 dose 2 Device 3    lancets (ACCU-CHEK SOFTCLIX LANCETS) Misc 1 each by Misc.(Non-Drug; Combo Route) route 2 (two) times a day. 100 each 3    olmesartan-amLODIPin-hcthiazid 40-10-25 mg Tab TAKE 1 TABLET BY MOUTH EVERY DAY 90 tablet 0    tacrolimus (PROTOPIC) 0.1 % ointment Apply topically 2 (two) times daily. Non-steroid. Safe to use long-term 60 g 3    tiZANidine (ZANAFLEX) 4 MG tablet Take 4 mg by mouth nightly.      triamcinolone acetonide 0.1% (KENALOG) 0.1 % ointment AAA bid prn. Do not use on face, underarms or groin. 80 g 3     No current facility-administered medications on file prior to visit.       Exam:  Wt Readings from Last 3 Encounters:   04/21/23 (!) 152 kg (335 lb)   03/30/23 (!) 152.4 kg (335 lb 15.7 oz)   03/10/23 (!) 152.9 kg (337 lb)     Temp Readings from Last 3 Encounters:   09/20/21 98.1 °F (36.7 °C) (Temporal)     BP Readings from Last 3 Encounters:   04/21/23 135/75   03/30/23 135/75   03/10/23 112/78     Pulse Readings from Last 3 Encounters:   03/30/23 95   03/10/23 96   12/02/22 90     There is no height or weight on file to calculate BMI.      @ROS@    Physical  Exam  Vitals and nursing note reviewed.   Constitutional:       Appearance: Normal appearance.   Eyes:      Conjunctiva/sclera: Conjunctivae normal.   Pulmonary:      Effort: Pulmonary effort is normal.   Neurological:      General: No focal deficit present.      Mental Status: He is alert and oriented to person, place, and time.   Psychiatric:         Mood and Affect: Mood normal.         Behavior: Behavior normal.         Thought Content: Thought content normal.         Judgment: Judgment normal.       Laboratory Reviewed ({Yes)  Lab Results   Component Value Date    WBC 6.38 09/06/2022    HGB 14.0 09/06/2022    HCT 42.5 09/06/2022     09/06/2022    CHOL 225 (H) 11/17/2022    TRIG 116 11/17/2022    HDL 50 11/17/2022    ALT 23 09/06/2022    AST 23 09/06/2022     09/06/2022    K 3.9 09/06/2022     09/06/2022    CREATININE 0.8 09/06/2022    BUN 8 09/06/2022    CO2 24 09/06/2022    HGBA1C 7.2 (H) 11/17/2022   Diagnoses and all orders for this visit:    High risk sexual behavior, unspecified type  -     RPR; Future  -     HIV 1/2 Ag/Ab (4th Gen); Future  -     C. trachomatis/N. gonorrhoeae by AMP DNA  -     Trichomonas vaginalis, RNA, Qual, Urine  -     Hepatitis C Antibody; Future         There are no diagnoses linked to this encounter.

## 2023-06-21 ENCOUNTER — LAB VISIT (OUTPATIENT)
Dept: LAB | Facility: HOSPITAL | Age: 43
End: 2023-06-21
Attending: NURSE PRACTITIONER
Payer: COMMERCIAL

## 2023-06-21 ENCOUNTER — PATIENT MESSAGE (OUTPATIENT)
Dept: DERMATOLOGY | Facility: CLINIC | Age: 43
End: 2023-06-21
Payer: COMMERCIAL

## 2023-06-21 DIAGNOSIS — Z72.51 HIGH RISK SEXUAL BEHAVIOR, UNSPECIFIED TYPE: ICD-10-CM

## 2023-06-21 LAB
HCV AB SERPL QL IA: NORMAL
HIV 1+2 AB+HIV1 P24 AG SERPL QL IA: NORMAL

## 2023-06-21 PROCEDURE — 87389 HIV-1 AG W/HIV-1&-2 AB AG IA: CPT | Performed by: NURSE PRACTITIONER

## 2023-06-21 PROCEDURE — 86592 SYPHILIS TEST NON-TREP QUAL: CPT | Performed by: NURSE PRACTITIONER

## 2023-06-21 PROCEDURE — 36415 COLL VENOUS BLD VENIPUNCTURE: CPT | Performed by: NURSE PRACTITIONER

## 2023-06-21 PROCEDURE — 86803 HEPATITIS C AB TEST: CPT | Performed by: NURSE PRACTITIONER

## 2023-06-22 LAB — RPR SER QL: NORMAL

## 2023-07-04 ENCOUNTER — PATIENT MESSAGE (OUTPATIENT)
Dept: DERMATOLOGY | Facility: CLINIC | Age: 43
End: 2023-07-04
Payer: COMMERCIAL

## 2023-07-07 ENCOUNTER — PATIENT MESSAGE (OUTPATIENT)
Dept: INFECTIOUS DISEASES | Facility: CLINIC | Age: 43
End: 2023-07-07
Payer: COMMERCIAL

## 2023-08-22 ENCOUNTER — PATIENT MESSAGE (OUTPATIENT)
Dept: FAMILY MEDICINE | Facility: CLINIC | Age: 43
End: 2023-08-22
Payer: COMMERCIAL

## 2023-08-24 RX ORDER — SEMAGLUTIDE 1.34 MG/ML
1 INJECTION, SOLUTION SUBCUTANEOUS
Qty: 3 ML | Refills: 1 | Status: SHIPPED | OUTPATIENT
Start: 2023-08-24 | End: 2023-11-13 | Stop reason: SDUPTHER

## 2023-09-08 DIAGNOSIS — I49.9 CARDIAC ARRHYTHMIA, UNSPECIFIED CARDIAC ARRHYTHMIA TYPE: Primary | ICD-10-CM

## 2023-09-15 ENCOUNTER — OFFICE VISIT (OUTPATIENT)
Dept: PULMONOLOGY | Facility: CLINIC | Age: 43
End: 2023-09-15
Payer: COMMERCIAL

## 2023-09-15 VITALS
BODY MASS INDEX: 42.66 KG/M2 | HEIGHT: 72 IN | RESPIRATION RATE: 18 BRPM | WEIGHT: 315 LBS | DIASTOLIC BLOOD PRESSURE: 78 MMHG | HEART RATE: 91 BPM | SYSTOLIC BLOOD PRESSURE: 132 MMHG | OXYGEN SATURATION: 97 %

## 2023-09-15 DIAGNOSIS — I10 PRIMARY HYPERTENSION: ICD-10-CM

## 2023-09-15 DIAGNOSIS — E66.01 MORBID OBESITY: ICD-10-CM

## 2023-09-15 DIAGNOSIS — G47.33 OSA (OBSTRUCTIVE SLEEP APNEA): Primary | ICD-10-CM

## 2023-09-15 PROCEDURE — 3078F DIAST BP <80 MM HG: CPT | Mod: CPTII,S$GLB,, | Performed by: PHYSICIAN ASSISTANT

## 2023-09-15 PROCEDURE — 99999 PR PBB SHADOW E&M-EST. PATIENT-LVL IV: CPT | Mod: PBBFAC,,, | Performed by: PHYSICIAN ASSISTANT

## 2023-09-15 PROCEDURE — 1160F PR REVIEW ALL MEDS BY PRESCRIBER/CLIN PHARMACIST DOCUMENTED: ICD-10-PCS | Mod: CPTII,S$GLB,, | Performed by: PHYSICIAN ASSISTANT

## 2023-09-15 PROCEDURE — 3075F SYST BP GE 130 - 139MM HG: CPT | Mod: CPTII,S$GLB,, | Performed by: PHYSICIAN ASSISTANT

## 2023-09-15 PROCEDURE — 3072F PR LOW RISK FOR RETINOPATHY: ICD-10-PCS | Mod: CPTII,S$GLB,, | Performed by: PHYSICIAN ASSISTANT

## 2023-09-15 PROCEDURE — 3072F LOW RISK FOR RETINOPATHY: CPT | Mod: CPTII,S$GLB,, | Performed by: PHYSICIAN ASSISTANT

## 2023-09-15 PROCEDURE — 3078F PR MOST RECENT DIASTOLIC BLOOD PRESSURE < 80 MM HG: ICD-10-PCS | Mod: CPTII,S$GLB,, | Performed by: PHYSICIAN ASSISTANT

## 2023-09-15 PROCEDURE — 1160F RVW MEDS BY RX/DR IN RCRD: CPT | Mod: CPTII,S$GLB,, | Performed by: PHYSICIAN ASSISTANT

## 2023-09-15 PROCEDURE — 1159F PR MEDICATION LIST DOCUMENTED IN MEDICAL RECORD: ICD-10-PCS | Mod: CPTII,S$GLB,, | Performed by: PHYSICIAN ASSISTANT

## 2023-09-15 PROCEDURE — 99214 PR OFFICE/OUTPT VISIT, EST, LEVL IV, 30-39 MIN: ICD-10-PCS | Mod: S$GLB,,, | Performed by: PHYSICIAN ASSISTANT

## 2023-09-15 PROCEDURE — 3008F PR BODY MASS INDEX (BMI) DOCUMENTED: ICD-10-PCS | Mod: CPTII,S$GLB,, | Performed by: PHYSICIAN ASSISTANT

## 2023-09-15 PROCEDURE — 99999 PR PBB SHADOW E&M-EST. PATIENT-LVL IV: ICD-10-PCS | Mod: PBBFAC,,, | Performed by: PHYSICIAN ASSISTANT

## 2023-09-15 PROCEDURE — 99214 OFFICE O/P EST MOD 30 MIN: CPT | Mod: S$GLB,,, | Performed by: PHYSICIAN ASSISTANT

## 2023-09-15 PROCEDURE — 3075F PR MOST RECENT SYSTOLIC BLOOD PRESS GE 130-139MM HG: ICD-10-PCS | Mod: CPTII,S$GLB,, | Performed by: PHYSICIAN ASSISTANT

## 2023-09-15 PROCEDURE — 1159F MED LIST DOCD IN RCRD: CPT | Mod: CPTII,S$GLB,, | Performed by: PHYSICIAN ASSISTANT

## 2023-09-15 PROCEDURE — 3008F BODY MASS INDEX DOCD: CPT | Mod: CPTII,S$GLB,, | Performed by: PHYSICIAN ASSISTANT

## 2023-09-15 NOTE — ASSESSMENT & PLAN NOTE
Compliant with CPAP and benefits from use  New supply order today, nasal mask  Discussed therapeutic goals for CPAP: Ideal usage 100% of nights for 6-8 hours per night. Minimum usage is 70% of night for at least 4 hours per night.

## 2023-09-15 NOTE — PROGRESS NOTES
Subjective:       Patient ID: Krish Ramos is a 43 y.o. male.    Chief Complaint: Sleep Apnea    9/15/23  Here for LOLLY on CPAP follow up  Using AutoPAP, nasal mask  Compliance download reviewed, days with usage > 4 hours is 87%, average AHI is 1.9  Patient states improved symptoms with use of CPAP. Sleeping more soundly. Waking up feeling more refreshed. Improved daytime sleepiness.      BP Readings from Last 3 Encounters:   09/15/23 132/78   04/21/23 135/75   03/30/23 135/75         9/15/2023     9:03 AM   EPWORTH SLEEPINESS SCALE   Sitting and reading 0   Watching TV 0   Sitting, inactive in a public place (e.g. a theatre or a meeting) 0   As a passenger in a car for an hour without a break 0   Lying down to rest in the afternoon when circumstances permit 0   Sitting and talking to someone 0   Sitting quietly after a lunch without alcohol 0   In a car, while stopped for a few minutes in traffic 0   Total score 0         Immunization History   Administered Date(s) Administered    COVID-19, MRNA, LN-S, PF (Pfizer) (Purple Cap) 03/20/2021, 04/10/2021, 11/03/2021    Influenza 03/03/2022    Influenza - Quadrivalent - PF *Preferred* (6 months and older) 12/02/2022    Pneumococcal Conjugate - 20 Valent 03/10/2023      Tobacco Use: Low Risk  (9/15/2023)    Patient History     Smoking Tobacco Use: Never     Smokeless Tobacco Use: Never     Passive Exposure: Not on file      Past Medical History:   Diagnosis Date    Diabetes mellitus     Eczema     HLD (hyperlipidemia)     Primary hypertension       Current Outpatient Medications on File Prior to Visit   Medication Sig Dispense Refill    blood sugar diagnostic Strp Use with glucometer daily as needed. Please send accu-chek dawit strips. 100 strip 2    DUPIXENT SYRINGE 300 mg/2 mL Syrg Inject 300mg (1 syringe) into the skin every other week. 4 mL 5    lancets (ACCU-CHEK SOFTCLIX LANCETS) Misc 1 each by Misc.(Non-Drug; Combo Route) route 2 (two) times a day. 100 each 3     olmesartan-amLODIPin-hcthiazid 40-10-25 mg Tab TAKE 1 TABLET BY MOUTH EVERY DAY 90 tablet 0    semaglutide (OZEMPIC) 1 mg/dose (4 mg/3 mL) Inject 1 mg into the skin every 7 days. 3 mL 1    tacrolimus (PROTOPIC) 0.1 % ointment Apply topically 2 (two) times daily. Non-steroid. Safe to use long-term 60 g 3    tiZANidine (ZANAFLEX) 4 MG tablet Take 4 mg by mouth nightly.      triamcinolone acetonide 0.1% (KENALOG) 0.1 % ointment AAA bid prn. Do not use on face, underarms or groin. 80 g 3    EPINEPHrine (EPIPEN) 0.3 mg/0.3 mL AtIn Inject 0.3 mLs (0.3 mg total) into the muscle once. for 1 dose 2 Device 3     No current facility-administered medications on file prior to visit.        Review of Systems   Constitutional:  Negative for fever, weight loss, appetite change and weakness.   HENT:  Negative for postnasal drip, rhinorrhea, sinus pressure, trouble swallowing and congestion.    Respiratory:  Negative for cough, sputum production, choking, chest tightness, shortness of breath, wheezing and dyspnea on extertion.    Cardiovascular:  Negative for chest pain and leg swelling.   Musculoskeletal:  Negative for gait problem and joint swelling.   Gastrointestinal:  Negative for nausea, vomiting and abdominal pain.   Neurological:  Negative for dizziness, weakness and headaches.   All other systems reviewed and are negative.      Objective:       Vitals:    09/15/23 0904   BP: 132/78   Pulse: 91   Resp: 18   SpO2: 97%   Weight: (!) 147.7 kg (325 lb 9.9 oz)   Height: 6' (1.829 m)       Physical Exam   Constitutional: He is oriented to person, place, and time. He appears well-developed and well-nourished. No distress. He is obese.   HENT:   Head: Normocephalic.   Nose: Nose normal.   Mouth/Throat: Oropharynx is clear and moist.   Cardiovascular: Normal rate and regular rhythm.   Pulmonary/Chest: Effort normal. No respiratory distress. He has no wheezes. He has no rhonchi. He has no rales.   Musculoskeletal:         General: No  edema.      Cervical back: Normal range of motion and neck supple.   Neurological: He is alert and oriented to person, place, and time. Gait normal.   Skin: Skin is warm and dry.   Psychiatric: He has a normal mood and affect.   Vitals reviewed.    Personal Diagnostic Review    Exercise Stress - EKG    The ECG portion of the study is negative for ischemia.    The patient reported no chest pain during the stress test.    The blood pressure response to stress was normal.    The exercise capacity was above average.    The patient exercised for 6 minutes 0 seconds on a Felipe protocol,   corresponding to a functional capacity of 7 METS, achieving a peak heart   rate of 171 bpm, which is 96 % of the age predicted maximum heart rate.   Their exercise capacity was above average.  Echo  · The left ventricle is normal in size with mild concentric hypertrophy   and normal systolic function.  · The estimated ejection fraction is 65%.  · Normal left ventricular diastolic function.  · Normal right ventricular size with normal right ventricular systolic   function.  · Mild tricuspid regurgitation.  · Intermediate central venous pressure (8 mmHg).  · The estimated PA systolic pressure is 25 mmHg.       Compliance Report  Compliance  Payor Standard  Usage 07/16/2023 - 09/13/2023  Usage days 60/60 days (100%)  >= 4 hours 52 days (87%)  < 4 hours 8 days (13%)  Usage hours 343 hours 51 minutes  Average usage (total days) 5 hours 44 minutes  Average usage (days used) 5 hours 44 minutes  Median usage (days used) 6 hours 12 minutes  Total used hours (value since last reset - 09/13/2023) 1,078 hours  AirSense 11 AutoSet  Serial number [id number]  Mode AutoSet  Min Pressure 5 cmH2O  Max Pressure 20 cmH2O  EPR Fulltime  EPR level 3  Response Standard  Therapy  Pressure - cmH2O Median: 9.9 95th percentile: 12.6 Maximum: 13.9  Leaks - L/min Median: 8.3 95th percentile: 33.3 Maximum: 48.6  Events per hour AI: 1.3 HI: 0.6 AHI: 1.9  Apnea Index  Central: 0.2 Obstructive: 0.7 Unknown: 0.4  RERA Index 0.4          9/15/2023     9:04 AM 4/21/2023     2:54 PM 3/30/2023     9:04 AM 3/10/2023     8:54 AM 12/2/2022     8:04 AM 9/20/2021     1:35 PM   Pulmonary Function Tests   SpO2 97 %  98 % 97 % 97 %    Height 6' (1.829 m) 6' (1.829 m)  6' (1.829 m) 6' (1.829 m) 6' (1.829 m)   Weight 147.7 kg (325 lb 9.9 oz) 152 kg (335 lb) 152.4 kg (335 lb 15.7 oz) 152.9 kg (337 lb) 159 kg (350 lb 6.7 oz) 164.1 kg (361 lb 12.4 oz)   BMI (Calculated) 44.2 45.4  45.7 47.5 49.1         Assessment/Plan:       Problem List Items Addressed This Visit          Cardiac/Vascular    Primary hypertension     Stable, continue current medication management             Endocrine    Morbid obesity     Weight loss and exercise to improve overall health.              Other    LOLLY (obstructive sleep apnea) - Primary     Compliant with CPAP and benefits from use  New supply order today, nasal mask  Discussed therapeutic goals for CPAP: Ideal usage 100% of nights for 6-8 hours per night. Minimum usage is 70% of night for at least 4 hours per night.           Relevant Orders    CPAP/BIPAP SUPPLIES       Follow up in about 1 year (around 9/15/2024) for LOLLY follow up.    Discussed diagnosis, its evaluation, treatment and usual course. All questions answered.    Patient verbalized understanding of plan and left in no acute distress    Thank you for the courtesy of participating in the care of this patient    Jesenia Perez PA-C  [facility]

## 2023-09-28 ENCOUNTER — PATIENT MESSAGE (OUTPATIENT)
Dept: DERMATOLOGY | Facility: CLINIC | Age: 43
End: 2023-09-28
Payer: COMMERCIAL

## 2023-10-09 ENCOUNTER — TELEPHONE (OUTPATIENT)
Dept: DERMATOLOGY | Facility: CLINIC | Age: 43
End: 2023-10-09

## 2023-10-09 ENCOUNTER — OFFICE VISIT (OUTPATIENT)
Dept: DERMATOLOGY | Facility: CLINIC | Age: 43
End: 2023-10-09
Payer: COMMERCIAL

## 2023-10-09 DIAGNOSIS — L20.89 OTHER ATOPIC DERMATITIS: Primary | ICD-10-CM

## 2023-10-09 DIAGNOSIS — Z79.899 ENCOUNTER FOR LONG-TERM (CURRENT) USE OF MEDICATIONS: ICD-10-CM

## 2023-10-09 PROCEDURE — 99214 PR OFFICE/OUTPT VISIT, EST, LEVL IV, 30-39 MIN: ICD-10-PCS | Mod: 95,,, | Performed by: DERMATOLOGY

## 2023-10-09 PROCEDURE — 1160F RVW MEDS BY RX/DR IN RCRD: CPT | Mod: CPTII,95,, | Performed by: DERMATOLOGY

## 2023-10-09 PROCEDURE — 1159F MED LIST DOCD IN RCRD: CPT | Mod: CPTII,95,, | Performed by: DERMATOLOGY

## 2023-10-09 PROCEDURE — 1159F PR MEDICATION LIST DOCUMENTED IN MEDICAL RECORD: ICD-10-PCS | Mod: CPTII,95,, | Performed by: DERMATOLOGY

## 2023-10-09 PROCEDURE — 3072F PR LOW RISK FOR RETINOPATHY: ICD-10-PCS | Mod: CPTII,95,, | Performed by: DERMATOLOGY

## 2023-10-09 PROCEDURE — 3072F LOW RISK FOR RETINOPATHY: CPT | Mod: CPTII,95,, | Performed by: DERMATOLOGY

## 2023-10-09 PROCEDURE — 1160F PR REVIEW ALL MEDS BY PRESCRIBER/CLIN PHARMACIST DOCUMENTED: ICD-10-PCS | Mod: CPTII,95,, | Performed by: DERMATOLOGY

## 2023-10-09 PROCEDURE — 99214 OFFICE O/P EST MOD 30 MIN: CPT | Mod: 95,,, | Performed by: DERMATOLOGY

## 2023-10-09 RX ORDER — TACROLIMUS 1 MG/G
OINTMENT TOPICAL 2 TIMES DAILY
Qty: 60 G | Refills: 5 | Status: SHIPPED | OUTPATIENT
Start: 2023-10-09

## 2023-10-09 RX ORDER — DUPILUMAB 300 MG/2ML
INJECTION, SOLUTION SUBCUTANEOUS
Qty: 4 ML | Refills: 11 | Status: ACTIVE | OUTPATIENT
Start: 2023-10-09

## 2023-10-09 NOTE — PROGRESS NOTES
Subjective:       Patient ID:  Krish Ramos is a 43 y.o. male who presents for No chief complaint on file.    The patient location is: home  The chief complaint leading to consultation is: dupixent refill    Visit type: audiovisual    Face to Face time with patient: 25 min  30 minutes of total time spent on the encounter, which includes face to face time and non-face to face time preparing to see the patient (eg, review of tests), Obtaining and/or reviewing separately obtained history, Documenting clinical information in the electronic or other health record, Independently interpreting results (not separately reported) and communicating results to the patient/family/caregiver, or Care coordination (not separately reported).         Each patient to whom he or she provides medical services by telemedicine is:  (1) informed of the relationship between the physician and patient and the respective role of any other health care provider with respect to management of the patient; and (2) notified that he or she may decline to receive medical services by telemedicine and may withdraw from such care at any time.    Notes:     Hx of severe atopic dermatitis and allergies to fish, walnuts and stone fruit, last seen on 8/29/22.  On dupixent intermittent since 2019.  + improvement in skin.  Has few residual areas. He states pruritus and discoloration has improved with tacrolimus ointment.    Prior treatments: dupixent since 2019, betamethasone, NBUVB, triamcinonlone, epiceram, tacrolimus    Denies hx of cold sores, conjunctivitis, painful vision or sores in or near the eye.             Review of Systems   Constitutional:  Negative for fever and chills.   HENT:  Negative for mouth sores.    Eyes:  Negative for itching, eye watering, eye irritation and eyelid inflammation.   Respiratory:  Negative for shortness of breath.    Gastrointestinal:  Negative for nausea, vomiting and diarrhea.   Genitourinary:  Negative for genital  sores.   Skin:  Positive for itching, rash and activity-related sunscreen use. Negative for daily sunscreen use and recent sunburn.   Hematologic/Lymphatic: Does not bruise/bleed easily.   Allergic/Immunologic: Positive for environmental allergies.        Objective:    Physical Exam   Constitutional: He appears well-developed and well-nourished. No distress.   Neurological: He is alert and oriented to person, place, and time. He is not disoriented.   Psychiatric: He has a normal mood and affect.   Skin:   Areas Examined (abnormalities noted in diagram):   Head / Face Inspection Performed  Neck Inspection Performed  RUE Inspected  LUE Inspection Performed  Nails and Digits Inspection Performed                 Assessment / Plan:        Other atopic dermatitis  Encounter for long-term (current) use of medications  -     CBC Auto Differential; Future; Expected date: 10/09/2023  -     Comprehensive Metabolic Panel; Future; Expected date: 10/09/2023  -     tacrolimus (PROTOPIC) 0.1 % ointment; Apply topically 2 (two) times daily. Non-steroid. Safe to use long-term  Dispense: 60 g; Refill: 5  -     DUPIXENT SYRINGE 300 mg/2 mL Syrg; Inject 300mg (1 syringe) into the skin every other week.  Dispense: 4 mL; Refill: 11  -     will check labs.  EASI score improve to 1-2 (from baseline of 25).  Marked improvement from prior.  We will plan to continue with Dupixent.  We will refill tacrolimus ointment.      Reviewed side effects of immunosuppression, conjunctivitis/keratitis and reactivation of the herpes virus.  Discussed that if patient develops sores, vesicles, or ulcerations near the eye, then he should present to the emergency room for evaluation and ophthalmology consultation.  The patient acknowledged understanding and wishes to proceed with Dupixent therapy.            Follow up in about 1 year (around 10/9/2024).

## 2023-10-09 NOTE — TELEPHONE ENCOUNTER
Called and spoke to patient. Pt scheduled for labs tomorrow at the [place]. 1 year recall placed.   ----- Message from Sonja Rapp MD sent at 10/9/2023 11:43 AM CDT -----  Please call patient to schedule labs and send a 1 year appointment reminder

## 2023-10-10 ENCOUNTER — LAB VISIT (OUTPATIENT)
Dept: LAB | Facility: HOSPITAL | Age: 43
End: 2023-10-10
Attending: DERMATOLOGY
Payer: COMMERCIAL

## 2023-10-10 DIAGNOSIS — Z79.899 ENCOUNTER FOR LONG-TERM (CURRENT) USE OF MEDICATIONS: ICD-10-CM

## 2023-10-10 DIAGNOSIS — L20.89 OTHER ATOPIC DERMATITIS: ICD-10-CM

## 2023-10-10 LAB
ALBUMIN SERPL BCP-MCNC: 3.7 G/DL (ref 3.5–5.2)
ALP SERPL-CCNC: 68 U/L (ref 55–135)
ALT SERPL W/O P-5'-P-CCNC: 26 U/L (ref 10–44)
ANION GAP SERPL CALC-SCNC: 9 MMOL/L (ref 8–16)
AST SERPL-CCNC: 21 U/L (ref 10–40)
BASOPHILS # BLD AUTO: 0.1 K/UL (ref 0–0.2)
BASOPHILS NFR BLD: 1.6 % (ref 0–1.9)
BILIRUB SERPL-MCNC: 0.7 MG/DL (ref 0.1–1)
BUN SERPL-MCNC: 9 MG/DL (ref 6–20)
CALCIUM SERPL-MCNC: 10.2 MG/DL (ref 8.7–10.5)
CHLORIDE SERPL-SCNC: 103 MMOL/L (ref 95–110)
CO2 SERPL-SCNC: 24 MMOL/L (ref 23–29)
CREAT SERPL-MCNC: 0.8 MG/DL (ref 0.5–1.4)
DIFFERENTIAL METHOD: ABNORMAL
EOSINOPHIL # BLD AUTO: 0.5 K/UL (ref 0–0.5)
EOSINOPHIL NFR BLD: 7.5 % (ref 0–8)
ERYTHROCYTE [DISTWIDTH] IN BLOOD BY AUTOMATED COUNT: 13.6 % (ref 11.5–14.5)
EST. GFR  (NO RACE VARIABLE): >60 ML/MIN/1.73 M^2
GLUCOSE SERPL-MCNC: 101 MG/DL (ref 70–110)
HCT VFR BLD AUTO: 40.6 % (ref 40–54)
HGB BLD-MCNC: 13.4 G/DL (ref 14–18)
IMM GRANULOCYTES # BLD AUTO: 0.01 K/UL (ref 0–0.04)
IMM GRANULOCYTES NFR BLD AUTO: 0.2 % (ref 0–0.5)
LYMPHOCYTES # BLD AUTO: 2.1 K/UL (ref 1–4.8)
LYMPHOCYTES NFR BLD: 34 % (ref 18–48)
MCH RBC QN AUTO: 29.5 PG (ref 27–31)
MCHC RBC AUTO-ENTMCNC: 33 G/DL (ref 32–36)
MCV RBC AUTO: 89 FL (ref 82–98)
MONOCYTES # BLD AUTO: 0.5 K/UL (ref 0.3–1)
MONOCYTES NFR BLD: 8.1 % (ref 4–15)
NEUTROPHILS # BLD AUTO: 3 K/UL (ref 1.8–7.7)
NEUTROPHILS NFR BLD: 48.6 % (ref 38–73)
NRBC BLD-RTO: 0 /100 WBC
PLATELET # BLD AUTO: 390 K/UL (ref 150–450)
PMV BLD AUTO: 9.8 FL (ref 9.2–12.9)
POTASSIUM SERPL-SCNC: 3.8 MMOL/L (ref 3.5–5.1)
PROT SERPL-MCNC: 8.1 G/DL (ref 6–8.4)
RBC # BLD AUTO: 4.54 M/UL (ref 4.6–6.2)
SODIUM SERPL-SCNC: 136 MMOL/L (ref 136–145)
WBC # BLD AUTO: 6.14 K/UL (ref 3.9–12.7)

## 2023-10-10 PROCEDURE — 36415 COLL VENOUS BLD VENIPUNCTURE: CPT | Performed by: DERMATOLOGY

## 2023-10-10 PROCEDURE — 80053 COMPREHEN METABOLIC PANEL: CPT | Performed by: DERMATOLOGY

## 2023-10-10 PROCEDURE — 85025 COMPLETE CBC W/AUTO DIFF WBC: CPT | Performed by: DERMATOLOGY

## 2023-10-26 DIAGNOSIS — I49.9 CARDIAC ARRHYTHMIA, UNSPECIFIED CARDIAC ARRHYTHMIA TYPE: Primary | ICD-10-CM

## 2023-11-02 ENCOUNTER — HOSPITAL ENCOUNTER (OUTPATIENT)
Dept: CARDIOLOGY | Facility: HOSPITAL | Age: 43
Discharge: HOME | End: 2023-11-02
Attending: INTERNAL MEDICINE
Payer: COMMERCIAL

## 2023-11-02 ENCOUNTER — OFFICE VISIT (OUTPATIENT)
Dept: CARDIOLOGY | Facility: CLINIC | Age: 43
End: 2023-11-02
Payer: COMMERCIAL

## 2023-11-02 VITALS
WEIGHT: 315 LBS | WEIGHT: 315 LBS | BODY MASS INDEX: 42.66 KG/M2 | HEART RATE: 98 BPM | BODY MASS INDEX: 44.46 KG/M2 | SYSTOLIC BLOOD PRESSURE: 130 MMHG | OXYGEN SATURATION: 99 % | HEIGHT: 72 IN | DIASTOLIC BLOOD PRESSURE: 85 MMHG

## 2023-11-02 DIAGNOSIS — E66.01 SEVERE OBESITY (BMI >= 40): ICD-10-CM

## 2023-11-02 DIAGNOSIS — G47.33 OSA (OBSTRUCTIVE SLEEP APNEA): ICD-10-CM

## 2023-11-02 DIAGNOSIS — R94.31 NONSPECIFIC ABNORMAL ELECTROCARDIOGRAM (ECG) (EKG): ICD-10-CM

## 2023-11-02 DIAGNOSIS — E78.49 OTHER HYPERLIPIDEMIA: Primary | ICD-10-CM

## 2023-11-02 DIAGNOSIS — E11.69 TYPE 2 DIABETES MELLITUS WITH OTHER SPECIFIED COMPLICATION, UNSPECIFIED WHETHER LONG TERM INSULIN USE: ICD-10-CM

## 2023-11-02 DIAGNOSIS — I49.9 CARDIAC ARRHYTHMIA, UNSPECIFIED CARDIAC ARRHYTHMIA TYPE: ICD-10-CM

## 2023-11-02 DIAGNOSIS — I10 PRIMARY HYPERTENSION: ICD-10-CM

## 2023-11-02 PROCEDURE — 3075F SYST BP GE 130 - 139MM HG: CPT | Mod: CPTII,S$GLB,, | Performed by: INTERNAL MEDICINE

## 2023-11-02 PROCEDURE — 3075F PR MOST RECENT SYSTOLIC BLOOD PRESS GE 130-139MM HG: ICD-10-PCS | Mod: CPTII,S$GLB,, | Performed by: INTERNAL MEDICINE

## 2023-11-02 PROCEDURE — 3079F DIAST BP 80-89 MM HG: CPT | Mod: CPTII,S$GLB,, | Performed by: INTERNAL MEDICINE

## 2023-11-02 PROCEDURE — 93010 EKG 12-LEAD: ICD-10-PCS | Mod: ,,, | Performed by: STUDENT IN AN ORGANIZED HEALTH CARE EDUCATION/TRAINING PROGRAM

## 2023-11-02 PROCEDURE — 3008F BODY MASS INDEX DOCD: CPT | Mod: CPTII,S$GLB,, | Performed by: INTERNAL MEDICINE

## 2023-11-02 PROCEDURE — 99999 PR PBB SHADOW E&M-EST. PATIENT-LVL IV: ICD-10-PCS | Mod: PBBFAC,,, | Performed by: INTERNAL MEDICINE

## 2023-11-02 PROCEDURE — 93010 ELECTROCARDIOGRAM REPORT: CPT | Mod: ,,, | Performed by: STUDENT IN AN ORGANIZED HEALTH CARE EDUCATION/TRAINING PROGRAM

## 2023-11-02 PROCEDURE — 3079F PR MOST RECENT DIASTOLIC BLOOD PRESSURE 80-89 MM HG: ICD-10-PCS | Mod: CPTII,S$GLB,, | Performed by: INTERNAL MEDICINE

## 2023-11-02 PROCEDURE — 93005 ELECTROCARDIOGRAM TRACING: CPT

## 2023-11-02 PROCEDURE — 3008F PR BODY MASS INDEX (BMI) DOCUMENTED: ICD-10-PCS | Mod: CPTII,S$GLB,, | Performed by: INTERNAL MEDICINE

## 2023-11-02 PROCEDURE — 1159F MED LIST DOCD IN RCRD: CPT | Mod: CPTII,S$GLB,, | Performed by: INTERNAL MEDICINE

## 2023-11-02 PROCEDURE — 3072F PR LOW RISK FOR RETINOPATHY: ICD-10-PCS | Mod: CPTII,S$GLB,, | Performed by: INTERNAL MEDICINE

## 2023-11-02 PROCEDURE — 99999 PR PBB SHADOW E&M-EST. PATIENT-LVL IV: CPT | Mod: PBBFAC,,, | Performed by: INTERNAL MEDICINE

## 2023-11-02 PROCEDURE — 99214 OFFICE O/P EST MOD 30 MIN: CPT | Mod: S$GLB,,, | Performed by: INTERNAL MEDICINE

## 2023-11-02 PROCEDURE — 99214 PR OFFICE/OUTPT VISIT, EST, LEVL IV, 30-39 MIN: ICD-10-PCS | Mod: S$GLB,,, | Performed by: INTERNAL MEDICINE

## 2023-11-02 PROCEDURE — 3072F LOW RISK FOR RETINOPATHY: CPT | Mod: CPTII,S$GLB,, | Performed by: INTERNAL MEDICINE

## 2023-11-02 PROCEDURE — 1160F RVW MEDS BY RX/DR IN RCRD: CPT | Mod: CPTII,S$GLB,, | Performed by: INTERNAL MEDICINE

## 2023-11-02 PROCEDURE — 1160F PR REVIEW ALL MEDS BY PRESCRIBER/CLIN PHARMACIST DOCUMENTED: ICD-10-PCS | Mod: CPTII,S$GLB,, | Performed by: INTERNAL MEDICINE

## 2023-11-02 PROCEDURE — 1159F PR MEDICATION LIST DOCUMENTED IN MEDICAL RECORD: ICD-10-PCS | Mod: CPTII,S$GLB,, | Performed by: INTERNAL MEDICINE

## 2023-11-02 RX ORDER — SEMAGLUTIDE 2.68 MG/ML
2 INJECTION, SOLUTION SUBCUTANEOUS
Qty: 3 ML | Refills: 11 | Status: SHIPPED | OUTPATIENT
Start: 2023-11-02 | End: 2024-03-08 | Stop reason: SDUPTHER

## 2023-11-02 NOTE — PROGRESS NOTES
Subjective:   Patient ID:  Krish Ramos is a 43 y.o. male who presents for cardiac consult of No chief complaint on file.    Referring physician: Rohit Gomez MD   Reason for consult: abnormal ECG    HPI  The patient came in today for cardiac consult of No chief complaint on file.      Krish Ramos is a 43 y.o. male pt with HTN, HLD, DM2, obesity BMI > 40, LOLLY presents for follow up CV eval.     3/30/23  Recent ECG with inferior Q waves. Had sleep study - Moderate obstructive sleep apnea suboptimal compliance with CPAP.  Home sleep study detected AHI 90 events per hour.  Possible cardiac dysrhythmia.  Works with [employer]. PT does lift and walks and aqua therapy has knee pain.     11/2/23  ECHO 4/2023 with normal bi V function, mild TR, PASP 25 mmHg. ECG stress test 4/2023 neg for ischemia.     BP elevated today 130s/90s. HR 90s. BMI 44 - 327 lbs. Tried ozempic but had diarrhea.   ECG - NSR, poor RWP - old     Patient has fairly good exercise tolerance. Works for remotely for [employer]    Patient is compliant with medications.    FH - father - HTN, DM - 75    Results for orders placed during the hospital encounter of 04/21/23    Echo    Interpretation Summary  · The left ventricle is normal in size with mild concentric hypertrophy and normal systolic function.  · The estimated ejection fraction is 65%.  · Normal left ventricular diastolic function.  · Normal right ventricular size with normal right ventricular systolic function.  · Mild tricuspid regurgitation.  · Intermediate central venous pressure (8 mmHg).  · The estimated PA systolic pressure is 25 mmHg.      Results for orders placed during the hospital encounter of 04/21/23    Exercise Stress - EKG    Interpretation Summary    The ECG portion of the study is negative for ischemia.    The patient reported no chest pain during the stress test.    The blood pressure response to stress was normal.    The  exercise capacity was above average.    The patient exercised for 6 minutes 0 seconds on a Felipe protocol, corresponding to a functional capacity of 7 METS, achieving a peak heart rate of 171 bpm, which is 96 % of the age predicted maximum heart rate. Their exercise capacity was above average.      ECG  Normal sinus rhythm   Inferior infarct ,age undetermined   Abnormal ECG   No previous ECGs available   Confirmed by MANJINDER ALANIZ MD ([id number]) on 3/10/2023 11:45:51 AM     Referred By: MONTEZ DEL RIO           Confirmed By:MANJINDER ALANIZ MD    Past Medical History:   Diagnosis Date    Diabetes mellitus     Eczema     HLD (hyperlipidemia)     Primary hypertension        Past Surgical History:   Procedure Laterality Date    FEMUR FRACTURE SURGERY Right        Social History     Tobacco Use    Smoking status: Never    Smokeless tobacco: Never   Substance Use Topics    Alcohol use: Not Currently    Drug use: Never       History reviewed. No pertinent family history.    Patient's Medications   New Prescriptions    No medications on file   Previous Medications    BLOOD SUGAR DIAGNOSTIC STRP    Use with glucometer daily as needed. Please send accu-chek dawit strips.    DUPIXENT SYRINGE 300 MG/2 ML SYRG    Inject 300mg (1 syringe) into the skin every other week.    EPINEPHRINE (EPIPEN) 0.3 MG/0.3 ML ATIN    Inject 0.3 mLs (0.3 mg total) into the muscle once. for 1 dose    LANCETS (ACCU-CHEK SOFTCLIX LANCETS) MISC    1 each by Misc.(Non-Drug; Combo Route) route 2 (two) times a day.    OLMESARTAN-AMLODIPIN-HCTHIAZID 40-10-25 MG TAB    TAKE 1 TABLET BY MOUTH EVERY DAY    SEMAGLUTIDE (OZEMPIC) 1 MG/DOSE (4 MG/3 ML)    Inject 1 mg into the skin every 7 days.    TACROLIMUS (PROTOPIC) 0.1 % OINTMENT    Apply topically 2 (two) times daily. Non-steroid. Safe to use long-term    TIZANIDINE (ZANAFLEX) 4 MG TABLET    Take 4 mg by mouth nightly.    TRIAMCINOLONE ACETONIDE 0.1% (KENALOG) 0.1 % OINTMENT    AAA bid prn. Do not use on face, underarms  or groin.   Modified Medications    No medications on file   Discontinued Medications    No medications on file       Review of Systems   Constitutional: Negative.    HENT: Negative.     Eyes: Negative.    Respiratory: Negative.     Cardiovascular: Negative.    Gastrointestinal: Negative.    Genitourinary: Negative.    Musculoskeletal: Negative.    Skin: Negative.    Neurological: Negative.    Endo/Heme/Allergies: Negative.    Psychiatric/Behavioral: Negative.     All 12 systems otherwise negative.      Wt Readings from Last 3 Encounters:   11/02/23 (!) 148.7 kg (327 lb 13.2 oz)   11/02/23 (!) 148.7 kg (327 lb 13.2 oz)   09/15/23 (!) 147.7 kg (325 lb 9.9 oz)     Temp Readings from Last 3 Encounters:   09/20/21 98.1 °F (36.7 °C) (Temporal)     BP Readings from Last 3 Encounters:   11/02/23 130/85   09/15/23 132/78   04/21/23 135/75     Pulse Readings from Last 3 Encounters:   11/02/23 98   09/15/23 91   03/30/23 95       /85 (BP Location: Right arm, Patient Position: Sitting, BP Method: Large (Manual))   Pulse 98   Ht 6' (1.829 m)   Wt (!) 148.7 kg (327 lb 13.2 oz)   SpO2 99%   BMI 44.46 kg/m²     Objective:   Physical Exam  Vitals and nursing note reviewed.   Constitutional:       General: He is not in acute distress.     Appearance: He is well-developed. He is obese. He is not diaphoretic.   HENT:      Head: Normocephalic and atraumatic.      Nose: Nose normal.   Eyes:      General: No scleral icterus.     Conjunctiva/sclera: Conjunctivae normal.   Neck:      Thyroid: No thyromegaly.      Vascular: No JVD.   Cardiovascular:      Rate and Rhythm: Normal rate and regular rhythm.      Heart sounds: S1 normal and S2 normal. No murmur heard.     No friction rub. No gallop. No S3 or S4 sounds.   Pulmonary:      Effort: Pulmonary effort is normal. No respiratory distress.      Breath sounds: Normal breath sounds. No stridor. No wheezing or rales.   Chest:      Chest wall: No tenderness.   Abdominal:       General: Bowel sounds are normal. There is no distension.      Palpations: Abdomen is soft. There is no mass.      Tenderness: There is no abdominal tenderness. There is no rebound.   Genitourinary:     Comments: Deferred  Musculoskeletal:         General: No tenderness or deformity. Normal range of motion.      Cervical back: Normal range of motion and neck supple.   Lymphadenopathy:      Cervical: No cervical adenopathy.   Skin:     General: Skin is warm and dry.      Coloration: Skin is not pale.      Findings: No erythema or rash.   Neurological:      Mental Status: He is alert and oriented to person, place, and time.      Motor: No abnormal muscle tone.      Coordination: Coordination normal.   Psychiatric:         Behavior: Behavior normal.         Thought Content: Thought content normal.         Judgment: Judgment normal.         Lab Results   Component Value Date     10/10/2023    K 3.8 10/10/2023     10/10/2023    CO2 24 10/10/2023    BUN 9 10/10/2023    CREATININE 0.8 10/10/2023     10/10/2023    HGBA1C 7.2 (H) 11/17/2022    AST 21 10/10/2023    ALT 26 10/10/2023    ALBUMIN 3.7 10/10/2023    PROT 8.1 10/10/2023    BILITOT 0.7 10/10/2023    WBC 6.14 10/10/2023    HGB 13.4 (L) 10/10/2023    HCT 40.6 10/10/2023    MCV 89 10/10/2023     10/10/2023    CHOL 225 (H) 11/17/2022    HDL 50 11/17/2022    LDLCALC 151.8 11/17/2022    TRIG 116 11/17/2022     Assessment:      1. Other hyperlipidemia    2. Primary hypertension    3. LOLLY (obstructive sleep apnea)    4. Type 2 diabetes mellitus with other specified complication, unspecified whether long term insulin use    5. Nonspecific abnormal electrocardiogram (ECG) (EKG)    6. Severe obesity (BMI >= 40)          Plan:     Abnormal ECG - inferior Q waves  - ECHO 4/2023 with normal bi V function, mild TR, PASP 25 mmHg. ECG stress test 4/2023 neg for ischemia.   - will discuss if further testing needed    2. HTN  - titrate meds    3. HLD  - needs  improvement   - not on meds now - had recent labs - will repeat     4. LOLLY, moderate  - cont CPAP    5. DM2 A1c 7.2  - cont tx, needs improvement , not on meds now  - diet controlled   - tried Ozempic -had diarrhea , will try to restart - Ozempic 2mg  - to reduce CV risk and MI risk    6. Obesity, BMI 45 -->  BMI 44 - 327 lbs.   - cont weight loss     Thank you for allowing me to participate in this patient's care. Please do not hesitate to contact me with any questions or concerns. Consult note has been forwarded to the referral physician.

## 2023-11-13 ENCOUNTER — PATIENT MESSAGE (OUTPATIENT)
Dept: CARDIOLOGY | Facility: CLINIC | Age: 43
End: 2023-11-13
Payer: COMMERCIAL

## 2023-11-13 DIAGNOSIS — E11.69 TYPE 2 DIABETES MELLITUS WITH OTHER SPECIFIED COMPLICATION, UNSPECIFIED WHETHER LONG TERM INSULIN USE: Primary | ICD-10-CM

## 2023-11-13 RX ORDER — SEMAGLUTIDE 1.34 MG/ML
1 INJECTION, SOLUTION SUBCUTANEOUS
Qty: 3 ML | Refills: 1 | Status: SHIPPED | OUTPATIENT
Start: 2023-11-13 | End: 2024-01-31

## 2024-01-23 ENCOUNTER — PATIENT MESSAGE (OUTPATIENT)
Dept: FAMILY MEDICINE | Facility: CLINIC | Age: 44
End: 2024-01-23
Payer: COMMERCIAL

## 2024-01-23 DIAGNOSIS — I10 HYPERTENSION, UNSPECIFIED TYPE: ICD-10-CM

## 2024-01-23 RX ORDER — OLMESARTAN MEDOXOMIL, AMLODIPINE AND HYDROCHLOROTHIAZIDE TABLET 40/10/25 MG 40; 10; 25 MG/1; MG/1; MG/1
1 TABLET ORAL DAILY
Qty: 90 TABLET | Refills: 0 | OUTPATIENT
Start: 2024-01-23

## 2024-01-23 NOTE — TELEPHONE ENCOUNTER
Provider Staff:  Please note Refusal of medication.     Action required for this patient.      Requested Prescriptions     Refused Prescriptions Disp Refills    olmesartan-amLODIPin-hcthiazid 40-10-25 mg Tab 90 tablet 0     Sig: Take 1 tablet by mouth once daily.     Refused By: PATTY COTTO     Reason for Refusal: Patient needs an appointment      Thanks!  [facility]   Note composed: 01/23/2024 11:11 AM

## 2024-01-23 NOTE — TELEPHONE ENCOUNTER
Refill Routing Note   Medication(s) are not appropriate for processing by [facility] for the following reason(s):        Non-participating provider    [facility] action(s):  Route               Appointments  past 12m or future 3m with PCP    Date Provider   Last Visit   8/11/2022 Caro Pham MD   Next Visit   Visit date not found Caro Pham MD   ED visits in past 90 days: 0        Note composed:10:38 AM 01/23/2024

## 2024-01-29 ENCOUNTER — TELEPHONE (OUTPATIENT)
Dept: FAMILY MEDICINE | Facility: CLINIC | Age: 44
End: 2024-01-29
Payer: COMMERCIAL

## 2024-01-30 ENCOUNTER — OFFICE VISIT (OUTPATIENT)
Dept: FAMILY MEDICINE | Facility: CLINIC | Age: 44
End: 2024-01-30
Payer: COMMERCIAL

## 2024-01-30 VITALS
SYSTOLIC BLOOD PRESSURE: 126 MMHG | WEIGHT: 315 LBS | BODY MASS INDEX: 42.66 KG/M2 | HEART RATE: 93 BPM | HEIGHT: 72 IN | DIASTOLIC BLOOD PRESSURE: 88 MMHG | OXYGEN SATURATION: 97 % | RESPIRATION RATE: 16 BRPM | TEMPERATURE: 99 F

## 2024-01-30 DIAGNOSIS — I10 HYPERTENSION, UNSPECIFIED TYPE: ICD-10-CM

## 2024-01-30 DIAGNOSIS — Z00.00 PHYSICAL EXAM: ICD-10-CM

## 2024-01-30 DIAGNOSIS — E11.9 TYPE 2 DIABETES MELLITUS WITHOUT COMPLICATION, WITHOUT LONG-TERM CURRENT USE OF INSULIN: Primary | ICD-10-CM

## 2024-01-30 PROCEDURE — 99999 PR PBB SHADOW E&M-EST. PATIENT-LVL IV: CPT | Mod: PBBFAC,,, | Performed by: NURSE PRACTITIONER

## 2024-01-30 PROCEDURE — 3008F BODY MASS INDEX DOCD: CPT | Mod: CPTII,S$GLB,, | Performed by: NURSE PRACTITIONER

## 2024-01-30 PROCEDURE — 99214 OFFICE O/P EST MOD 30 MIN: CPT | Mod: S$GLB,,, | Performed by: NURSE PRACTITIONER

## 2024-01-30 PROCEDURE — 1160F RVW MEDS BY RX/DR IN RCRD: CPT | Mod: CPTII,S$GLB,, | Performed by: NURSE PRACTITIONER

## 2024-01-30 PROCEDURE — 1159F MED LIST DOCD IN RCRD: CPT | Mod: CPTII,S$GLB,, | Performed by: NURSE PRACTITIONER

## 2024-01-30 PROCEDURE — 3079F DIAST BP 80-89 MM HG: CPT | Mod: CPTII,S$GLB,, | Performed by: NURSE PRACTITIONER

## 2024-01-30 PROCEDURE — 3074F SYST BP LT 130 MM HG: CPT | Mod: CPTII,S$GLB,, | Performed by: NURSE PRACTITIONER

## 2024-01-30 RX ORDER — OLMESARTAN MEDOXOMIL, AMLODIPINE AND HYDROCHLOROTHIAZIDE TABLET 40/10/25 MG 40; 10; 25 MG/1; MG/1; MG/1
1 TABLET ORAL DAILY
Qty: 90 TABLET | Refills: 3 | Status: SHIPPED | OUTPATIENT
Start: 2024-01-30

## 2024-01-30 NOTE — PROGRESS NOTES
Krish Ramos  01/30/2024  [id number]    Joie Turcios MD  Patient Care Team:  Joie Turcios MD as PCP - General (Family Medicine)  Kristy Galvan NP as Nurse Practitioner (Family Medicine)          Visit Type: establish care     Chief Complaint:  Chief Complaint   Patient presents with    Hypertension       History of Present Illness:  43 year old male presents establishing care with Dr. Turcois and medication refills.    Denies nasal congestion, ear pain, fever, cough, chills, body aches, chest pain, nausea, vomiting, diarrhea, abdominal pain, weakness, shortness of breath, wheezing.   No other complaints at this time.        History:  Past Medical History:   Diagnosis Date    Diabetes mellitus     Eczema     HLD (hyperlipidemia)     Primary hypertension      Past Surgical History:   Procedure Laterality Date    FEMUR FRACTURE SURGERY Right      History reviewed. No pertinent family history.  Social History     Socioeconomic History    Marital status: Single   Tobacco Use    Smoking status: Never    Smokeless tobacco: Never   Substance and Sexual Activity    Alcohol use: Not Currently    Drug use: Never    Sexual activity: Not Currently     Social Determinants of Health     Financial Resource Strain: Low Risk  (1/24/2024)    Overall Financial Resource Strain (CARDIA)     Difficulty of Paying Living Expenses: Not hard at all   Food Insecurity: No Food Insecurity (1/24/2024)    Hunger Vital Sign     Worried About Running Out of Food in the Last Year: Never true     Ran Out of Food in the Last Year: Never true   Transportation Needs: No Transportation Needs (1/24/2024)    PRAPARE - Transportation     Lack of Transportation (Medical): No     Lack of Transportation (Non-Medical): No   Physical Activity: Sufficiently Active (1/24/2024)    Exercise Vital Sign     Days of Exercise per Week: 3 days     Minutes of Exercise per Session: 60 min   Stress: No Stress Concern Present (1/24/2024)    [employer]  of Occupational Health - Occupational Stress Questionnaire     Feeling of Stress : Not at all   Social Connections: Unknown (1/24/2024)    Social Connection and Isolation Panel [NHANES]     Frequency of Communication with Friends and Family: Three times a week     Frequency of Social Gatherings with Friends and Family: Three times a week     Active Member of Clubs or Organizations: Yes     Attends Club or Organization Meetings: More than 4 times per year     Marital Status: Never    Housing Stability: Low Risk  (1/24/2024)    Housing Stability Vital Sign     Unable to Pay for Housing in the Last Year: No     Number of Places Lived in the Last Year: 1     Unstable Housing in the Last Year: No     Patient Active Problem List   Diagnosis    Sleep apnea    LOLLY (obstructive sleep apnea)    HLD (hyperlipidemia)    Primary hypertension    Diabetes mellitus    Morbid obesity     Review of patient's allergies indicates:   Allergen Reactions    Fish containing products Itching, Nausea Only and Swelling    Lisinopril Shortness Of Breath    Hydrocodone-acetaminophen Nausea Only       The following were reviewed at this visit: active problem list, medication list, allergies, family history, social history, and health maintenance.    Medications:  Current Outpatient Medications on File Prior to Visit   Medication Sig Dispense Refill    blood sugar diagnostic Strp Use with glucometer daily as needed. Please send accu-chek dawit strips. 100 strip 2    DUPIXENT SYRINGE 300 mg/2 mL Syrg Inject 300mg (1 syringe) into the skin every other week. 4 mL 11    lancets (ACCU-CHEK SOFTCLIX LANCETS) Misc 1 each by Misc.(Non-Drug; Combo Route) route 2 (two) times a day. 100 each 3    semaglutide (OZEMPIC) 2 mg/dose (8 mg/3 mL) PnIj Inject 2 mg into the skin every 7 days. 3 mL 11    tacrolimus (PROTOPIC) 0.1 % ointment Apply topically 2 (two) times daily. Non-steroid. Safe to use long-term 60 g 5    triamcinolone acetonide 0.1% (KENALOG)  0.1 % ointment AAA bid prn. Do not use on face, underarms or groin. 80 g 3    [DISCONTINUED] olmesartan-amLODIPin-hcthiazid 40-10-25 mg Tab TAKE 1 TABLET BY MOUTH EVERY DAY 90 tablet 0    EPINEPHrine (EPIPEN) 0.3 mg/0.3 mL AtIn Inject 0.3 mLs (0.3 mg total) into the muscle once. for 1 dose 2 Device 3    semaglutide (OZEMPIC) 1 mg/dose (4 mg/3 mL) Inject 1 mg into the skin every 7 days. 3 mL 1    tiZANidine (ZANAFLEX) 4 MG tablet Take 4 mg by mouth nightly.       No current facility-administered medications on file prior to visit.       Medications have been reviewed and reconciled with patient at this visit.  Barriers to medications reviewed with patient.    Adverse reactions to current medications reviewed with patient..    Over the counter medications reviewed and reconciled with patient.    Exam:  Wt Readings from Last 3 Encounters:   01/30/24 (!) 144.2 kg (317 lb 14.5 oz)   11/02/23 (!) 148.7 kg (327 lb 13.2 oz)   11/02/23 (!) 148.7 kg (327 lb 13.2 oz)     Temp Readings from Last 3 Encounters:   01/30/24 99.2 °F (37.3 °C) (Tympanic)   09/20/21 98.1 °F (36.7 °C) (Temporal)     BP Readings from Last 3 Encounters:   01/30/24 126/88   11/02/23 130/85   09/15/23 132/78     Pulse Readings from Last 3 Encounters:   01/30/24 93   11/02/23 98   09/15/23 91     Body mass index is 43.12 kg/m².      Review of Systems   Constitutional:  Negative for fever and malaise/fatigue.   Eyes:  Negative for blurred vision.   Respiratory:  Negative for cough, shortness of breath and wheezing.    Cardiovascular:  Negative for chest pain, palpitations, orthopnea and PND.   Gastrointestinal:  Negative for nausea.   Musculoskeletal:  Negative for neck pain.   Neurological:  Negative for speech change, weakness and headaches.   All other systems reviewed and are negative.    Physical Exam  Vitals and nursing note reviewed.   Constitutional:       Appearance: Normal appearance. He is obese.   HENT:      Head: Normocephalic and atraumatic.       Right Ear: Tympanic membrane, ear canal and external ear normal.      Left Ear: Tympanic membrane, ear canal and external ear normal.      Nose: Nose normal.      Mouth/Throat:      Mouth: Mucous membranes are moist.      Pharynx: Oropharynx is clear.   Eyes:      Extraocular Movements: Extraocular movements intact.      Conjunctiva/sclera: Conjunctivae normal.      Pupils: Pupils are equal, round, and reactive to light.   Cardiovascular:      Rate and Rhythm: Normal rate and regular rhythm.      Pulses: Normal pulses.      Heart sounds: Normal heart sounds.   Pulmonary:      Effort: Pulmonary effort is normal.      Breath sounds: Normal breath sounds.   Abdominal:      General: Bowel sounds are normal.      Palpations: Abdomen is soft.   Musculoskeletal:         General: Normal range of motion.      Cervical back: Normal range of motion and neck supple.   Skin:     General: Skin is warm and dry.      Capillary Refill: Capillary refill takes less than 2 seconds.   Neurological:      General: No focal deficit present.      Mental Status: He is alert and oriented to person, place, and time.   Psychiatric:         Mood and Affect: Mood normal.         Behavior: Behavior normal.         Thought Content: Thought content normal.         Judgment: Judgment normal.       Protective Sensation (w/ 10 gram monofilament):  Right: Intact  Left: Intact    Visual Inspection:  Normal -  Bilateral    Pedal Pulses:   Right: Present  Left: Present    Posterior Tibialis Pulses:   Right:Present  Left: Present   Laboratory Reviewed ({Yes)  Lab Results   Component Value Date    WBC 6.14 10/10/2023    HGB 13.4 (L) 10/10/2023    HCT 40.6 10/10/2023     10/10/2023    CHOL 225 (H) 11/17/2022    TRIG 116 11/17/2022    HDL 50 11/17/2022    ALT 26 10/10/2023    AST 21 10/10/2023     10/10/2023    K 3.8 10/10/2023     10/10/2023    CREATININE 0.8 10/10/2023    BUN 9 10/10/2023    CO2 24 10/10/2023    HGBA1C 7.2 (H) 11/17/2022        Krish was seen today for hypertension.    Diagnoses and all orders for this visit:    Type 2 diabetes mellitus without complication, without long-term current use of insulin  -     Foot Exam Performed  -     Hemoglobin A1C; Standing    Hypertension, unspecified type  -     olmesartan-amLODIPin-hcthiazid 40-10-25 mg Tab; Take 1 tablet by mouth once daily.  -     Lipid Panel; Standing  -     T4, Free; Standing  -     TSH; Standing    Physical exam  -     Comprehensive Metabolic Panel; Standing  -     CBC Auto Differential; Standing        PLAN   The current medical regimen is effective;  continue present plan and medications.         Care Plan/Goals: Reviewed    Goals    None     Krish was seen today for hypertension.    Diagnoses and all orders for this visit:    Type 2 diabetes mellitus without complication, without long-term current use of insulin  -     Foot Exam Performed  -     Hemoglobin A1C; Standing    Hypertension, unspecified type  -     olmesartan-amLODIPin-hcthiazid 40-10-25 mg Tab; Take 1 tablet by mouth once daily.  -     Lipid Panel; Standing  -     T4, Free; Standing  -     TSH; Standing    Physical exam  -     Comprehensive Metabolic Panel; Standing  -     CBC Auto Differential; Standing         Follow up: Follow up in about 6 months (around 7/30/2024).    After visit summary was printed and given to patient upon discharge today.  Patient goals and care plan are included in After Visit Summary.

## 2024-02-07 DIAGNOSIS — E11.9 TYPE 2 DIABETES MELLITUS WITHOUT COMPLICATION: ICD-10-CM

## 2024-02-19 ENCOUNTER — LAB VISIT (OUTPATIENT)
Dept: LAB | Facility: HOSPITAL | Age: 44
End: 2024-02-19
Attending: NURSE PRACTITIONER
Payer: COMMERCIAL

## 2024-02-19 DIAGNOSIS — E11.9 TYPE 2 DIABETES MELLITUS WITHOUT COMPLICATION, WITHOUT LONG-TERM CURRENT USE OF INSULIN: ICD-10-CM

## 2024-02-19 DIAGNOSIS — Z00.00 PHYSICAL EXAM: ICD-10-CM

## 2024-02-19 DIAGNOSIS — I10 HYPERTENSION, UNSPECIFIED TYPE: ICD-10-CM

## 2024-02-19 LAB
ALBUMIN SERPL BCP-MCNC: 3.6 G/DL (ref 3.5–5.2)
ALP SERPL-CCNC: 57 U/L (ref 55–135)
ALT SERPL W/O P-5'-P-CCNC: 18 U/L (ref 10–44)
ANION GAP SERPL CALC-SCNC: 7 MMOL/L (ref 8–16)
AST SERPL-CCNC: 20 U/L (ref 10–40)
BASOPHILS # BLD AUTO: 0.08 K/UL (ref 0–0.2)
BASOPHILS NFR BLD: 1.3 % (ref 0–1.9)
BILIRUB SERPL-MCNC: 1 MG/DL (ref 0.1–1)
BUN SERPL-MCNC: 12 MG/DL (ref 6–20)
CALCIUM SERPL-MCNC: 10.4 MG/DL (ref 8.7–10.5)
CHLORIDE SERPL-SCNC: 108 MMOL/L (ref 95–110)
CO2 SERPL-SCNC: 25 MMOL/L (ref 23–29)
CREAT SERPL-MCNC: 0.9 MG/DL (ref 0.5–1.4)
DIFFERENTIAL METHOD BLD: NORMAL
EOSINOPHIL # BLD AUTO: 0.3 K/UL (ref 0–0.5)
EOSINOPHIL NFR BLD: 4.2 % (ref 0–8)
ERYTHROCYTE [DISTWIDTH] IN BLOOD BY AUTOMATED COUNT: 14.1 % (ref 11.5–14.5)
EST. GFR  (NO RACE VARIABLE): >60 ML/MIN/1.73 M^2
GLUCOSE SERPL-MCNC: 87 MG/DL (ref 70–110)
HCT VFR BLD AUTO: 42.8 % (ref 40–54)
HGB BLD-MCNC: 14.2 G/DL (ref 14–18)
IMM GRANULOCYTES # BLD AUTO: 0.01 K/UL (ref 0–0.04)
IMM GRANULOCYTES NFR BLD AUTO: 0.2 % (ref 0–0.5)
LYMPHOCYTES # BLD AUTO: 2 K/UL (ref 1–4.8)
LYMPHOCYTES NFR BLD: 33.4 % (ref 18–48)
MCH RBC QN AUTO: 30.1 PG (ref 27–31)
MCHC RBC AUTO-ENTMCNC: 33.2 G/DL (ref 32–36)
MCV RBC AUTO: 91 FL (ref 82–98)
MONOCYTES # BLD AUTO: 0.6 K/UL (ref 0.3–1)
MONOCYTES NFR BLD: 10 % (ref 4–15)
NEUTROPHILS # BLD AUTO: 3.1 K/UL (ref 1.8–7.7)
NEUTROPHILS NFR BLD: 50.9 % (ref 38–73)
NRBC BLD-RTO: 0 /100 WBC
PLATELET # BLD AUTO: 373 K/UL (ref 150–450)
PMV BLD AUTO: 9.6 FL (ref 9.2–12.9)
POTASSIUM SERPL-SCNC: 3.9 MMOL/L (ref 3.5–5.1)
PROT SERPL-MCNC: 7.7 G/DL (ref 6–8.4)
RBC # BLD AUTO: 4.71 M/UL (ref 4.6–6.2)
SODIUM SERPL-SCNC: 140 MMOL/L (ref 136–145)
T4 FREE SERPL-MCNC: 1.02 NG/DL (ref 0.71–1.51)
TSH SERPL DL<=0.005 MIU/L-ACNC: 0.72 UIU/ML (ref 0.4–4)
WBC # BLD AUTO: 5.99 K/UL (ref 3.9–12.7)

## 2024-02-19 PROCEDURE — 36415 COLL VENOUS BLD VENIPUNCTURE: CPT | Performed by: NURSE PRACTITIONER

## 2024-02-19 PROCEDURE — 80053 COMPREHEN METABOLIC PANEL: CPT | Performed by: NURSE PRACTITIONER

## 2024-02-19 PROCEDURE — 84439 ASSAY OF FREE THYROXINE: CPT | Performed by: NURSE PRACTITIONER

## 2024-02-19 PROCEDURE — 80061 LIPID PANEL: CPT | Performed by: NURSE PRACTITIONER

## 2024-02-19 PROCEDURE — 83036 HEMOGLOBIN GLYCOSYLATED A1C: CPT | Performed by: NURSE PRACTITIONER

## 2024-02-19 PROCEDURE — 84443 ASSAY THYROID STIM HORMONE: CPT | Performed by: NURSE PRACTITIONER

## 2024-02-19 PROCEDURE — 85025 COMPLETE CBC W/AUTO DIFF WBC: CPT | Performed by: NURSE PRACTITIONER

## 2024-02-20 LAB
CHOLEST SERPL-MCNC: 192 MG/DL (ref 120–199)
CHOLEST/HDLC SERPL: 4.4 {RATIO} (ref 2–5)
ESTIMATED AVG GLUCOSE: 117 MG/DL (ref 68–131)
HBA1C MFR BLD: 5.7 % (ref 4–5.6)
HDLC SERPL-MCNC: 44 MG/DL (ref 40–75)
HDLC SERPL: 22.9 % (ref 20–50)
LDLC SERPL CALC-MCNC: 132.6 MG/DL (ref 63–159)
NONHDLC SERPL-MCNC: 148 MG/DL
TRIGL SERPL-MCNC: 77 MG/DL (ref 30–150)

## 2024-03-07 NOTE — PROGRESS NOTES
Subjective:   Patient ID:  Krish Ramos is a 43 y.o. male who presents for cardiac consult of No chief complaint on file.    Referring physician: Rohit Gomez MD   Reason for consult: abnormal ECG    HPI  The patient came in today for cardiac consult of No chief complaint on file.      Krish Ramos is a 43 y.o. male pt with HTN, HLD, DM2, obesity BMI > 40, LOLLY presents for follow up CV eval.     3/30/23  Recent ECG with inferior Q waves. Had sleep study - Moderate obstructive sleep apnea suboptimal compliance with CPAP.  Home sleep study detected AHI 90 events per hour.  Possible cardiac dysrhythmia.  Works with [employer]. PT does lift and walks and aqua therapy has knee pain.     11/2/23  ECHO 4/2023 with normal bi V function, mild TR, PASP 25 mmHg. ECG stress test 4/2023 neg for ischemia.     BP elevated today 130s/90s. HR 90s. BMI 44 - 327 lbs. Tried ozempic but had diarrhea.   ECG - NSR, poor RWP - old     3/8/24  BP elevated 140s/90s. . BMI 45 - 314 lbs.   BP improved in office.     Patient has fairly good exercise tolerance. Works for remotely for [employer] - for [employer]    Patient is compliant with medications.    FH - father - HTN, DM - 75    Results for orders placed during the hospital encounter of 04/21/23    Echo    Interpretation Summary  · The left ventricle is normal in size with mild concentric hypertrophy and normal systolic function.  · The estimated ejection fraction is 65%.  · Normal left ventricular diastolic function.  · Normal right ventricular size with normal right ventricular systolic function.  · Mild tricuspid regurgitation.  · Intermediate central venous pressure (8 mmHg).  · The estimated PA systolic pressure is 25 mmHg.      Results for orders placed during the hospital encounter of 04/21/23    Exercise Stress - EKG    Interpretation Summary    The ECG portion of the study is negative for ischemia.    The patient reported no chest pain during  the stress test.    The blood pressure response to stress was normal.    The exercise capacity was above average.    The patient exercised for 6 minutes 0 seconds on a Felipe protocol, corresponding to a functional capacity of 7 METS, achieving a peak heart rate of 171 bpm, which is 96 % of the age predicted maximum heart rate. Their exercise capacity was above average.      ECG  Normal sinus rhythm   Inferior infarct ,age undetermined   Abnormal ECG   No previous ECGs available   Confirmed by MANJINDER ALANIZ MD ([id number]) on 3/10/2023 11:45:51 AM     Referred By: MONTEZ DEL RIO           Confirmed By:MANJINDER ALANIZ MD    Past Medical History:   Diagnosis Date    Diabetes mellitus     Eczema     HLD (hyperlipidemia)     Primary hypertension        Past Surgical History:   Procedure Laterality Date    FEMUR FRACTURE SURGERY Right        Social History     Tobacco Use    Smoking status: Never    Smokeless tobacco: Never   Substance Use Topics    Alcohol use: Not Currently    Drug use: Never       History reviewed. No pertinent family history.    Patient's Medications   New Prescriptions    No medications on file   Previous Medications    BLOOD SUGAR DIAGNOSTIC STRP    Use with glucometer daily as needed. Please send accu-chek dawit strips.    DUPIXENT SYRINGE 300 MG/2 ML SYRG    Inject 300mg (1 syringe) into the skin every other week.    EPINEPHRINE (EPIPEN) 0.3 MG/0.3 ML ATIN    Inject 0.3 mLs (0.3 mg total) into the muscle once. for 1 dose    LANCETS (ACCU-CHEK SOFTCLIX LANCETS) MISC    1 each by Misc.(Non-Drug; Combo Route) route 2 (two) times a day.    OLMESARTAN-AMLODIPIN-HCTHIAZID 40-10-25 MG TAB    Take 1 tablet by mouth once daily.    TACROLIMUS (PROTOPIC) 0.1 % OINTMENT    Apply topically 2 (two) times daily. Non-steroid. Safe to use long-term    TIZANIDINE (ZANAFLEX) 4 MG TABLET    Take 4 mg by mouth nightly.    TRIAMCINOLONE ACETONIDE 0.1% (KENALOG) 0.1 % OINTMENT    AAA bid prn. Do not use on face, underarms or groin.    Modified Medications    Modified Medication Previous Medication    SEMAGLUTIDE (OZEMPIC) 2 MG/DOSE (8 MG/3 ML) PNIJ semaglutide (OZEMPIC) 2 mg/dose (8 mg/3 mL) PnIj       Inject 2 mg into the skin every 7 days.    Inject 2 mg into the skin every 7 days.   Discontinued Medications    No medications on file       Review of Systems   Constitutional: Negative.    HENT: Negative.     Eyes: Negative.    Respiratory: Negative.     Cardiovascular: Negative.    Gastrointestinal: Negative.    Genitourinary: Negative.    Musculoskeletal: Negative.    Skin: Negative.    Neurological: Negative.    Endo/Heme/Allergies: Negative.    Psychiatric/Behavioral: Negative.     All 12 systems otherwise negative.      Wt Readings from Last 3 Encounters:   03/08/24 (!) 142.7 kg (314 lb 9.5 oz)   01/30/24 (!) 144.2 kg (317 lb 14.5 oz)   11/02/23 (!) 148.7 kg (327 lb 13.2 oz)     Temp Readings from Last 3 Encounters:   01/30/24 99.2 °F (37.3 °C) (Tympanic)   09/20/21 98.1 °F (36.7 °C) (Temporal)     BP Readings from Last 3 Encounters:   03/08/24 135/85   01/30/24 126/88   11/02/23 130/85     Pulse Readings from Last 3 Encounters:   03/08/24 108   01/30/24 93   11/02/23 98       /85 (BP Location: Right arm, Patient Position: Sitting, BP Method: Small (Automatic))   Pulse 108   Wt (!) 142.7 kg (314 lb 9.5 oz)   SpO2 97%   BMI 42.67 kg/m²     Objective:   Physical Exam  Vitals and nursing note reviewed.   Constitutional:       General: He is not in acute distress.     Appearance: He is well-developed. He is obese. He is not diaphoretic.   HENT:      Head: Normocephalic and atraumatic.      Nose: Nose normal.   Eyes:      General: No scleral icterus.     Conjunctiva/sclera: Conjunctivae normal.   Neck:      Thyroid: No thyromegaly.      Vascular: No JVD.   Cardiovascular:      Rate and Rhythm: Normal rate and regular rhythm.      Heart sounds: S1 normal and S2 normal. No murmur heard.     No friction rub. No gallop. No S3 or S4  sounds.   Pulmonary:      Effort: Pulmonary effort is normal. No respiratory distress.      Breath sounds: Normal breath sounds. No stridor. No wheezing or rales.   Chest:      Chest wall: No tenderness.   Abdominal:      General: Bowel sounds are normal. There is no distension.      Palpations: Abdomen is soft. There is no mass.      Tenderness: There is no abdominal tenderness. There is no rebound.   Genitourinary:     Comments: Deferred  Musculoskeletal:         General: No tenderness or deformity. Normal range of motion.      Cervical back: Normal range of motion and neck supple.   Lymphadenopathy:      Cervical: No cervical adenopathy.   Skin:     General: Skin is warm and dry.      Coloration: Skin is not pale.      Findings: No erythema or rash.   Neurological:      Mental Status: He is alert and oriented to person, place, and time.      Motor: No abnormal muscle tone.      Coordination: Coordination normal.   Psychiatric:         Behavior: Behavior normal.         Thought Content: Thought content normal.         Judgment: Judgment normal.         Lab Results   Component Value Date     02/19/2024    K 3.9 02/19/2024     02/19/2024    CO2 25 02/19/2024    BUN 12 02/19/2024    CREATININE 0.9 02/19/2024    GLU 87 02/19/2024    HGBA1C 5.7 (H) 02/19/2024    AST 20 02/19/2024    ALT 18 02/19/2024    ALBUMIN 3.6 02/19/2024    PROT 7.7 02/19/2024    BILITOT 1.0 02/19/2024    WBC 5.99 02/19/2024    HGB 14.2 02/19/2024    HCT 42.8 02/19/2024    MCV 91 02/19/2024     02/19/2024    TSH 0.717 02/19/2024    CHOL 192 02/19/2024    HDL 44 02/19/2024    LDLCALC 132.6 02/19/2024    TRIG 77 02/19/2024     Assessment:      1. Type 2 diabetes mellitus with other specified complication, unspecified whether long term insulin use    2. Other hyperlipidemia    3. LOLLY (obstructive sleep apnea)    4. Primary hypertension    5. Nonspecific abnormal electrocardiogram (ECG) (EKG)            Plan:     Abnormal ECG -  inferior Q waves  - ECHO 4/2023 with normal bi V function, mild TR, PASP 25 mmHg. ECG stress test 4/2023 neg for ischemia.   - will discuss if further testing needed    2. HTN  - titrate meds    3. HLD  - needs improvement   - cont low miri diet     4. LOLLY, moderate  - cont CPAP    5. DM2 A1c 7.2 --> 5.7  - cont tx, needs improvement , not on meds now  - diet controlled   -cont Ozempic 2mg    6. Obesity, BMI 45 -->  BMI 44 - 327 lbs. --> 314 lbs   - cont weight loss     Thank you for allowing me to participate in this patient's care. Please do not hesitate to contact me with any questions or concerns. Consult note has been forwarded to the referral physician.

## 2024-03-08 ENCOUNTER — OFFICE VISIT (OUTPATIENT)
Dept: CARDIOLOGY | Facility: CLINIC | Age: 44
End: 2024-03-08
Payer: COMMERCIAL

## 2024-03-08 VITALS
BODY MASS INDEX: 42.67 KG/M2 | DIASTOLIC BLOOD PRESSURE: 85 MMHG | OXYGEN SATURATION: 97 % | SYSTOLIC BLOOD PRESSURE: 135 MMHG | WEIGHT: 314.63 LBS | HEART RATE: 108 BPM

## 2024-03-08 DIAGNOSIS — I10 PRIMARY HYPERTENSION: ICD-10-CM

## 2024-03-08 DIAGNOSIS — R94.31 NONSPECIFIC ABNORMAL ELECTROCARDIOGRAM (ECG) (EKG): ICD-10-CM

## 2024-03-08 DIAGNOSIS — G47.33 OSA (OBSTRUCTIVE SLEEP APNEA): ICD-10-CM

## 2024-03-08 DIAGNOSIS — E11.69 TYPE 2 DIABETES MELLITUS WITH OTHER SPECIFIED COMPLICATION, UNSPECIFIED WHETHER LONG TERM INSULIN USE: Primary | ICD-10-CM

## 2024-03-08 DIAGNOSIS — E78.49 OTHER HYPERLIPIDEMIA: ICD-10-CM

## 2024-03-08 PROCEDURE — 99999 PR PBB SHADOW E&M-EST. PATIENT-LVL IV: CPT | Mod: PBBFAC,,, | Performed by: INTERNAL MEDICINE

## 2024-03-08 PROCEDURE — 3008F BODY MASS INDEX DOCD: CPT | Mod: CPTII,S$GLB,, | Performed by: INTERNAL MEDICINE

## 2024-03-08 PROCEDURE — 3044F HG A1C LEVEL LT 7.0%: CPT | Mod: CPTII,S$GLB,, | Performed by: INTERNAL MEDICINE

## 2024-03-08 PROCEDURE — 3075F SYST BP GE 130 - 139MM HG: CPT | Mod: CPTII,S$GLB,, | Performed by: INTERNAL MEDICINE

## 2024-03-08 PROCEDURE — 3079F DIAST BP 80-89 MM HG: CPT | Mod: CPTII,S$GLB,, | Performed by: INTERNAL MEDICINE

## 2024-03-08 PROCEDURE — 99214 OFFICE O/P EST MOD 30 MIN: CPT | Mod: S$GLB,,, | Performed by: INTERNAL MEDICINE

## 2024-03-08 PROCEDURE — 1159F MED LIST DOCD IN RCRD: CPT | Mod: CPTII,S$GLB,, | Performed by: INTERNAL MEDICINE

## 2024-03-08 PROCEDURE — 1160F RVW MEDS BY RX/DR IN RCRD: CPT | Mod: CPTII,S$GLB,, | Performed by: INTERNAL MEDICINE

## 2024-03-08 RX ORDER — SEMAGLUTIDE 2.68 MG/ML
2 INJECTION, SOLUTION SUBCUTANEOUS
Qty: 6 ML | Refills: 1 | Status: SHIPPED | OUTPATIENT
Start: 2024-03-08 | End: 2025-03-08

## 2024-06-17 ENCOUNTER — TELEPHONE (OUTPATIENT)
Dept: OPHTHALMOLOGY | Facility: CLINIC | Age: 44
End: 2024-06-17
Payer: COMMERCIAL

## 2024-06-17 NOTE — TELEPHONE ENCOUNTER
Called patient and left vm in regards to scheduling appointment with Dr. Bazzi. Told patient he does currently have an appointment scheduled in September but told him if he would like a sooner appointment to call us back and let us know.

## 2024-06-24 ENCOUNTER — OFFICE VISIT (OUTPATIENT)
Dept: OPHTHALMOLOGY | Facility: CLINIC | Age: 44
End: 2024-06-24
Payer: COMMERCIAL

## 2024-06-24 DIAGNOSIS — E11.9 DIABETES MELLITUS TYPE 2 WITHOUT RETINOPATHY: Primary | ICD-10-CM

## 2024-06-24 DIAGNOSIS — H52.7 REFRACTIVE ERROR: ICD-10-CM

## 2024-06-24 PROCEDURE — 3044F HG A1C LEVEL LT 7.0%: CPT | Mod: CPTII,S$GLB,, | Performed by: OPHTHALMOLOGY

## 2024-06-24 PROCEDURE — 99999 PR PBB SHADOW E&M-EST. PATIENT-LVL III: CPT | Mod: PBBFAC,,, | Performed by: OPHTHALMOLOGY

## 2024-06-24 PROCEDURE — 1160F RVW MEDS BY RX/DR IN RCRD: CPT | Mod: CPTII,S$GLB,, | Performed by: OPHTHALMOLOGY

## 2024-06-24 PROCEDURE — 1159F MED LIST DOCD IN RCRD: CPT | Mod: CPTII,S$GLB,, | Performed by: OPHTHALMOLOGY

## 2024-06-24 PROCEDURE — 2023F DILAT RTA XM W/O RTNOPTHY: CPT | Mod: CPTII,S$GLB,, | Performed by: OPHTHALMOLOGY

## 2024-06-24 PROCEDURE — 92004 COMPRE OPH EXAM NEW PT 1/>: CPT | Mod: S$GLB,,, | Performed by: OPHTHALMOLOGY

## 2024-06-24 PROCEDURE — 92015 DETERMINE REFRACTIVE STATE: CPT | Mod: S$GLB,,, | Performed by: OPHTHALMOLOGY

## 2024-06-24 NOTE — PROGRESS NOTES
HPI    Pt states he has trouble seeing clearly at a distance. Was supposed to get   glasses at his last visit, but did not get them.    1. DM  2. +htn     Last edited by Gato Zhao MA on 6/24/2024 10:19 AM.            Assessment /Plan     For exam results, see Encounter Report.    Diabetes mellitus type 2 without retinopathy  Last A1c 5.7 . Diabetes controlled with no diabetic retinopathy on dilated exam. Reviewed diabetic eye precautions including excellent blood sugar control, and importance of regular follow up.      Refractive error  Mrx provided    RTC 1 sooner if needed

## 2024-07-29 ENCOUNTER — PATIENT MESSAGE (OUTPATIENT)
Dept: DERMATOLOGY | Facility: CLINIC | Age: 44
End: 2024-07-29
Payer: COMMERCIAL

## 2024-08-08 DIAGNOSIS — L20.89 OTHER ATOPIC DERMATITIS: ICD-10-CM

## 2024-08-08 DIAGNOSIS — Z79.899 ENCOUNTER FOR LONG-TERM (CURRENT) USE OF MEDICATIONS: ICD-10-CM

## 2024-08-12 DIAGNOSIS — E11.69 TYPE 2 DIABETES MELLITUS WITH OTHER SPECIFIED COMPLICATION, UNSPECIFIED WHETHER LONG TERM INSULIN USE: Primary | ICD-10-CM

## 2024-08-12 DIAGNOSIS — E78.49 OTHER HYPERLIPIDEMIA: ICD-10-CM

## 2024-08-12 RX ORDER — DUPILUMAB 300 MG/2ML
INJECTION, SOLUTION SUBCUTANEOUS
Qty: 4 EACH | Refills: 4 | Status: SHIPPED | OUTPATIENT
Start: 2024-08-12

## 2024-08-13 ENCOUNTER — TELEPHONE (OUTPATIENT)
Dept: DERMATOLOGY | Facility: CLINIC | Age: 44
End: 2024-08-13
Payer: COMMERCIAL

## 2024-08-13 NOTE — TELEPHONE ENCOUNTER
Called pt regarding scheduling an appointment. Pt successfully scheduled. Pt verbalized understanding.

## 2024-08-23 ENCOUNTER — HOSPITAL ENCOUNTER (OUTPATIENT)
Dept: RADIOLOGY | Facility: HOSPITAL | Age: 44
Discharge: HOME | End: 2024-08-23
Attending: INTERNAL MEDICINE
Payer: COMMERCIAL

## 2024-08-23 ENCOUNTER — OFFICE VISIT (OUTPATIENT)
Dept: CARDIOLOGY | Facility: CLINIC | Age: 44
End: 2024-08-23
Payer: COMMERCIAL

## 2024-08-23 ENCOUNTER — HOSPITAL ENCOUNTER (OUTPATIENT)
Dept: CARDIOLOGY | Facility: HOSPITAL | Age: 44
Discharge: HOME | End: 2024-08-23
Attending: INTERNAL MEDICINE
Payer: COMMERCIAL

## 2024-08-23 VITALS
BODY MASS INDEX: 42.58 KG/M2 | OXYGEN SATURATION: 99 % | SYSTOLIC BLOOD PRESSURE: 118 MMHG | HEIGHT: 72 IN | WEIGHT: 314.38 LBS | HEART RATE: 100 BPM | DIASTOLIC BLOOD PRESSURE: 88 MMHG

## 2024-08-23 DIAGNOSIS — R07.9 CHEST PAIN, UNSPECIFIED TYPE: ICD-10-CM

## 2024-08-23 DIAGNOSIS — E66.01 SEVERE OBESITY (BMI >= 40): ICD-10-CM

## 2024-08-23 DIAGNOSIS — E11.69 TYPE 2 DIABETES MELLITUS WITH OTHER SPECIFIED COMPLICATION, UNSPECIFIED WHETHER LONG TERM INSULIN USE: ICD-10-CM

## 2024-08-23 DIAGNOSIS — R07.9 CHEST PAIN, UNSPECIFIED TYPE: Primary | ICD-10-CM

## 2024-08-23 DIAGNOSIS — E78.49 OTHER HYPERLIPIDEMIA: ICD-10-CM

## 2024-08-23 DIAGNOSIS — I10 PRIMARY HYPERTENSION: ICD-10-CM

## 2024-08-23 DIAGNOSIS — R94.31 NONSPECIFIC ABNORMAL ELECTROCARDIOGRAM (ECG) (EKG): ICD-10-CM

## 2024-08-23 DIAGNOSIS — G47.33 OSA (OBSTRUCTIVE SLEEP APNEA): ICD-10-CM

## 2024-08-23 LAB
OHS QRS DURATION: 106 MS
OHS QTC CALCULATION: 440 MS

## 2024-08-23 PROCEDURE — 71046 X-RAY EXAM CHEST 2 VIEWS: CPT | Mod: 26,,, | Performed by: RADIOLOGY

## 2024-08-23 PROCEDURE — 93010 ELECTROCARDIOGRAM REPORT: CPT | Mod: ,,, | Performed by: INTERNAL MEDICINE

## 2024-08-23 PROCEDURE — 71046 X-RAY EXAM CHEST 2 VIEWS: CPT | Mod: TC

## 2024-08-23 PROCEDURE — 99999 PR PBB SHADOW E&M-EST. PATIENT-LVL IV: CPT | Mod: PBBFAC,,, | Performed by: INTERNAL MEDICINE

## 2024-08-23 PROCEDURE — 93005 ELECTROCARDIOGRAM TRACING: CPT

## 2024-08-23 NOTE — PROGRESS NOTES
Subjective:   Patient ID:  Krish Ramos is a 44 y.o. male who presents for cardiac consult of Chest Pain (At night.)    Referring physician: Rohit Gomez MD   Reason for consult: abnormal ECG    HPI  The patient came in today for cardiac consult of Chest Pain (At night.)      Krish Ramos is a 44 y.o. male pt with HTN, HLD, DM2, obesity BMI > 40, LOLLY presents for follow up CV eval.         3/8/24  BP elevated 140s/90s. . BMI 45 - 314 lbs.   BP improved in office.     8/23/24  Pt has been having chest pain only at night, no nausea. No SOB.   Uses CPAP at night.     Patient has fairly good exercise tolerance. Works for remotely for [employer]    Patient is compliant with medications.    FH - father - HTN, DM - 76    Results for orders placed during the hospital encounter of 04/21/23    Echo    Interpretation Summary  · The left ventricle is normal in size with mild concentric hypertrophy and normal systolic function.  · The estimated ejection fraction is 65%.  · Normal left ventricular diastolic function.  · Normal right ventricular size with normal right ventricular systolic function.  · Mild tricuspid regurgitation.  · Intermediate central venous pressure (8 mmHg).  · The estimated PA systolic pressure is 25 mmHg.      Results for orders placed during the hospital encounter of 04/21/23    Exercise Stress - EKG    Interpretation Summary    The ECG portion of the study is negative for ischemia.    The patient reported no chest pain during the stress test.    The blood pressure response to stress was normal.    The exercise capacity was above average.    The patient exercised for 6 minutes 0 seconds on a Felipe protocol, corresponding to a functional capacity of 7 METS, achieving a peak heart rate of 171 bpm, which is 96 % of the age predicted maximum heart rate. Their exercise capacity was above average.      ECG  Normal sinus rhythm   Inferior infarct ,age undetermined    Abnormal ECG   No previous ECGs available   Confirmed by MANJINDER ALANIZ MD ([id number]) on 3/10/2023 11:45:51 AM     Referred By: MONTEZ DEL RIO           Confirmed By:MANJINDER ALANIZ MD    Past Medical History:   Diagnosis Date    Diabetes mellitus     Eczema     HLD (hyperlipidemia)     Primary hypertension        Past Surgical History:   Procedure Laterality Date    FEMUR FRACTURE SURGERY Right        Social History     Tobacco Use    Smoking status: Never    Smokeless tobacco: Never   Substance Use Topics    Alcohol use: Not Currently    Drug use: Never       No family history on file.    Patient's Medications   New Prescriptions    No medications on file   Previous Medications    BLOOD SUGAR DIAGNOSTIC STRP    Use with glucometer daily as needed. Please send accu-chek dawit strips.    DUPIXENT SYRINGE 300 MG/2 ML SYRG    INJECT 1 SYRINGE UNDER THE SKIN EVERY 14 DAYS    EPINEPHRINE (EPIPEN) 0.3 MG/0.3 ML ATIN    Inject 0.3 mLs (0.3 mg total) into the muscle once. for 1 dose    LANCETS (ACCU-CHEK SOFTCLIX LANCETS) MISC    1 each by Misc.(Non-Drug; Combo Route) route 2 (two) times a day.    OLMESARTAN-AMLODIPIN-HCTHIAZID 40-10-25 MG TAB    Take 1 tablet by mouth once daily.    SEMAGLUTIDE (OZEMPIC) 2 MG/DOSE (8 MG/3 ML) PNIJ    Inject 2 mg into the skin every 7 days.    TACROLIMUS (PROTOPIC) 0.1 % OINTMENT    Apply topically 2 (two) times daily. Non-steroid. Safe to use long-term    TIZANIDINE (ZANAFLEX) 4 MG TABLET    Take 4 mg by mouth nightly.    TRIAMCINOLONE ACETONIDE 0.1% (KENALOG) 0.1 % OINTMENT    AAA bid prn. Do not use on face, underarms or groin.   Modified Medications    No medications on file   Discontinued Medications    No medications on file       Review of Systems   Constitutional: Negative.    HENT: Negative.     Eyes: Negative.    Respiratory: Negative.     Cardiovascular: Negative.    Gastrointestinal: Negative.    Genitourinary: Negative.    Musculoskeletal: Negative.    Skin: Negative.     Neurological: Negative.    Endo/Heme/Allergies: Negative.    Psychiatric/Behavioral: Negative.     All 12 systems otherwise negative.      Wt Readings from Last 3 Encounters:   08/23/24 (!) 142.6 kg (314 lb 6 oz)   03/08/24 (!) 142.7 kg (314 lb 9.5 oz)   01/30/24 (!) 144.2 kg (317 lb 14.5 oz)     Temp Readings from Last 3 Encounters:   01/30/24 99.2 °F (37.3 °C) (Tympanic)   09/20/21 98.1 °F (36.7 °C) (Temporal)     BP Readings from Last 3 Encounters:   08/23/24 118/88   03/08/24 135/85   01/30/24 126/88     Pulse Readings from Last 3 Encounters:   08/23/24 100   03/08/24 108   01/30/24 93       /88 (BP Location: Left arm, Patient Position: Sitting, BP Method: Large (Manual))   Pulse 100   Ht 6' (1.829 m)   Wt (!) 142.6 kg (314 lb 6 oz)   SpO2 99%   BMI 42.64 kg/m²     Objective:   Physical Exam  Vitals and nursing note reviewed.   Constitutional:       General: He is not in acute distress.     Appearance: He is well-developed. He is obese. He is not diaphoretic.   HENT:      Head: Normocephalic and atraumatic.      Nose: Nose normal.   Eyes:      General: No scleral icterus.     Conjunctiva/sclera: Conjunctivae normal.   Neck:      Thyroid: No thyromegaly.      Vascular: No JVD.   Cardiovascular:      Rate and Rhythm: Normal rate and regular rhythm.      Heart sounds: S1 normal and S2 normal. No murmur heard.     No friction rub. No gallop. No S3 or S4 sounds.   Pulmonary:      Effort: Pulmonary effort is normal. No respiratory distress.      Breath sounds: Normal breath sounds. No stridor. No wheezing or rales.   Chest:      Chest wall: No tenderness.   Abdominal:      General: Bowel sounds are normal. There is no distension.      Palpations: Abdomen is soft. There is no mass.      Tenderness: There is no abdominal tenderness. There is no rebound.   Genitourinary:     Comments: Deferred  Musculoskeletal:         General: No tenderness or deformity. Normal range of motion.      Cervical back: Normal  range of motion and neck supple.   Lymphadenopathy:      Cervical: No cervical adenopathy.   Skin:     General: Skin is warm and dry.      Coloration: Skin is not pale.      Findings: No erythema or rash.   Neurological:      Mental Status: He is alert and oriented to person, place, and time.      Motor: No abnormal muscle tone.      Coordination: Coordination normal.   Psychiatric:         Behavior: Behavior normal.         Thought Content: Thought content normal.         Judgment: Judgment normal.       Lab Results   Component Value Date     02/19/2024    K 3.9 02/19/2024     02/19/2024    CO2 25 02/19/2024    BUN 12 02/19/2024    CREATININE 0.9 02/19/2024    GLU 87 02/19/2024    HGBA1C 5.7 (H) 02/19/2024    AST 20 02/19/2024    ALT 18 02/19/2024    ALBUMIN 3.6 02/19/2024    PROT 7.7 02/19/2024    BILITOT 1.0 02/19/2024    WBC 5.99 02/19/2024    HGB 14.2 02/19/2024    HCT 42.8 02/19/2024    MCV 91 02/19/2024     02/19/2024    TSH 0.717 02/19/2024    CHOL 192 02/19/2024    HDL 44 02/19/2024    LDLCALC 132.6 02/19/2024    TRIG 77 02/19/2024     Assessment:      1. Chest pain, unspecified type    2. Severe obesity (BMI >= 40)    3. LOLLY (obstructive sleep apnea)    4. Primary hypertension    5. Type 2 diabetes mellitus with other specified complication, unspecified whether long term insulin use    6. Nonspecific abnormal electrocardiogram (ECG) (EKG)    7. Other hyperlipidemia            Plan:     Abnormal ECG - inferior Q waves with CP more at night  - ECHO 4/2023 with normal bi V function, mild TR, PASP 25 mmHg. ECG stress test 4/2023 neg for ischemia.   -order pharm nuclear stress test, pt cannot walk on treadmill due  - order ECHO  - r/o non cardiac etiologies as well; order CRP, Sed rate    2. HTN  - titrate meds    3. HLD  - needs improvement   - cont low miri diet     4. LOLLY, moderate  - cont CPAP    5. DM2 A1c 7.2 --> 5.7  - cont tx, needs improvement , not on meds now  - diet controlled    -cont Ozempic 2mg    6. Obesity, BMI 45 -->  BMI 44 - 327 lbs. --> 314 lbs   - cont weight loss     Visit today included increased complexity associated with the care of the episodic problem chest pain addressed and managing the longitudinal care of the patient due to the serious and/or complex managed problem(s) .      Thank you for allowing me to participate in this patient's care. Please do not hesitate to contact me with any questions or concerns. Consult note has been forwarded to the referral physician.

## 2024-08-26 ENCOUNTER — TELEPHONE (OUTPATIENT)
Dept: CARDIOLOGY | Facility: CLINIC | Age: 44
End: 2024-08-26
Payer: COMMERCIAL

## 2024-08-26 ENCOUNTER — PATIENT MESSAGE (OUTPATIENT)
Dept: PULMONOLOGY | Facility: CLINIC | Age: 44
End: 2024-08-26
Payer: COMMERCIAL

## 2024-08-26 NOTE — TELEPHONE ENCOUNTER
Called and spoke to pt. Results verbalized and understood by pt    ----- Message from Jerry Frank MD sent at 8/23/2024  5:05 PM CDT -----  Please contact the patient and let them know that their results of labs reveal mildly elevated Sed rate due to inflammation can take Ibuprofen or high dose aspirin every 8 hours to treat inflammation for next 3-4 days, if having reflux issues can take Pepcid as well daily with that.

## 2024-09-13 ENCOUNTER — OFFICE VISIT (OUTPATIENT)
Dept: PULMONOLOGY | Facility: CLINIC | Age: 44
End: 2024-09-13
Payer: COMMERCIAL

## 2024-09-13 VITALS
HEART RATE: 86 BPM | HEIGHT: 72 IN | BODY MASS INDEX: 42.35 KG/M2 | OXYGEN SATURATION: 99 % | DIASTOLIC BLOOD PRESSURE: 80 MMHG | RESPIRATION RATE: 16 BRPM | WEIGHT: 312.63 LBS | SYSTOLIC BLOOD PRESSURE: 121 MMHG

## 2024-09-13 DIAGNOSIS — G47.33 OSA ON CPAP: Primary | ICD-10-CM

## 2024-09-13 DIAGNOSIS — I10 PRIMARY HYPERTENSION: ICD-10-CM

## 2024-09-13 DIAGNOSIS — E66.01 MORBID OBESITY: ICD-10-CM

## 2024-09-13 PROCEDURE — 99999 PR PBB SHADOW E&M-EST. PATIENT-LVL IV: CPT | Mod: PBBFAC,,, | Performed by: PHYSICIAN ASSISTANT

## 2024-09-13 NOTE — PROGRESS NOTES
Subjective:       Patient ID: Krish Ramos is a 44 y.o. male.    Chief Complaint: Sleep Apnea      9/13/2024  Established patient here for LOLLY on CPAP follow up  Compliance download reviewed, days with usage > 4 hours is 99%, average AHI is 1.0  Patient states improved symptoms with use of CPAP. Sleeping more soundly. Waking up feeling more refreshed. Improved daytime sleepiness.  Nasal mask, good fit, [facility]  He had a few days about 2 weeks ago woke up with chest pain after CPAP usage, pain would subside after about 30 minutes waking up  He says this has resolved, has not had any pain in the last 2 weeks  Denies cough, SOB, fever, or chest pain        9/13/2024     6:54 AM   EPWORTH SLEEPINESS SCALE   Sitting and reading 0   Watching TV 1   Sitting, inactive in a public place (e.g. a theatre or a meeting) 0   As a passenger in a car for an hour without a break 1   Lying down to rest in the afternoon when circumstances permit 1   Sitting and talking to someone 0   Sitting quietly after a lunch without alcohol 0   In a car, while stopped for a few minutes in traffic 0   Total score 3       Immunization History   Administered Date(s) Administered    COVID-19, MRNA, LN-S, PF (Pfizer) (Purple Cap) 03/20/2021, 04/10/2021, 11/03/2021    Influenza 03/03/2022    Influenza - Quadrivalent - PF *Preferred* (6 months and older) 12/02/2022    Pneumococcal Conjugate - 20 Valent 03/10/2023      Tobacco Use: Low Risk  (9/13/2024)    Patient History     Smoking Tobacco Use: Never     Smokeless Tobacco Use: Never     Passive Exposure: Not on file      Past Medical History:   Diagnosis Date    Diabetes mellitus     Eczema     HLD (hyperlipidemia)     Primary hypertension       Current Outpatient Medications on File Prior to Visit   Medication Sig Dispense Refill    blood sugar diagnostic Strp Use with glucometer daily as needed. Please send accu-chek dawit strips. 100 strip 2    DUPIXENT SYRINGE 300 mg/2 mL Syrg INJECT 1 SYRINGE  UNDER THE SKIN EVERY 14 DAYS 4 each 4    EPINEPHrine (EPIPEN) 0.3 mg/0.3 mL AtIn Inject 0.3 mLs (0.3 mg total) into the muscle once. for 1 dose 2 Device 3    lancets (ACCU-CHEK SOFTCLIX LANCETS) Misc 1 each by Misc.(Non-Drug; Combo Route) route 2 (two) times a day. 100 each 3    olmesartan-amLODIPin-hcthiazid 40-10-25 mg Tab Take 1 tablet by mouth once daily. 90 tablet 3    semaglutide (OZEMPIC) 2 mg/dose (8 mg/3 mL) PnIj Inject 2 mg into the skin every 7 days. 6 mL 1    tacrolimus (PROTOPIC) 0.1 % ointment Apply topically 2 (two) times daily. Non-steroid. Safe to use long-term 60 g 5    triamcinolone acetonide 0.1% (KENALOG) 0.1 % ointment AAA bid prn. Do not use on face, underarms or groin. 80 g 3    tiZANidine (ZANAFLEX) 4 MG tablet Take 4 mg by mouth nightly.       No current facility-administered medications on file prior to visit.        Review of Systems   Constitutional:  Negative for fever, weight loss, appetite change, fatigue and weakness.   HENT:  Negative for postnasal drip, rhinorrhea, sinus pressure, trouble swallowing and congestion.    Respiratory:  Negative for cough, sputum production, choking, chest tightness, shortness of breath, wheezing and dyspnea on extertion.    Cardiovascular:  Negative for chest pain and leg swelling.   Musculoskeletal:  Negative for arthralgias, gait problem and joint swelling.   Gastrointestinal:  Negative for nausea, vomiting and abdominal pain.   Neurological:  Negative for dizziness, weakness and headaches.   All other systems reviewed and are negative.      Objective:       Vitals:    09/13/24 1028   BP: 121/80   Pulse: 86   Resp: 16   SpO2: 99%   Weight: (!) 141.8 kg (312 lb 9.8 oz)   Height: 6' (1.829 m)       Physical Exam   Constitutional: He is oriented to person, place, and time. He appears well-developed and well-nourished. No distress.   HENT:   Head: Normocephalic.   Mouth/Throat: Oropharynx is clear and moist.   Cardiovascular: Normal rate and regular  rhythm.   Pulmonary/Chest: Effort normal. No respiratory distress. He has no wheezes. He has no rhonchi. He has no rales.   Musculoskeletal:         General: No edema.      Cervical back: Normal range of motion and neck supple.   Neurological: He is alert and oriented to person, place, and time. Gait normal.   Skin: Skin is warm and dry.   Psychiatric: He has a normal mood and affect.   Vitals reviewed.    Personal Diagnostic Review    X-Ray Chest PA And Lateral  Narrative: EXAM:   XR CHEST PA AND LATERAL    CLINICAL HISTORY: Chest pain    COMPARISON: 03/10/2023.    TECHNIQUE: PA and lateral views    FINDINGS:  The lungs are clear.   No pneumothorax.  The cardiac silhouette size and contour is within normal limits.  Impression: Negative chest radiograph.    Finalized on: 8/23/2024 10:50 AM By:  DUDLEY Durham MD, MD  [facility]# [id number]      2024-08-23 10:52:[id number]    [facility]            Assessment/Plan:         Problem List Items Addressed This Visit          Cardiac/Vascular    Primary hypertension     Stable, continue current medication management             Endocrine    Morbid obesity     Weight loss and exercise to improve overall health.              Other    LOLLY on CPAP - Primary     Compliant with CPAP and benefits from use  New supply order today, nasal mask  Discussed therapeutic goals for CPAP: Ideal usage 100% of nights for 6-8 hours per night. Minimum usage is 70% of night for at least 4 hours per night.    If pain returns, recommend lowering pressure setting on CPAP  ER for any worsening pain           Relevant Orders    CPAP/BIPAP SUPPLIES       Follow up in about 1 year (around 9/13/2025) for LOLYL follow up.    Discussed diagnosis, its evaluation, treatment and usual course. All questions answered.    Patient verbalized understanding of plan and left in no acute distress    Thank you for the courtesy of participating in the care of this patient    Jesenia Perez PA-C  [facility]

## 2024-09-13 NOTE — ASSESSMENT & PLAN NOTE
Compliant with CPAP and benefits from use  New supply order today, nasal mask  Discussed therapeutic goals for CPAP: Ideal usage 100% of nights for 6-8 hours per night. Minimum usage is 70% of night for at least 4 hours per night.    If pain returns, recommend lowering pressure setting on CPAP  ER for any worsening pain

## 2024-09-17 ENCOUNTER — HOSPITAL ENCOUNTER (OUTPATIENT)
Dept: RADIOLOGY | Facility: HOSPITAL | Age: 44
Discharge: HOME | End: 2024-09-17
Attending: INTERNAL MEDICINE
Payer: COMMERCIAL

## 2024-09-17 ENCOUNTER — HOSPITAL ENCOUNTER (OUTPATIENT)
Dept: CARDIOLOGY | Facility: HOSPITAL | Age: 44
Discharge: HOME | End: 2024-09-17
Attending: INTERNAL MEDICINE
Payer: COMMERCIAL

## 2024-09-17 VITALS
DIASTOLIC BLOOD PRESSURE: 80 MMHG | SYSTOLIC BLOOD PRESSURE: 121 MMHG | BODY MASS INDEX: 42.26 KG/M2 | WEIGHT: 312 LBS | HEIGHT: 72 IN

## 2024-09-17 DIAGNOSIS — I10 PRIMARY HYPERTENSION: ICD-10-CM

## 2024-09-17 DIAGNOSIS — G47.33 OSA (OBSTRUCTIVE SLEEP APNEA): ICD-10-CM

## 2024-09-17 DIAGNOSIS — R07.9 CHEST PAIN, UNSPECIFIED TYPE: ICD-10-CM

## 2024-09-17 DIAGNOSIS — E78.49 OTHER HYPERLIPIDEMIA: ICD-10-CM

## 2024-09-17 DIAGNOSIS — E66.01 SEVERE OBESITY (BMI >= 40): ICD-10-CM

## 2024-09-17 DIAGNOSIS — E11.69 TYPE 2 DIABETES MELLITUS WITH OTHER SPECIFIED COMPLICATION, UNSPECIFIED WHETHER LONG TERM INSULIN USE: ICD-10-CM

## 2024-09-17 DIAGNOSIS — R94.31 NONSPECIFIC ABNORMAL ELECTROCARDIOGRAM (ECG) (EKG): ICD-10-CM

## 2024-09-17 LAB
AORTIC ROOT ANNULUS: 3.38 CM
ASCENDING AORTA: 3.1 CM
AV INDEX (PROSTH): 0.91
AV MEAN GRADIENT: 3 MMHG
AV PEAK GRADIENT: 5 MMHG
AV VALVE AREA BY VELOCITY RATIO: 3.15 CM²
AV VALVE AREA: 3.26 CM²
AV VELOCITY RATIO: 0.88
BSA FOR ECHO PROCEDURE: 2.68 M2
CV ECHO LV RWT: 0.49 CM
CV STRESS BASE HR: 85 BPM
DIASTOLIC BLOOD PRESSURE: 82 MMHG
DOP CALC AO PEAK VEL: 1.12 M/S
DOP CALC AO VTI: 19.9 CM
DOP CALC LVOT AREA: 3.6 CM2
DOP CALC LVOT DIAMETER: 2.13 CM
DOP CALC LVOT PEAK VEL: 0.99 M/S
DOP CALC LVOT STROKE VOLUME: 64.82 CM3
DOP CALC RVOT PEAK VEL: 0.8 M/S
DOP CALC RVOT VTI: 17.9 CM
DOP CALCLVOT PEAK VEL VTI: 18.2 CM
E WAVE DECELERATION TIME: 251.82 MSEC
E/A RATIO: 1.07
E/E' RATIO: 6.78 M/S
ECHO LV POSTERIOR WALL: 1.19 CM (ref 0.6–1.1)
FRACTIONAL SHORTENING: 33 % (ref 28–44)
INTERVENTRICULAR SEPTUM: 1.16 CM (ref 0.6–1.1)
IVC DIAMETER: 1.97 CM
IVRT: 98.95 MSEC
LA MAJOR: 4.86 CM
LA MINOR: 5.04 CM
LA WIDTH: 4 CM
LEFT ATRIUM AREA SYSTOLIC (APICAL 2 CHAMBER): 21.11 CM2
LEFT ATRIUM AREA SYSTOLIC (APICAL 4 CHAMBER): 22.24 CM2
LEFT ATRIUM SIZE: 4 CM
LEFT ATRIUM VOLUME INDEX MOD: 27.3 ML/M2
LEFT ATRIUM VOLUME INDEX: 26.2 ML/M2
LEFT ATRIUM VOLUME MOD: 70.14 CM3
LEFT ATRIUM VOLUME: 67.3 CM3
LEFT INTERNAL DIMENSION IN SYSTOLE: 3.29 CM (ref 2.1–4)
LEFT VENTRICLE DIASTOLIC VOLUME INDEX: 43.87 ML/M2
LEFT VENTRICLE DIASTOLIC VOLUME: 112.74 ML
LEFT VENTRICLE END SYSTOLIC VOLUME APICAL 2 CHAMBER: 68.42 ML
LEFT VENTRICLE END SYSTOLIC VOLUME APICAL 4 CHAMBER: 67.92 ML
LEFT VENTRICLE MASS INDEX: 86 G/M2
LEFT VENTRICLE SYSTOLIC VOLUME INDEX: 17.1 ML/M2
LEFT VENTRICLE SYSTOLIC VOLUME: 43.94 ML
LEFT VENTRICULAR INTERNAL DIMENSION IN DIASTOLE: 4.9 CM (ref 3.5–6)
LEFT VENTRICULAR MASS: 219.77 G
LV LATERAL E/E' RATIO: 6 M/S
LV SEPTAL E/E' RATIO: 7.8 M/S
LVED V (TEICH): 112.74 ML
LVES V (TEICH): 43.94 ML
LVOT MG: 2.05 MMHG
LVOT MV: 0.66 CM/S
MV PEAK A VEL: 0.73 M/S
MV PEAK E VEL: 0.78 M/S
NUC REST EJECTION FRACTION: 57
NUC STRESS EJECTION FRACTION: 61 %
OHS CV CPX 85 PERCENT MAX PREDICTED HEART RATE MALE: 150
OHS CV CPX ESTIMATED METS: 1
OHS CV CPX MAX PREDICTED HEART RATE: 177
OHS CV CPX PATIENT IS FEMALE: 0
OHS CV CPX PATIENT IS MALE: 1
OHS CV CPX PEAK DIASTOLIC BLOOD PRESSURE: 66 MMHG
OHS CV CPX PEAK HEAR RATE: 113 BPM
OHS CV CPX PEAK RATE PRESSURE PRODUCT: NORMAL
OHS CV CPX PEAK SYSTOLIC BLOOD PRESSURE: 117 MMHG
OHS CV CPX PERCENT MAX PREDICTED HEART RATE ACHIEVED: 64
OHS CV CPX RATE PRESSURE PRODUCT PRESENTING: NORMAL
OHS CV RV/LV RATIO: 0.57 CM
PV MEAN GRADIENT: 1 MMHG
PV PEAK GRADIENT: 3 MMHG
PV PEAK VELOCITY: 1.01 M/S
RA MAJOR: 4.64 CM
RA PRESSURE ESTIMATED: 3 MMHG
RA WIDTH: 3.58 CM
RIGHT VENTRICULAR END-DIASTOLIC DIMENSION: 2.81 CM
SINUS: 3.42 CM
STJ: 2.92 CM
SYSTOLIC BLOOD PRESSURE: 120 MMHG
TDI LATERAL: 0.13 M/S
TDI SEPTAL: 0.1 M/S
TDI: 0.12 M/S
TRICUSPID ANNULAR PLANE SYSTOLIC EXCURSION: 2.38 CM
Z-SCORE OF LEFT VENTRICULAR DIMENSION IN END DIASTOLE: -12.38
Z-SCORE OF LEFT VENTRICULAR DIMENSION IN END SYSTOLE: -8.75

## 2024-09-17 PROCEDURE — 93018 CV STRESS TEST I&R ONLY: CPT | Mod: ,,, | Performed by: INTERNAL MEDICINE

## 2024-09-17 PROCEDURE — 93017 CV STRESS TEST TRACING ONLY: CPT

## 2024-09-17 PROCEDURE — 78452 HT MUSCLE IMAGE SPECT MULT: CPT

## 2024-09-17 PROCEDURE — A9502 TC99M TETROFOSMIN: HCPCS | Performed by: INTERNAL MEDICINE

## 2024-09-17 PROCEDURE — 93306 TTE W/DOPPLER COMPLETE: CPT | Mod: 26,,, | Performed by: INTERNAL MEDICINE

## 2024-09-17 PROCEDURE — 93016 CV STRESS TEST SUPVJ ONLY: CPT | Mod: ,,, | Performed by: INTERNAL MEDICINE

## 2024-09-17 PROCEDURE — 93306 TTE W/DOPPLER COMPLETE: CPT

## 2024-09-17 PROCEDURE — 63600175 PHARM REV CODE 636 W HCPCS: Performed by: INTERNAL MEDICINE

## 2024-09-17 PROCEDURE — 78452 HT MUSCLE IMAGE SPECT MULT: CPT | Mod: 26,,, | Performed by: INTERNAL MEDICINE

## 2024-09-17 RX ORDER — REGADENOSON 0.08 MG/ML
0.4 INJECTION, SOLUTION INTRAVENOUS ONCE
Status: COMPLETED | OUTPATIENT
Start: 2024-09-17 | End: 2024-09-17

## 2024-09-17 RX ADMIN — REGADENOSON 0.4 MG: 0.08 INJECTION, SOLUTION INTRAVENOUS at 10:09

## 2024-09-17 RX ADMIN — TETROFOSMIN 28 MILLICURIE: 1.38 INJECTION, POWDER, LYOPHILIZED, FOR SOLUTION INTRAVENOUS at 10:09

## 2024-09-17 RX ADMIN — TETROFOSMIN 9.2 MILLICURIE: 1.38 INJECTION, POWDER, LYOPHILIZED, FOR SOLUTION INTRAVENOUS at 08:09

## 2024-10-08 ENCOUNTER — TELEPHONE (OUTPATIENT)
Dept: DERMATOLOGY | Facility: CLINIC | Age: 44
End: 2024-10-08
Payer: COMMERCIAL

## 2024-10-08 NOTE — TELEPHONE ENCOUNTER
Called pt regarding r/s appointment with Dr.kelli Rapp. On Nov 6. Pt successfuly r/s .pt verbalized understanding.

## 2024-10-14 ENCOUNTER — PATIENT MESSAGE (OUTPATIENT)
Dept: OPHTHALMOLOGY | Facility: CLINIC | Age: 44
End: 2024-10-14
Payer: COMMERCIAL

## 2024-10-17 ENCOUNTER — OFFICE VISIT (OUTPATIENT)
Dept: OPHTHALMOLOGY | Facility: CLINIC | Age: 44
End: 2024-10-17
Payer: COMMERCIAL

## 2024-10-17 DIAGNOSIS — H52.7 REFRACTIVE ERROR: Primary | ICD-10-CM

## 2024-10-17 PROCEDURE — 99999 PR PBB SHADOW E&M-EST. PATIENT-LVL II: CPT | Mod: PBBFAC,,, | Performed by: OPHTHALMOLOGY

## 2024-10-17 PROCEDURE — 99499 UNLISTED E&M SERVICE: CPT | Mod: S$GLB,,, | Performed by: OPHTHALMOLOGY

## 2024-10-17 RX ORDER — CELECOXIB 200 MG/1
200-400 CAPSULE ORAL DAILY PRN
COMMUNITY
Start: 2024-08-27

## 2024-10-17 NOTE — PROGRESS NOTES
HPI     Follow-up     Additional comments: Pt reports for f/u due to rx ck  Pt states he didn't previously wear eyeglasses but decided to fill in his   mrx of [employer] eyeglasses. Pt states he feels his prescription is too strong   and when he tries to read it causes some distortion and also when he tried   to walk the ground is much closer.   Pt denies any eye pain.  Pt denies any new ocular disturbances.            Comments    1. DM2   2. +htn             Last edited by Britta Gaviria on 10/17/2024  2:09 PM.            Assessment /Plan     For exam results, see Encounter Report.    Refractive error  New MRx dispensed    Return to clinic as scheduled

## 2024-10-17 NOTE — PROGRESS NOTES
HPI     Follow-up     Additional comments: Pt reports for f/u due to rx ck           Comments    1. DM2   2. +htn             Last edited by Britta Gaviria on 10/17/2024  1:40 PM.            Assessment /Plan     For exam results, see Encounter Report.    There are no diagnoses linked to this encounter.  ***

## 2024-10-21 DIAGNOSIS — M25.561 RIGHT KNEE PAIN, UNSPECIFIED CHRONICITY: Primary | ICD-10-CM

## 2024-10-24 ENCOUNTER — RESEARCH ENCOUNTER (OUTPATIENT)
Dept: RESEARCH | Facility: HOSPITAL | Age: 44
End: 2024-10-24
Payer: COMMERCIAL

## 2024-10-24 ENCOUNTER — OFFICE VISIT (OUTPATIENT)
Dept: ORTHOPEDICS | Facility: CLINIC | Age: 44
End: 2024-10-24
Payer: COMMERCIAL

## 2024-10-24 ENCOUNTER — HOSPITAL ENCOUNTER (OUTPATIENT)
Dept: RADIOLOGY | Facility: HOSPITAL | Age: 44
Discharge: HOME | End: 2024-10-24
Attending: ORTHOPAEDIC SURGERY
Payer: COMMERCIAL

## 2024-10-24 VITALS — WEIGHT: 311.94 LBS | HEIGHT: 72 IN | BODY MASS INDEX: 42.25 KG/M2

## 2024-10-24 DIAGNOSIS — M00.9 PYOGENIC ARTHRITIS OF RIGHT KNEE JOINT, DUE TO UNSPECIFIED ORGANISM: ICD-10-CM

## 2024-10-24 DIAGNOSIS — M17.31 POST-TRAUMATIC OSTEOARTHRITIS OF RIGHT KNEE: ICD-10-CM

## 2024-10-24 DIAGNOSIS — M17.11 PRIMARY OSTEOARTHRITIS OF RIGHT KNEE: Primary | ICD-10-CM

## 2024-10-24 DIAGNOSIS — G89.29 CHRONIC PAIN OF RIGHT KNEE: ICD-10-CM

## 2024-10-24 DIAGNOSIS — E66.01 MORBID OBESITY: ICD-10-CM

## 2024-10-24 DIAGNOSIS — M25.561 RIGHT KNEE PAIN, UNSPECIFIED CHRONICITY: ICD-10-CM

## 2024-10-24 DIAGNOSIS — M25.561 CHRONIC PAIN OF RIGHT KNEE: ICD-10-CM

## 2024-10-24 PROCEDURE — 3008F BODY MASS INDEX DOCD: CPT | Mod: CPTII,S$GLB,, | Performed by: ORTHOPAEDIC SURGERY

## 2024-10-24 PROCEDURE — 1159F MED LIST DOCD IN RCRD: CPT | Mod: CPTII,S$GLB,, | Performed by: ORTHOPAEDIC SURGERY

## 2024-10-24 PROCEDURE — 73564 X-RAY EXAM KNEE 4 OR MORE: CPT | Mod: TC,PN,RT

## 2024-10-24 PROCEDURE — 20610 DRAIN/INJ JOINT/BURSA W/O US: CPT | Mod: RT,S$GLB,, | Performed by: ORTHOPAEDIC SURGERY

## 2024-10-24 PROCEDURE — 99999 PR PBB SHADOW E&M-EST. PATIENT-LVL III: CPT | Mod: PBBFAC,,, | Performed by: ORTHOPAEDIC SURGERY

## 2024-10-24 PROCEDURE — 3044F HG A1C LEVEL LT 7.0%: CPT | Mod: CPTII,S$GLB,, | Performed by: ORTHOPAEDIC SURGERY

## 2024-10-24 PROCEDURE — 73564 X-RAY EXAM KNEE 4 OR MORE: CPT | Mod: 26,RT,, | Performed by: RADIOLOGY

## 2024-10-24 PROCEDURE — 99204 OFFICE O/P NEW MOD 45 MIN: CPT | Mod: 25,S$GLB,, | Performed by: ORTHOPAEDIC SURGERY

## 2024-10-24 RX ORDER — LIDOCAINE HYDROCHLORIDE 10 MG/ML
4 INJECTION, SOLUTION INFILTRATION; PERINEURAL
Status: DISCONTINUED | OUTPATIENT
Start: 2024-10-24 | End: 2024-10-24 | Stop reason: HOSPADM

## 2024-10-24 RX ORDER — BUPIVACAINE HYDROCHLORIDE 5 MG/ML
5 INJECTION, SOLUTION PERINEURAL
Status: DISCONTINUED | OUTPATIENT
Start: 2024-10-24 | End: 2024-10-24 | Stop reason: HOSPADM

## 2024-10-24 RX ORDER — KETOROLAC TROMETHAMINE 30 MG/ML
30 INJECTION, SOLUTION INTRAMUSCULAR; INTRAVENOUS
Status: DISCONTINUED | OUTPATIENT
Start: 2024-10-24 | End: 2024-10-24 | Stop reason: HOSPADM

## 2024-10-24 RX ADMIN — BUPIVACAINE HYDROCHLORIDE 5 ML: 5 INJECTION, SOLUTION PERINEURAL at 11:10

## 2024-10-24 RX ADMIN — LIDOCAINE HYDROCHLORIDE 4 ML: 10 INJECTION, SOLUTION INFILTRATION; PERINEURAL at 11:10

## 2024-10-24 RX ADMIN — KETOROLAC TROMETHAMINE 30 MG: 30 INJECTION, SOLUTION INTRAMUSCULAR; INTRAVENOUS at 11:10

## 2024-10-24 NOTE — PROGRESS NOTES
Protocol: innovations in Genicular Outcomes Registry (Rosa)  Protocol#: Rosa  IRB#: 2021.156  Version Date: 10-Smith-2023  PI: Lino Aldana MD  Patient Initials: [email or web address]     Eligibility Note:      - Inclusion Criteria  1. Planned to receive treatment for knee OA pain including, but not limited to, knee injections, nerve blocking  procedures, or knee arthroplasty within 60 days of screening yes, scheduled for Toradol injection in Right knee on 10/24/2024.  2. Able to understand the informed consent and the assessment questionnaires and have the ability to complete  them in the opinion of the investigator: yes  3. Have access to a smartphone or internet access with a computer/tablet to complete the questionnaires using  the registry application: yes  4. KL Score according to Dr. Aldana: 4    7.4 Exclusion Criteria  1. Actively enrolled in an investigational trial that would preclude patients from receiving the sites standard  of care for knee OA pain or knee arthroplasty recovery protocol: no   2. Planning to have a surgery other than on the target knee: no

## 2024-10-24 NOTE — PROGRESS NOTES
Protocol: innovations in Genicular Outcomes Registry (Rosa)  Protocol#: Rosa  IRB#: 2021.156  Version Date: 10-Smith-2023  PI: Lino Aldana MD  Patient Initials: AYESHA   (Study ID#: [id number])    Screening Note:    Patient reports the following information during screening visit.    Demographics:  male  Age: 44  Black or -American  Non-    Social Hx:  Marital Status:   Never smoker  Hx of Alcohol Abuse or dependency: No  Hx of Opioid abuse: No  Hx of Illicit drug abuse: No    Economic hx:  Current Work Status: Employed  Type of Physical Activity at Work is sedentary.  # of days missed in the past 30 days due to OA affected knee: none  [place] Status: No  Level of physical activity during last 12 months: Moderate  Highest Level of Education Obtained: Master's Degree    Medical Hx:  Target Knee for study: right   OA Severity according to patient: severe  Year OA was Dx on target knee: 2007  allergies and past medical history  Active Ambulatory Problems     Diagnosis Date Noted    Sleep apnea 12/14/2022    LOLLY on CPAP 12/14/2022    HLD (hyperlipidemia)     Primary hypertension     Diabetes mellitus     Morbid obesity 09/15/2023    Post-traumatic osteoarthritis of right knee 10/24/2024    Chronic pain of right knee 10/24/2024    Pyogenic arthritis of right knee joint 10/24/2024     Resolved Ambulatory Problems     Diagnosis Date Noted    No Resolved Ambulatory Problems     Past Medical History:   Diagnosis Date    Eczema        Surgical Hx:  Past Surgical History:   Procedure Laterality Date    FEMUR FRACTURE SURGERY (several) Right 2000    Knee scope  Right  2005         OA Treatment Hx:  Did pt receive steroid injections (intra-articular) for knee OA within last 12 months?  No  Did the patient receive viscosupplementation within last 12 months? No  Is there a hx of cryoanalgesia or radio-frequency ablation tx for the affected knee within last 12 months? No  Herbal therapy or supplements within last 12  months for OA knee pain? Yes  Medical marijuana use within last 12 months for OA pain? No  Hx of acupuncture within last 12 months for OA pain? Yes (dry needling)  Knee brace use within last 12 months for OA pain? No  Physical therapy within last 12 months for OA knee pain? Yes  Did the patient receive any treatments (other than the above) for Knee OA within the last 12 months? Yes ([place] ELECTRIC STIMULATION THERAPY)    Prior and Current OA Medications/ Any Analgesic Medications used in the last three months prior to enrollment: (Per study sponsor list)  Patient states the following medications were taken:    Tylenol for OA knee pain  Aspirin for OA knee pain  Advil for OA knee pain  Celebrex for shoulder pain

## 2024-10-24 NOTE — PROGRESS NOTES
Protocol: Innovations in Genicular Outcomes Registry (Rosa)  Protocol#:Rosa  IRB# 2021.156  Version Date: 10-Smith-2023  PI: Lino Aldana MD  Patient Initials: AYESHA  (Study ID# [id number])       Treatment Visit     Research staff approached patient in private clinic room prior to OA injection. Patient awake, alert. Mood and affect appropriate to situation. Patient stated willingness to continue participation in above study. Patient states pain for target knee is 3 on pain scale. Dr. Aldana entered the room to administer Toradol to patient's target knee (RIGHT). Patient tolerated procedure well with no immediate complications. No analgesic medication prescribed after the treatment.     Research staff discussed the daily questionnaires that start tomorrow. Patient stated understanding and said he will complete the questionnaires as soon as the e-mail reminder is received. Patient reports post procedure pain of target knee as a 2 on pain scale. Patient instructed to call Dr. Aldana or research team with any questions or concerns.

## 2024-10-24 NOTE — PROCEDURES
Large Joint Aspiration/Injection: R knee    Date/Time: 10/24/2024 11:15 AM    Performed by: Lino Aldana MD  Authorized by: Lino Aldana MD    Consent Done?:  Yes (Verbal)  Indications:  Arthritis  Site marked: the procedure site was marked    Timeout: prior to procedure the correct patient, procedure, and site was verified    Prep: patient was prepped and draped in usual sterile fashion      Local anesthesia used?: Yes    Local anesthetic:  Topical anesthetic    Details:  Needle Size:  22 G  Ultrasonic Guidance for needle placement?: No    Approach:  Anterolateral  Location:  Knee  Site:  R knee  Medications:  30 mg ketorolac 30 mg/mL (1 mL); 5 mL BUPivacaine 0.5 % (5 mg/mL); 4 mL LIDOcaine HCL 10 mg/ml (1%) 10 mg/mL (1 %)  Patient tolerance:  Patient tolerated the procedure well with no immediate complications

## 2024-10-24 NOTE — PROGRESS NOTES
Protocol: innovations in Genicular Outcomes Registry (Rosa)  Protocol#: Rosa  IRB#: 2021.156  Version Date: 10-Smith-2023  PI: Lino Aldana MD  Patient Initials: [email or web address]     Informed Consent Process Note:     Research coordinator met with patient, in private clinic room, to discuss above mentioned protocol. Patient is alert and oriented x 3. Mood and affect appropriate to the situation. Patient states that he is not participating in any other research studies. Patient states understanding about the diagnosis of osteoarthritis of the knee and of the procedure to being done on that knee.  Purpose of study reviewed with the patient.  Patient states understanding of this information.   Length of study and number of participants reviewed with patient; patient states understanding of the length of the study.   Study procedure discussed in detail with patient. Patient states understanding of this information.  Potential benefits of study along with costs and/or payment reimbursement per insurance discussed with patient; Patient states understanding that insurance will cover cost of all standard of care medications and procedures. Patient aware of $20 payment for qualifying visits with completed questionnaires.  Alternative treatment methods discussed with patient; Patient states understanding of this information.   Study related questions/compensation for injury, and whom to contact regarding rights as a research subject all reviewed with patient; Patient verbalizes understanding of this information.   Voluntary participation and withdrawal from research stressed to patient; patient states understanding that he may withdraw consent at any time without compromise to care.   Confidentiality and HIPAA discussed with patient. Patient verbalizes understanding of this information.      Patient states his willingness to participate in study; [employer] eligibility confirmed and [employer] link send to patient's e-mail address. Patient  instructed to read informed consent in its entirety, initial at the bottom of each page, then sign the last page via their smart device or computer and submit. Throughout consenting process, patient given several opportunities to ask questions; all questions addressed and answered to patient's satisfaction.      Patient signed eConsent and research team completed the staff portion. Patient completed all baseline/screening questionnaires prior to any procedures.    Patient given [place]. Patient aware it may take 24-48 hrs for the funds to appear on the card balance.

## 2024-10-24 NOTE — PROGRESS NOTES
Subjective:      Patient ID: Krish Ramos is a 44 y.o. male.    Chief Complaint: Pain of the Right Knee      Patient ID: Krish Ramos is a 44 y.o. male.    Chief Complaint: Pain of the Right Knee      KNEE PAIN: Complains of pain to the rightknee.   PAIN LOCATED: anterior, lateral, and medial  ONSET: 15 years ago.  QUALITY:  Patient states the pain is worsening  HISTORY: Previous knee injury/surgery: Yes  Hx: distal fem fx w osteo as child  ASSOCIATED SYMPTOM AND TRIGGERS:Standing/Weightbearing, walking, trouble w stairs, stiffness, swelling, limping, stiffness w sitting   Has tried and failed cardiovascular activities such as walking, biking and resistance exercises  USES ASSISTIVE DEVICE: none  RELIEVED BY:rest, heat, ice, avoiding painful activities, cortisone injection- 2 weeks relief, medications- tylenol min relief  PATIENT DENIES: bruising, redness, deformity        Social History     Occupational History    Not on file   Tobacco Use    Smoking status: Never    Smokeless tobacco: Never   Substance and Sexual Activity    Alcohol use: Not Currently    Drug use: Never    Sexual activity: Not Currently      Social Drivers of Health     Tobacco Use: Low Risk  (10/24/2024)    Patient History     Smoking Tobacco Use: Never     Smokeless Tobacco Use: Never     Passive Exposure: Not on file   Alcohol Use: Not At Risk (1/24/2024)    AUDIT-C     Frequency of Alcohol Consumption: Never     Average Number of Drinks: Patient does not drink     Frequency of Binge Drinking: Never   Financial Resource Strain: Low Risk  (1/24/2024)    Overall Financial Resource Strain (CARDIA)     Difficulty of Paying Living Expenses: Not hard at all   Food Insecurity: No Food Insecurity (1/24/2024)    Hunger Vital Sign     Worried About Running Out of Food in the Last Year: Never true     Ran Out of Food in the Last Year: Never true   Transportation Needs: No Transportation Needs (1/24/2024)    PRAPARE - Transportation     Lack of  Transportation (Medical): No     Lack of Transportation (Non-Medical): No   Physical Activity: Sufficiently Active (1/24/2024)    Exercise Vital Sign     Days of Exercise per Week: 3 days     Minutes of Exercise per Session: 60 min   Stress: No Stress Concern Present (1/24/2024)    [place] [employer] - Occupational Stress Questionnaire     Feeling of Stress : Not at all   Housing Stability: Low Risk  (1/24/2024)    Housing Stability Vital Sign     Unable to Pay for Housing in the Last Year: No     Number of Places Lived in the Last Year: 1     Unstable Housing in the Last Year: No   Depression: Low Risk  (1/30/2024)    Depression     Last PHQ-4: Flowsheet Data: 0   Utilities: Not on file   Health Literacy: Not on file   Social Isolation: Not on file      Review of Systems   Constitutional: Negative for diaphoresis.   HENT:  Negative for ear discharge, nosebleeds and stridor.    Eyes:  Negative for photophobia.   Cardiovascular:  Negative for syncope.   Respiratory:  Negative for hemoptysis, shortness of breath and wheezing.    Neurological:  Negative for tremors.   Psychiatric/Behavioral: Negative.           Objective:    General    Constitutional: He is oriented to person, place, and time. He appears well-developed.   HENT:   Head: Normocephalic and atraumatic.   Right Ear: External ear normal.   Left Ear: External ear normal.   Eyes: EOM are normal.   Cardiovascular:  Intact distal pulses.            Neurological: He is alert and oriented to person, place, and time.   Psychiatric: He has a normal mood and affect. His behavior is normal. Judgment and thought content normal.     General Musculoskeletal Exam   Gait: antalgic and abnormal       Right Knee Exam     Inspection   Swelling: present  Effusion: present    Tenderness   The patient is tender to palpation of the medial joint line.    Crepitus   The patient has crepitus of the patella.    Range of Motion   Extension:  10   Flexion:  50  abnormal     Tests   Ligament Examination   MCL - Valgus: normal (0 to 2mm)  LCL - Varus: normal    Other   Sensation: normal    Muscle Strength   Right Lower Extremity   Quadriceps:  4/5   Hamstrin/5          Assessment:       1. Primary osteoarthritis of right knee    2. Chronic pain of right knee    3. Morbid obesity    4. Pyogenic arthritis of right knee joint, due to unspecified organism    5. Post-traumatic osteoarthritis of right knee          Plan:   We had a lengthy discussion regarding the natural history of osteoarthritis.   The patient would like to continue nonoperative management, and I think that is Reasonable. The patient has severe bone on bone knee oa. KL score 4  We went ahead and injected the right  with 1 dose of ketorolac, Marcaine, and lidocaine. We reviewed the risks (incl side effects and allergies), benefits, of all treatment options including injections.   We will see the patient back on a p.r.n. basis as their symptoms dictate.  We also did begin discussing total knee arthroplasty. The patient was given educational literature regarding knee OA and total knee arthroplasty. Will try zilretta  Needs bmi <40 for tka

## 2024-10-29 ENCOUNTER — PATIENT MESSAGE (OUTPATIENT)
Dept: ORTHOPEDICS | Facility: CLINIC | Age: 44
End: 2024-10-29
Payer: COMMERCIAL

## 2024-11-16 DIAGNOSIS — L20.89 OTHER ATOPIC DERMATITIS: ICD-10-CM

## 2024-11-16 DIAGNOSIS — Z79.899 ENCOUNTER FOR LONG-TERM (CURRENT) USE OF MEDICATIONS: ICD-10-CM

## 2024-11-18 ENCOUNTER — TELEPHONE (OUTPATIENT)
Dept: DERMATOLOGY | Facility: CLINIC | Age: 44
End: 2024-11-18

## 2024-11-18 ENCOUNTER — LAB VISIT (OUTPATIENT)
Dept: LAB | Facility: HOSPITAL | Age: 44
End: 2024-11-18
Attending: DERMATOLOGY
Payer: COMMERCIAL

## 2024-11-18 ENCOUNTER — PATIENT MESSAGE (OUTPATIENT)
Dept: DERMATOLOGY | Facility: CLINIC | Age: 44
End: 2024-11-18

## 2024-11-18 ENCOUNTER — OFFICE VISIT (OUTPATIENT)
Dept: DERMATOLOGY | Facility: CLINIC | Age: 44
End: 2024-11-18
Payer: COMMERCIAL

## 2024-11-18 DIAGNOSIS — Z79.899 ENCOUNTER FOR LONG-TERM (CURRENT) USE OF MEDICATIONS: ICD-10-CM

## 2024-11-18 DIAGNOSIS — L20.89 OTHER ATOPIC DERMATITIS: ICD-10-CM

## 2024-11-18 DIAGNOSIS — L20.89 OTHER ATOPIC DERMATITIS: Primary | ICD-10-CM

## 2024-11-18 LAB
ALBUMIN SERPL BCP-MCNC: 3.8 G/DL (ref 3.5–5.2)
ALP SERPL-CCNC: 60 U/L (ref 40–150)
ALT SERPL W/O P-5'-P-CCNC: 18 U/L (ref 10–44)
ANION GAP SERPL CALC-SCNC: 12 MMOL/L (ref 8–16)
AST SERPL-CCNC: 21 U/L (ref 10–40)
BASOPHILS # BLD AUTO: 0.1 K/UL (ref 0–0.2)
BASOPHILS NFR BLD: 1.5 % (ref 0–1.9)
BILIRUB SERPL-MCNC: 0.7 MG/DL (ref 0.1–1)
BUN SERPL-MCNC: 14 MG/DL (ref 6–20)
CALCIUM SERPL-MCNC: 10.5 MG/DL (ref 8.7–10.5)
CHLORIDE SERPL-SCNC: 104 MMOL/L (ref 95–110)
CO2 SERPL-SCNC: 24 MMOL/L (ref 23–29)
CREAT SERPL-MCNC: 0.9 MG/DL (ref 0.5–1.4)
DIFFERENTIAL METHOD BLD: ABNORMAL
EOSINOPHIL # BLD AUTO: 0.7 K/UL (ref 0–0.5)
EOSINOPHIL NFR BLD: 11.1 % (ref 0–8)
ERYTHROCYTE [DISTWIDTH] IN BLOOD BY AUTOMATED COUNT: 13.8 % (ref 11.5–14.5)
EST. GFR  (NO RACE VARIABLE): >60 ML/MIN/1.73 M^2
GLUCOSE SERPL-MCNC: 83 MG/DL (ref 70–110)
HCT VFR BLD AUTO: 39.8 % (ref 40–54)
HGB BLD-MCNC: 13.4 G/DL (ref 14–18)
IMM GRANULOCYTES # BLD AUTO: 0.01 K/UL (ref 0–0.04)
IMM GRANULOCYTES NFR BLD AUTO: 0.2 % (ref 0–0.5)
LYMPHOCYTES # BLD AUTO: 2.3 K/UL (ref 1–4.8)
LYMPHOCYTES NFR BLD: 34.6 % (ref 18–48)
MCH RBC QN AUTO: 30.4 PG (ref 27–31)
MCHC RBC AUTO-ENTMCNC: 33.7 G/DL (ref 32–36)
MCV RBC AUTO: 90 FL (ref 82–98)
MONOCYTES # BLD AUTO: 0.6 K/UL (ref 0.3–1)
MONOCYTES NFR BLD: 8.8 % (ref 4–15)
NEUTROPHILS # BLD AUTO: 2.9 K/UL (ref 1.8–7.7)
NEUTROPHILS NFR BLD: 43.8 % (ref 38–73)
NRBC BLD-RTO: 0 /100 WBC
PLATELET # BLD AUTO: 328 K/UL (ref 150–450)
PMV BLD AUTO: 9.6 FL (ref 9.2–12.9)
POTASSIUM SERPL-SCNC: 3.9 MMOL/L (ref 3.5–5.1)
PROT SERPL-MCNC: 8.4 G/DL (ref 6–8.4)
RBC # BLD AUTO: 4.41 M/UL (ref 4.6–6.2)
SODIUM SERPL-SCNC: 140 MMOL/L (ref 136–145)
WBC # BLD AUTO: 6.57 K/UL (ref 3.9–12.7)

## 2024-11-18 PROCEDURE — 80053 COMPREHEN METABOLIC PANEL: CPT | Performed by: DERMATOLOGY

## 2024-11-18 PROCEDURE — 36415 COLL VENOUS BLD VENIPUNCTURE: CPT | Performed by: DERMATOLOGY

## 2024-11-18 PROCEDURE — 1159F MED LIST DOCD IN RCRD: CPT | Mod: CPTII,95,, | Performed by: DERMATOLOGY

## 2024-11-18 PROCEDURE — 3044F HG A1C LEVEL LT 7.0%: CPT | Mod: CPTII,95,, | Performed by: DERMATOLOGY

## 2024-11-18 PROCEDURE — G2211 COMPLEX E/M VISIT ADD ON: HCPCS | Mod: 95,,, | Performed by: DERMATOLOGY

## 2024-11-18 PROCEDURE — 85025 COMPLETE CBC W/AUTO DIFF WBC: CPT | Performed by: DERMATOLOGY

## 2024-11-18 PROCEDURE — 1160F RVW MEDS BY RX/DR IN RCRD: CPT | Mod: CPTII,95,, | Performed by: DERMATOLOGY

## 2024-11-18 PROCEDURE — 99214 OFFICE O/P EST MOD 30 MIN: CPT | Mod: 95,,, | Performed by: DERMATOLOGY

## 2024-11-18 RX ORDER — TRIAMCINOLONE ACETONIDE 1 MG/G
OINTMENT TOPICAL
Qty: 80 G | Refills: 3 | Status: SHIPPED | OUTPATIENT
Start: 2024-11-18

## 2024-11-18 RX ORDER — DUPILUMAB 300 MG/2ML
INJECTION, SOLUTION SUBCUTANEOUS
Qty: 4 ML | Refills: 11 | Status: ACTIVE | OUTPATIENT
Start: 2024-11-18

## 2024-11-18 RX ORDER — TRIAMCINOLONE ACETONIDE 1 MG/G
OINTMENT TOPICAL
Qty: 80 G | Refills: 3 | Status: SHIPPED | OUTPATIENT
Start: 2024-11-18 | End: 2024-11-18

## 2024-11-18 RX ORDER — TACROLIMUS 1 MG/G
OINTMENT TOPICAL
Qty: 60 G | Refills: 5 | Status: SHIPPED | OUTPATIENT
Start: 2024-11-18

## 2024-11-18 NOTE — PROGRESS NOTES
Subjective:       Patient ID:  Krish Ramos is a 44 y.o. male who presents for No chief complaint on file.    The patient location is: home  The chief complaint leading to consultation is: dupixent refill    Visit type: audiovisual    Face to Face time with patient: 15 min  20 minutes of total time spent on the encounter, which includes face to face time and non-face to face time preparing to see the patient (eg, review of tests), Obtaining and/or reviewing separately obtained history, Documenting clinical information in the electronic or other health record, Independently interpreting results (not separately reported) and communicating results to the patient/family/caregiver, or Care coordination (not separately reported).         Each patient to whom he or she provides medical services by telemedicine is:  (1) informed of the relationship between the physician and patient and the respective role of any other health care provider with respect to management of the patient; and (2) notified that he or she may decline to receive medical services by telemedicine and may withdraw from such care at any time.    Notes:     Hx of severe atopic dermatitis and allergies to fish, walnuts and stone fruit, last seen on 10/9/23.  On dupixent intermittent since 2019.  + improvement in skin.  Has few residual areas on leg. He states pruritus and discoloration has improved with triamcinolone ointment, needs refill.    Prior treatments: dupixent since 2019, betamethasone, NBUVB, triamcinonlone, epiceram, tacrolimus    Denies hx of cold sores, conjunctivitis, painful vision or sores in or near the eye.           Review of Systems   Constitutional:  Negative for fever and chills.   HENT:  Negative for mouth sores.    Eyes:  Negative for itching, eye watering, eye irritation and eyelid inflammation.   Respiratory:  Negative for shortness of breath.    Gastrointestinal:  Negative for nausea, vomiting and diarrhea.   Genitourinary:   Negative for genital sores.   Skin:  Positive for itching, rash and activity-related sunscreen use. Negative for daily sunscreen use and recent sunburn.   Hematologic/Lymphatic: Does not bruise/bleed easily.   Allergic/Immunologic: Positive for environmental allergies.        Objective:    Physical Exam   Constitutional: He appears well-developed and well-nourished. No distress.   Neurological: He is alert and oriented to person, place, and time. He is not disoriented.   Psychiatric: He has a normal mood and affect.   Skin:   Areas Examined (abnormalities noted in diagram):   Head / Face Inspection Performed  Neck Inspection Performed  RUE Inspected  LUE Inspection Performed  Nails and Digits Inspection Performed                 Assessment / Plan:        Other atopic dermatitis  Encounter for long-term (current) use of medications  -     CBC Auto Differential; Future; Expected date: 11/18/2024  -     Comprehensive Metabolic Panel; Future; Expected date: 11/18/2024  -     DUPIXENT SYRINGE 300 mg/2 mL Syrg; INJECT 1 SYRINGE UNDER THE SKIN EVERY 14 DAYS  Dispense: 2 each; Refill: 11  -     triamcinolone acetonide 0.1% (KENALOG) 0.1 % ointment; AAA bid prn. Do not use on face, underarms or groin.  Dispense: 80 g; Refill: 3  -     will check labs.  EASI score = 1 (from baseline of 25).  Marked improvement from prior.  We will plan to continue with Dupixent.  We will refill triamcinolone ointment.      Reviewed side effects of immunosuppression, conjunctivitis/keratitis and reactivation of the herpes virus.  Discussed that if patient develops sores, vesicles, or ulcerations near the eye, then he should present to the emergency room for evaluation and ophthalmology consultation.  The patient acknowledged understanding and wishes to proceed with Dupixent therapy.            Follow up in about 1 year (around 11/18/2025).

## 2024-11-18 NOTE — TELEPHONE ENCOUNTER
Recall placed. Pt scheduled for labs today at the [facility]. Pt verbalized understanding   ----- Message from Sonja Rapp MD sent at 11/18/2024  9:44 AM CST -----  Please send 1 year recall and send link [facility] or call to schedule appt

## 2024-11-19 PROBLEM — L20.89 OTHER ATOPIC DERMATITIS: Status: ACTIVE | Noted: 2024-11-19

## 2024-11-20 ENCOUNTER — TELEPHONE (OUTPATIENT)
Dept: CARDIOTHORACIC SURGERY | Facility: CLINIC | Age: 44
End: 2024-11-20
Payer: COMMERCIAL

## 2024-12-04 ENCOUNTER — OFFICE VISIT (OUTPATIENT)
Dept: CARDIOLOGY | Facility: CLINIC | Age: 44
End: 2024-12-04
Payer: COMMERCIAL

## 2024-12-04 VITALS
HEART RATE: 87 BPM | DIASTOLIC BLOOD PRESSURE: 84 MMHG | HEIGHT: 72 IN | SYSTOLIC BLOOD PRESSURE: 118 MMHG | OXYGEN SATURATION: 98 % | WEIGHT: 315 LBS | BODY MASS INDEX: 42.66 KG/M2

## 2024-12-04 DIAGNOSIS — G47.33 OSA (OBSTRUCTIVE SLEEP APNEA): ICD-10-CM

## 2024-12-04 DIAGNOSIS — E66.01 SEVERE OBESITY (BMI >= 40): ICD-10-CM

## 2024-12-04 DIAGNOSIS — I10 PRIMARY HYPERTENSION: ICD-10-CM

## 2024-12-04 DIAGNOSIS — E11.69 TYPE 2 DIABETES MELLITUS WITH OTHER SPECIFIED COMPLICATION, UNSPECIFIED WHETHER LONG TERM INSULIN USE: ICD-10-CM

## 2024-12-04 DIAGNOSIS — R94.31 NONSPECIFIC ABNORMAL ELECTROCARDIOGRAM (ECG) (EKG): ICD-10-CM

## 2024-12-04 DIAGNOSIS — R07.9 CHEST PAIN, UNSPECIFIED TYPE: Primary | ICD-10-CM

## 2024-12-04 PROCEDURE — 99999 PR PBB SHADOW E&M-EST. PATIENT-LVL III: CPT | Mod: PBBFAC,,, | Performed by: INTERNAL MEDICINE

## 2024-12-04 PROCEDURE — 3079F DIAST BP 80-89 MM HG: CPT | Mod: CPTII,S$GLB,, | Performed by: INTERNAL MEDICINE

## 2024-12-04 PROCEDURE — G2211 COMPLEX E/M VISIT ADD ON: HCPCS | Mod: S$GLB,,, | Performed by: INTERNAL MEDICINE

## 2024-12-04 PROCEDURE — 1160F RVW MEDS BY RX/DR IN RCRD: CPT | Mod: CPTII,S$GLB,, | Performed by: INTERNAL MEDICINE

## 2024-12-04 PROCEDURE — 3044F HG A1C LEVEL LT 7.0%: CPT | Mod: CPTII,S$GLB,, | Performed by: INTERNAL MEDICINE

## 2024-12-04 PROCEDURE — 99214 OFFICE O/P EST MOD 30 MIN: CPT | Mod: S$GLB,,, | Performed by: INTERNAL MEDICINE

## 2024-12-04 PROCEDURE — 1159F MED LIST DOCD IN RCRD: CPT | Mod: CPTII,S$GLB,, | Performed by: INTERNAL MEDICINE

## 2024-12-04 PROCEDURE — 3074F SYST BP LT 130 MM HG: CPT | Mod: CPTII,S$GLB,, | Performed by: INTERNAL MEDICINE

## 2024-12-04 PROCEDURE — 3008F BODY MASS INDEX DOCD: CPT | Mod: CPTII,S$GLB,, | Performed by: INTERNAL MEDICINE

## 2024-12-04 RX ORDER — TIRZEPATIDE 5 MG/.5ML
5 INJECTION, SOLUTION SUBCUTANEOUS
Qty: 4 ML | Refills: 1 | Status: SHIPPED | OUTPATIENT
Start: 2024-12-04

## 2024-12-04 NOTE — PROGRESS NOTES
Subjective:   Patient ID:  Krish Ramos is a 44 y.o. male who presents for cardiac consult of No chief complaint on file.    Referring physician: Rohit Gomez MD   Reason for consult: abnormal ECG    HPI  The patient came in today for cardiac consult of No chief complaint on file.      Krish Ramos is a 44 y.o. male pt with HTN, HLD, DM2, obesity BMI > 40, LOLLY presents for follow up CV eval.         3/8/24  BP elevated 140s/90s. . BMI 45 - 314 lbs.   BP improved in office.     8/23/24  Pt has been having chest pain only at night, no nausea. No SOB.   Uses CPAP at night.     12/2/24  BP and HR stable. BMI 42 - 315 lbs   ECHO and Nuc stress neg 9/2024.   He has not been losing much weight wants to try Mounjaro.     Patient has fairly good exercise tolerance. 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    FH - father - HTN, DM - 75    Results for orders placed during the hospital encounter of 09/17/24    Echo    Interpretation Summary    Left Ventricle: The left ventricle is normal in size. Normal wall thickness. There is normal systolic function with a visually estimated ejection fraction of 55 - 60%. There is normal diastolic function.    Right Ventricle: Normal right ventricular cavity size. Systolic function is normal.    Left Atrium: Left atrium is mildly dilated.    IVC/SVC: Normal venous pressure at 3 mmHg.    Results for orders placed during the hospital encounter of 09/17/24    Nuclear Stress - Cardiology Interpreted    Interpretation Summary    Normal myocardial perfusion scan. There is no evidence of myocardial ischemia or infarction.    The gated perfusion images showed an ejection fraction of 57% at rest. The gated perfusion images showed an ejection fraction of 61% post stress.    The ECG portion of the study is negative for ischemia.    The patient reported no chest pain during the stress test.    There were no arrhythmias during  stress.        Results for orders placed during the hospital encounter of 04/21/23    Echo    Interpretation Summary  · The left ventricle is normal in size with mild concentric hypertrophy and normal systolic function.  · The estimated ejection fraction is 65%.  · Normal left ventricular diastolic function.  · Normal right ventricular size with normal right ventricular systolic function.  · Mild tricuspid regurgitation.  · Intermediate central venous pressure (8 mmHg).  · The estimated PA systolic pressure is 25 mmHg.      Results for orders placed during the hospital encounter of 04/21/23    Exercise Stress - EKG    Interpretation Summary    The ECG portion of the study is negative for ischemia.    The patient reported no chest pain during the stress test.    The blood pressure response to stress was normal.    The exercise capacity was above average.    The patient exercised for 6 minutes 0 seconds on a Felipe protocol, corresponding to a functional capacity of 7 METS, achieving a peak heart rate of 171 bpm, which is 96 % of the age predicted maximum heart rate. Their exercise capacity was above average.      ECG  Normal sinus rhythm   Inferior infarct ,age undetermined   Abnormal ECG   No previous ECGs available   Confirmed by MANJINDER ALANIZ MD ([id number]) on 3/10/2023 11:45:51 AM     Referred By: MONTEZ DEL RIO           Confirmed By:MANJINDER ALANIZ MD    Past Medical History:   Diagnosis Date    Diabetes mellitus     Eczema     HLD (hyperlipidemia)     Primary hypertension        Past Surgical History:   Procedure Laterality Date    FEMUR FRACTURE SURGERY Right        Social History     Tobacco Use    Smoking status: Never    Smokeless tobacco: Never   Substance Use Topics    Alcohol use: Not Currently    Drug use: Never       No family history on file.    Patient's Medications   New Prescriptions    TIRZEPATIDE (MOUNJARO) 5 MG/0.5 ML PNIJ    Inject 5 mg into the skin every 7 days. Stop Ozempic 1 week prior to this, call  office in 6 weeks to increase dose   Previous Medications    BLOOD SUGAR DIAGNOSTIC STRP    Use with glucometer daily as needed. Please send accu-chek dawit strips.    CELECOXIB (CELEBREX) 200 MG CAPSULE    Take 200-400 mg by mouth daily as needed.    DUPIXENT SYRINGE 300 MG/2 ML SYRG    INJECT 1 SYRINGE UNDER THE SKIN EVERY 14 DAYS    EPINEPHRINE (EPIPEN) 0.3 MG/0.3 ML ATIN    Inject 0.3 mLs (0.3 mg total) into the muscle once. for 1 dose    LANCETS (ACCU-CHEK SOFTCLIX LANCETS) MISC    1 each by Misc.(Non-Drug; Combo Route) route 2 (two) times a day.    OLMESARTAN-AMLODIPIN-HCTHIAZID 40-10-25 MG TAB    Take 1 tablet by mouth once daily.    SEMAGLUTIDE (OZEMPIC) 2 MG/DOSE (8 MG/3 ML) PNIJ    Inject 2 mg into the skin every 7 days.    TACROLIMUS (PROTOPIC) 0.1 % OINTMENT    APPLY TO AFFECTED AREAS TWICE DAILY. NOT A STEROID AND IS SAFE TO USE LONG TERM    TRIAMCINOLONE ACETONIDE 0.1% (KENALOG) 0.1 % OINTMENT    AAA bid prn. Do not use on face, underarms or groin.   Modified Medications    No medications on file   Discontinued Medications    No medications on file       Review of Systems   Constitutional: Negative.    HENT: Negative.     Eyes: Negative.    Respiratory: Negative.     Cardiovascular: Negative.    Gastrointestinal: Negative.    Genitourinary: Negative.    Musculoskeletal: Negative.    Skin: Negative.    Neurological: Negative.    Endo/Heme/Allergies: Negative.    Psychiatric/Behavioral: Negative.     All 12 systems otherwise negative.      Wt Readings from Last 3 Encounters:   12/04/24 (!) 142.9 kg (315 lb 0.6 oz)   10/24/24 (!) 141.5 kg (311 lb 15.2 oz)   09/17/24 (!) 141.5 kg (312 lb)     Temp Readings from Last 3 Encounters:   01/30/24 99.2 °F (37.3 °C) (Tympanic)   09/20/21 98.1 °F (36.7 °C) (Temporal)     BP Readings from Last 3 Encounters:   12/04/24 118/84   09/17/24 121/80   09/13/24 121/80     Pulse Readings from Last 3 Encounters:   12/04/24 87   09/13/24 86   08/23/24 100       /84 (BP  Location: Left arm, Patient Position: Sitting)   Pulse 87   Ht 6' (1.829 m)   Wt (!) 142.9 kg (315 lb 0.6 oz)   SpO2 98%   BMI 42.73 kg/m²     Objective:   Physical Exam  Vitals and nursing note reviewed.   Constitutional:       General: He is not in acute distress.     Appearance: He is well-developed. He is obese. He is not diaphoretic.   HENT:      Head: Normocephalic and atraumatic.      Nose: Nose normal.   Eyes:      General: No scleral icterus.     Conjunctiva/sclera: Conjunctivae normal.   Neck:      Thyroid: No thyromegaly.      Vascular: No JVD.   Cardiovascular:      Rate and Rhythm: Normal rate and regular rhythm.      Heart sounds: S1 normal and S2 normal. No murmur heard.     No friction rub. No gallop. No S3 or S4 sounds.   Pulmonary:      Effort: Pulmonary effort is normal. No respiratory distress.      Breath sounds: Normal breath sounds. No stridor. No wheezing or rales.   Chest:      Chest wall: No tenderness.   Abdominal:      General: Bowel sounds are normal. There is no distension.      Palpations: Abdomen is soft. There is no mass.      Tenderness: There is no abdominal tenderness. There is no rebound.   Genitourinary:     Comments: Deferred  Musculoskeletal:         General: No tenderness or deformity. Normal range of motion.      Cervical back: Normal range of motion and neck supple.   Lymphadenopathy:      Cervical: No cervical adenopathy.   Skin:     General: Skin is warm and dry.      Coloration: Skin is not pale.      Findings: No erythema or rash.   Neurological:      Mental Status: He is alert and oriented to person, place, and time.      Motor: No abnormal muscle tone.      Coordination: Coordination normal.   Psychiatric:         Behavior: Behavior normal.         Thought Content: Thought content normal.         Judgment: Judgment normal.         Lab Results   Component Value Date     11/18/2024    K 3.9 11/18/2024     11/18/2024    CO2 24 11/18/2024    BUN 14  11/18/2024    CREATININE 0.9 11/18/2024    GLU 83 11/18/2024    HGBA1C 5.7 (H) 02/19/2024    AST 21 11/18/2024    ALT 18 11/18/2024    ALBUMIN 3.8 11/18/2024    PROT 8.4 11/18/2024    BILITOT 0.7 11/18/2024    WBC 6.57 11/18/2024    HGB 13.4 (L) 11/18/2024    HCT 39.8 (L) 11/18/2024    MCV 90 11/18/2024     11/18/2024    TSH 0.717 02/19/2024    CHOL 192 02/19/2024    HDL 44 02/19/2024    LDLCALC 132.6 02/19/2024    TRIG 77 02/19/2024     Assessment:      1. Type 2 diabetes mellitus with other specified complication, unspecified whether long term insulin use    2. LOLLY (obstructive sleep apnea)    3. Severe obesity (BMI >= 40)    4. Primary hypertension    5. Nonspecific abnormal electrocardiogram (ECG) (EKG)    6. Chest pain, unspecified type              Plan:     Abnormal ECG - inferior Q waves with CP more at night  - ECHO 4/2023 with normal bi V function, mild TR, PASP 25 mmHg. ECG stress test 4/2023 neg for ischemia.   - ECHO and Nuc stress neg 9/2024.   - r/o non cardiac etiologies as well;    2. HTN  - titrate meds    3. HLD  - needs improvement   - cont low miri diet     4. LOLLY, moderate  - cont CPAP    5. DM2 A1c 7.2 --> 5.7  - cont tx, needs improvement   - diet controlled   -cont Ozempic 2mg change to Mounjaro     6. Obesity, BMI 45 -->  BMI 44 - 327 lbs. --> 314 lbs --> . BMI 42 - 315 lbs   - cont weight loss     Visit today included increased complexity associated with the care of the episodic problem chest pain addressed and managing the longitudinal care of the patient due to the serious and/or complex managed problem(s) .      Thank you for allowing me to participate in this patient's care. Please do not hesitate to contact me with any questions or concerns. Consult note has been forwarded to the referral physician.

## 2025-01-09 ENCOUNTER — LAB VISIT (OUTPATIENT)
Dept: LAB | Facility: HOSPITAL | Age: 45
End: 2025-01-09
Attending: FAMILY MEDICINE
Payer: COMMERCIAL

## 2025-01-09 ENCOUNTER — OFFICE VISIT (OUTPATIENT)
Dept: FAMILY MEDICINE | Facility: CLINIC | Age: 45
End: 2025-01-09
Attending: FAMILY MEDICINE
Payer: COMMERCIAL

## 2025-01-09 VITALS
HEIGHT: 72 IN | WEIGHT: 309.94 LBS | DIASTOLIC BLOOD PRESSURE: 80 MMHG | OXYGEN SATURATION: 99 % | TEMPERATURE: 97 F | SYSTOLIC BLOOD PRESSURE: 124 MMHG | BODY MASS INDEX: 41.98 KG/M2 | HEART RATE: 97 BPM

## 2025-01-09 DIAGNOSIS — E66.01 MORBID OBESITY: ICD-10-CM

## 2025-01-09 DIAGNOSIS — I10 HYPERTENSION, UNSPECIFIED TYPE: ICD-10-CM

## 2025-01-09 DIAGNOSIS — G47.33 OSA ON CPAP: ICD-10-CM

## 2025-01-09 DIAGNOSIS — E78.49 OTHER HYPERLIPIDEMIA: ICD-10-CM

## 2025-01-09 DIAGNOSIS — E11.69 TYPE 2 DIABETES MELLITUS WITH OTHER SPECIFIED COMPLICATION, UNSPECIFIED WHETHER LONG TERM INSULIN USE: Primary | ICD-10-CM

## 2025-01-09 DIAGNOSIS — E11.69 TYPE 2 DIABETES MELLITUS WITH OTHER SPECIFIED COMPLICATION, UNSPECIFIED WHETHER LONG TERM INSULIN USE: ICD-10-CM

## 2025-01-09 DIAGNOSIS — L20.89 OTHER ATOPIC DERMATITIS: ICD-10-CM

## 2025-01-09 LAB
ALBUMIN/CREAT UR: 5.9 UG/MG (ref 0–30)
CREAT UR-MCNC: 441 MG/DL (ref 23–375)
ESTIMATED AVG GLUCOSE: 111 MG/DL (ref 68–131)
HBA1C MFR BLD: 5.5 % (ref 4–5.6)
MICROALBUMIN UR DL<=1MG/L-MCNC: 26 UG/ML

## 2025-01-09 PROCEDURE — 83036 HEMOGLOBIN GLYCOSYLATED A1C: CPT

## 2025-01-09 PROCEDURE — 36415 COLL VENOUS BLD VENIPUNCTURE: CPT | Mod: PO

## 2025-01-09 PROCEDURE — 82043 UR ALBUMIN QUANTITATIVE: CPT

## 2025-01-09 PROCEDURE — 99999 PR PBB SHADOW E&M-EST. PATIENT-LVL IV: CPT | Mod: PBBFAC,,,

## 2025-01-09 RX ORDER — OLMESARTAN MEDOXOMIL, AMLODIPINE AND HYDROCHLOROTHIAZIDE TABLET 40/10/25 MG 40; 10; 25 MG/1; MG/1; MG/1
1 TABLET ORAL DAILY
Qty: 90 TABLET | Refills: 3 | Status: SHIPPED | OUTPATIENT
Start: 2025-01-09

## 2025-01-09 NOTE — PROGRESS NOTES
Subjective     Patient ID: Krish Ramos is a 44 y.o. male.    Chief Complaint: Follow-up    Mr. Ramos comes in to office today for diabetic follow up. States that he noticed on his my chart that he is due for Urine Microalbumin/Cr and A1C. Only complaint today is refill. States that he needs refill on his blood pressure medication.       Review of Systems   Constitutional:  Negative for fever.   HENT: Negative.     Eyes: Negative.    Respiratory:  Negative for shortness of breath and wheezing.    Cardiovascular:  Negative for leg swelling.   Gastrointestinal:  Negative for constipation and diarrhea.   Genitourinary: Negative.    Musculoskeletal: Negative.    Neurological:  Negative for dizziness.   Psychiatric/Behavioral:  Negative for suicidal ideas.           Objective     Physical Exam  Constitutional:       Appearance: Normal appearance.   HENT:      Head: Normocephalic and atraumatic.   Eyes:      Extraocular Movements: Extraocular movements intact.      Conjunctiva/sclera: Conjunctivae normal.      Pupils: Pupils are equal, round, and reactive to light.   Cardiovascular:      Rate and Rhythm: Normal rate and regular rhythm.      Pulses: Normal pulses.           Dorsalis pedis pulses are 2+ on the right side and 2+ on the left side.      Heart sounds: Normal heart sounds.   Pulmonary:      Effort: Pulmonary effort is normal.      Breath sounds: Normal breath sounds.   Abdominal:      General: Bowel sounds are normal.      Palpations: Abdomen is soft.   Musculoskeletal:      Cervical back: Normal range of motion.      Left foot: Bunion present.   Feet:      Right foot:      Protective Sensation: 6 sites tested.  6 sites sensed.      Skin integrity: Callus (to bottom of foot) and dry skin present.      Toenail Condition: Right toenails are normal.      Left foot:      Protective Sensation: 6 sites tested.  6 sites sensed.      Skin integrity: Dry skin present.      Toenail Condition: Left toenails are normal.    Skin:     General: Skin is warm and dry.   Neurological:      General: No focal deficit present.      Mental Status: He is alert and oriented to person, place, and time.   Psychiatric:         Mood and Affect: Mood normal.         Behavior: Behavior normal.         Thought Content: Thought content normal.         Judgment: Judgment normal.            Assessment and Plan     1. Type 2 diabetes mellitus with other specified complication, unspecified whether long term insulin use   -A1c and microalbumin/cr  urine today   -continue present management   -diet and lifestyle changes encouraged    2. Other hyperlipidemia   -continue present management   -low fat diet advised    3. LOLLY on CPAP   -continue present management    4. Morbid obesity   -diet and lifestyle changes encouraged    5. Primary hypertension   -amlodipine/olmesartan/HCTZ renewed          Plan:  1) labs  2) meds renewed  3) healthy lifestyle and diet changes encouraged       Follow up in about 6 months (around 7/9/2025) for with Dr. Turcios.

## 2025-02-03 ENCOUNTER — TELEPHONE (OUTPATIENT)
Dept: RESEARCH | Facility: HOSPITAL | Age: 45
End: 2025-02-03
Payer: COMMERCIAL

## 2025-02-03 NOTE — TELEPHONE ENCOUNTER
Rosa study   Study ID#[id number]    Called to remind patient to complete Month 3 questionnaires which are due this week. Also sent email reminder to patient.

## 2025-02-05 ENCOUNTER — OFFICE VISIT (OUTPATIENT)
Dept: DERMATOLOGY | Facility: CLINIC | Age: 45
End: 2025-02-05
Payer: COMMERCIAL

## 2025-02-05 DIAGNOSIS — Z79.899 ENCOUNTER FOR LONG-TERM (CURRENT) USE OF MEDICATIONS: ICD-10-CM

## 2025-02-05 DIAGNOSIS — L20.89 OTHER ATOPIC DERMATITIS: Primary | ICD-10-CM

## 2025-02-05 PROCEDURE — 3066F NEPHROPATHY DOC TX: CPT | Mod: CPTII,S$GLB,, | Performed by: DERMATOLOGY

## 2025-02-05 PROCEDURE — 1160F RVW MEDS BY RX/DR IN RCRD: CPT | Mod: CPTII,S$GLB,, | Performed by: DERMATOLOGY

## 2025-02-05 PROCEDURE — 3061F NEG MICROALBUMINURIA REV: CPT | Mod: CPTII,S$GLB,, | Performed by: DERMATOLOGY

## 2025-02-05 PROCEDURE — G2211 COMPLEX E/M VISIT ADD ON: HCPCS | Mod: S$GLB,,, | Performed by: DERMATOLOGY

## 2025-02-05 PROCEDURE — 99214 OFFICE O/P EST MOD 30 MIN: CPT | Mod: S$GLB,,, | Performed by: DERMATOLOGY

## 2025-02-05 PROCEDURE — 3072F LOW RISK FOR RETINOPATHY: CPT | Mod: CPTII,S$GLB,, | Performed by: DERMATOLOGY

## 2025-02-05 PROCEDURE — 99999 PR PBB SHADOW E&M-EST. PATIENT-LVL III: CPT | Mod: PBBFAC,,, | Performed by: DERMATOLOGY

## 2025-02-05 PROCEDURE — 1159F MED LIST DOCD IN RCRD: CPT | Mod: CPTII,S$GLB,, | Performed by: DERMATOLOGY

## 2025-02-05 PROCEDURE — 3044F HG A1C LEVEL LT 7.0%: CPT | Mod: CPTII,S$GLB,, | Performed by: DERMATOLOGY

## 2025-02-05 RX ORDER — TACROLIMUS 1 MG/G
OINTMENT TOPICAL
Qty: 60 G | Refills: 11 | Status: SHIPPED | OUTPATIENT
Start: 2025-02-05

## 2025-02-05 RX ORDER — TRIAMCINOLONE ACETONIDE 1 MG/G
OINTMENT TOPICAL
Qty: 80 G | Refills: 5 | Status: SHIPPED | OUTPATIENT
Start: 2025-02-05

## 2025-02-05 NOTE — PROGRESS NOTES
Subjective:       Patient ID:  Krish Ramos is a 44 y.o. male who presents for   Chief Complaint   Patient presents with    Eczema     Pt presents for f/u. Pt stable on dupixent.  Denies any current flare ups.     Medication Refill     Pt needs refills on topicals     Hx of severe atopic dermatitis and allergies to fish, walnuts and stone fruit, last seen on 11/18/24.  On dupixent intermittent since 2019.  Currently doing well. + improvement in skin.  Has few residual areas on leg, has improved with triamcinolone and tacrolimus ointment qD.    Prior treatments: dupixent since 2019, betamethasone, NBUVB, triamcinonlone, epiceram, tacrolimus    Denies hx of cold sores, conjunctivitis, painful vision or sores in or near the eye.         Eczema    Medication Refill        Review of Systems   Constitutional:  Negative for fever and chills.   HENT:  Negative for mouth sores.    Eyes:  Negative for itching, eye watering, eye irritation and eyelid inflammation.   Respiratory:  Negative for shortness of breath.    Gastrointestinal:  Negative for nausea, vomiting and diarrhea.   Genitourinary:  Negative for genital sores.   Skin:  Positive for itching, rash and activity-related sunscreen use. Negative for daily sunscreen use and recent sunburn.   Hematologic/Lymphatic: Does not bruise/bleed easily.   Allergic/Immunologic: Positive for environmental allergies.        Objective:    Physical Exam   Constitutional: He appears well-developed and well-nourished. No distress.   Neurological: He is alert and oriented to person, place, and time. He is not disoriented.   Psychiatric: He has a normal mood and affect.   Skin:   Areas Examined (abnormalities noted in diagram):   Head / Face Inspection Performed  Neck Inspection Performed  RUE Inspected  LUE Inspection Performed  Nails and Digits Inspection Performed             Assessment / Plan:      Other atopic dermatitis  Encounter for long-term (current) use of medications  -      tacrolimus (PROTOPIC) 0.1 % ointment; AAA bid prn eczema flares. Non-steroid, safe to use long-term.  Dispense: 60 g; Refill: 11  -     triamcinolone acetonide 0.1% (KENALOG) 0.1 % ointment; AAA bid prn. Do not use on face, underarms or groin. Mild steroid. Use for 1-2 weeks and then taper.  Dispense: 80 g; Refill: 5  -     reviewed CBC, CMP from 11/18/2024, significant for mild anemia and increased eosinophils.  Plan to continue Dupixent. EASI score = 0 (from baseline of 25).  Refill provided for Dupixent in 11/24.  We will also refill topical medications.    Reviewed side effects of immunosuppression, conjunctivitis/keratitis and reactivation of the herpes virus.  Discussed that if patient develops sores, vesicles, or ulcerations near the eye, then he should present to the emergency room for evaluation and ophthalmology consultation.  The patient acknowledged understanding and wishes to proceed with Dupixent therapy.            Follow up in about 1 year (around 2/5/2026).

## 2025-03-06 DIAGNOSIS — I10 PRIMARY HYPERTENSION: Primary | ICD-10-CM

## 2025-03-07 ENCOUNTER — HOSPITAL ENCOUNTER (OUTPATIENT)
Dept: CARDIOLOGY | Facility: HOSPITAL | Age: 45
Discharge: HOME | End: 2025-03-07
Attending: INTERNAL MEDICINE
Payer: COMMERCIAL

## 2025-03-07 ENCOUNTER — OFFICE VISIT (OUTPATIENT)
Dept: CARDIOLOGY | Facility: CLINIC | Age: 45
End: 2025-03-07
Payer: COMMERCIAL

## 2025-03-07 VITALS
BODY MASS INDEX: 42.01 KG/M2 | WEIGHT: 309.75 LBS | OXYGEN SATURATION: 98 % | DIASTOLIC BLOOD PRESSURE: 82 MMHG | HEART RATE: 85 BPM | SYSTOLIC BLOOD PRESSURE: 118 MMHG

## 2025-03-07 DIAGNOSIS — G47.33 OSA (OBSTRUCTIVE SLEEP APNEA): ICD-10-CM

## 2025-03-07 DIAGNOSIS — E78.49 OTHER HYPERLIPIDEMIA: ICD-10-CM

## 2025-03-07 DIAGNOSIS — R07.9 CHEST PAIN, UNSPECIFIED TYPE: ICD-10-CM

## 2025-03-07 DIAGNOSIS — E11.69 TYPE 2 DIABETES MELLITUS WITH OTHER SPECIFIED COMPLICATION, UNSPECIFIED WHETHER LONG TERM INSULIN USE: ICD-10-CM

## 2025-03-07 DIAGNOSIS — I10 PRIMARY HYPERTENSION: ICD-10-CM

## 2025-03-07 DIAGNOSIS — R94.31 NONSPECIFIC ABNORMAL ELECTROCARDIOGRAM (ECG) (EKG): ICD-10-CM

## 2025-03-07 DIAGNOSIS — E66.01 SEVERE OBESITY (BMI >= 40): Primary | ICD-10-CM

## 2025-03-07 LAB
OHS QRS DURATION: 90 MS
OHS QTC CALCULATION: 434 MS

## 2025-03-07 PROCEDURE — 93005 ELECTROCARDIOGRAM TRACING: CPT

## 2025-03-07 PROCEDURE — 93010 ELECTROCARDIOGRAM REPORT: CPT | Mod: ,,, | Performed by: INTERNAL MEDICINE

## 2025-03-07 PROCEDURE — 99999 PR PBB SHADOW E&M-EST. PATIENT-LVL III: CPT | Mod: PBBFAC,,, | Performed by: INTERNAL MEDICINE

## 2025-03-07 NOTE — PROGRESS NOTES
Subjective:   Patient ID:  Krish Ramos is a 44 y.o. male who presents for cardiac consult of Follow-up    Referring physician: Rohit Gomez MD   Reason for consult: abnormal ECG    HPI  The patient came in today for cardiac consult of Follow-up      Krish Ramos is a 44 y.o. male pt with HTN, HLD, DM2, obesity BMI > 40, LOLLY presents for follow up CV eval.         3/8/24  BP elevated 140s/90s. . BMI 45 - 314 lbs.   BP improved in office.     8/23/24  Pt has been having chest pain only at night, no nausea. No SOB.   Uses CPAP at night.     12/2/24  BP and HR stable. BMI 42 - 315 lbs   ECHO and Nuc stress neg 9/2024.   He has not been losing much weight wants to try Mounjaro.     3/7/25  BP and HR stable. BMI 42 - 309 lbs  Is losing weight with Mounjaro.   Will increase dose     Patient has fairly good exercise tolerance. Works for remotely for [employer]    Patient is compliant with medications.    FH - father - HTN, DM - 75    Results for orders placed during the hospital encounter of 09/17/24    Echo    Interpretation Summary    Left Ventricle: The left ventricle is normal in size. Normal wall thickness. There is normal systolic function with a visually estimated ejection fraction of 55 - 60%. There is normal diastolic function.    Right Ventricle: Normal right ventricular cavity size. Systolic function is normal.    Left Atrium: Left atrium is mildly dilated.    IVC/SVC: Normal venous pressure at 3 mmHg.    Results for orders placed during the hospital encounter of 09/17/24    Nuclear Stress - Cardiology Interpreted    Interpretation Summary    Normal myocardial perfusion scan. There is no evidence of myocardial ischemia or infarction.    The gated perfusion images showed an ejection fraction of 57% at rest. The gated perfusion images showed an ejection fraction of 61% post stress.    The ECG portion of the study is negative for ischemia.    The patient reported no chest  pain during the stress test.    There were no arrhythmias during stress.        Results for orders placed during the hospital encounter of 04/21/23    Echo    Interpretation Summary  · The left ventricle is normal in size with mild concentric hypertrophy and normal systolic function.  · The estimated ejection fraction is 65%.  · Normal left ventricular diastolic function.  · Normal right ventricular size with normal right ventricular systolic function.  · Mild tricuspid regurgitation.  · Intermediate central venous pressure (8 mmHg).  · The estimated PA systolic pressure is 25 mmHg.      Results for orders placed during the hospital encounter of 04/21/23    Exercise Stress - EKG    Interpretation Summary    The ECG portion of the study is negative for ischemia.    The patient reported no chest pain during the stress test.    The blood pressure response to stress was normal.    The exercise capacity was above average.    The patient exercised for 6 minutes 0 seconds on a Felipe protocol, corresponding to a functional capacity of 7 METS, achieving a peak heart rate of 171 bpm, which is 96 % of the age predicted maximum heart rate. Their exercise capacity was above average.      ECG  Normal sinus rhythm   Inferior infarct ,age undetermined   Abnormal ECG   No previous ECGs available   Confirmed by MANJINDER ALANIZ MD ([id number]) on 3/10/2023 11:45:51 AM     Referred By: MONTEZ DEL RIO           Confirmed By:MANJINDER ALANIZ MD    Past Medical History:   Diagnosis Date    Diabetes mellitus     Eczema     HLD (hyperlipidemia)     Primary hypertension        Past Surgical History:   Procedure Laterality Date    FEMUR FRACTURE SURGERY Right        Social History     Tobacco Use    Smoking status: Never    Smokeless tobacco: Never   Substance Use Topics    Alcohol use: Not Currently    Drug use: Never       No family history on file.    Patient's Medications   New Prescriptions    No medications on file   Previous Medications    BLOOD  SUGAR DIAGNOSTIC STRP    Use with glucometer daily as needed. Please send accu-chek dawit strips.    CELECOXIB (CELEBREX) 200 MG CAPSULE    Take 200-400 mg by mouth daily as needed.    DUPIXENT SYRINGE 300 MG/2 ML SYRG    INJECT 1 SYRINGE UNDER THE SKIN EVERY 14 DAYS    EPINEPHRINE (EPIPEN) 0.3 MG/0.3 ML ATIN    Inject 0.3 mLs (0.3 mg total) into the muscle once. for 1 dose    LANCETS (ACCU-CHEK SOFTCLIX LANCETS) MISC    1 each by Misc.(Non-Drug; Combo Route) route 2 (two) times a day.    OLMESARTAN-AMLODIPIN-HCTHIAZID 40-10-25 MG TAB    Take 1 tablet by mouth once daily.    TACROLIMUS (PROTOPIC) 0.1 % OINTMENT    AAA bid prn eczema flares. Non-steroid, safe to use long-term.    TIRZEPATIDE (MOUNJARO) 5 MG/0.5 ML PNIJ    Inject 5 mg into the skin every 7 days. Stop Ozempic 1 week prior to this, call office in 6 weeks to increase dose    TRIAMCINOLONE ACETONIDE 0.1% (KENALOG) 0.1 % OINTMENT    AAA bid prn. Do not use on face, underarms or groin. Mild steroid. Use for 1-2 weeks and then taper.   Modified Medications    No medications on file   Discontinued Medications    No medications on file       Review of Systems   Constitutional: Negative.    HENT: Negative.     Eyes: Negative.    Respiratory: Negative.     Cardiovascular: Negative.    Gastrointestinal: Negative.    Genitourinary: Negative.    Musculoskeletal: Negative.    Skin: Negative.    Neurological: Negative.    Endo/Heme/Allergies: Negative.    Psychiatric/Behavioral: Negative.     All 12 systems otherwise negative.      Wt Readings from Last 3 Encounters:   03/07/25 (!) 140.5 kg (309 lb 11.9 oz)   01/09/25 (!) 140.6 kg (309 lb 15.5 oz)   12/04/24 (!) 142.9 kg (315 lb 0.6 oz)     Temp Readings from Last 3 Encounters:   01/09/25 96.9 °F (36.1 °C) (Tympanic)   01/30/24 99.2 °F (37.3 °C) (Tympanic)   09/20/21 98.1 °F (36.7 °C) (Temporal)     BP Readings from Last 3 Encounters:   03/07/25 118/82   01/09/25 124/80   12/04/24 118/84     Pulse Readings from Last 3  Encounters:   03/07/25 85   01/09/25 97   12/04/24 87       /82 (BP Location: Right arm, Patient Position: Sitting)   Pulse 85   Wt (!) 140.5 kg (309 lb 11.9 oz)   SpO2 98%   BMI 42.01 kg/m²     Objective:   Physical Exam  Vitals and nursing note reviewed.   Constitutional:       General: He is not in acute distress.     Appearance: He is well-developed. He is obese. He is not diaphoretic.   HENT:      Head: Normocephalic and atraumatic.      Nose: Nose normal.   Eyes:      General: No scleral icterus.     Conjunctiva/sclera: Conjunctivae normal.   Neck:      Thyroid: No thyromegaly.      Vascular: No JVD.   Cardiovascular:      Rate and Rhythm: Normal rate and regular rhythm.      Heart sounds: S1 normal and S2 normal. No murmur heard.     No friction rub. No gallop. No S3 or S4 sounds.   Pulmonary:      Effort: Pulmonary effort is normal. No respiratory distress.      Breath sounds: Normal breath sounds. No stridor. No wheezing or rales.   Chest:      Chest wall: No tenderness.   Abdominal:      General: Bowel sounds are normal. There is no distension.      Palpations: Abdomen is soft. There is no mass.      Tenderness: There is no abdominal tenderness. There is no rebound.   Genitourinary:     Comments: Deferred  Musculoskeletal:         General: No tenderness or deformity. Normal range of motion.      Cervical back: Normal range of motion and neck supple.   Lymphadenopathy:      Cervical: No cervical adenopathy.   Skin:     General: Skin is warm and dry.      Coloration: Skin is not pale.      Findings: No erythema or rash.   Neurological:      Mental Status: He is alert and oriented to person, place, and time.      Motor: No abnormal muscle tone.      Coordination: Coordination normal.   Psychiatric:         Behavior: Behavior normal.         Thought Content: Thought content normal.         Judgment: Judgment normal.         Lab Results   Component Value Date     11/18/2024    K 3.9 11/18/2024      11/18/2024    CO2 24 11/18/2024    BUN 14 11/18/2024    CREATININE 0.9 11/18/2024    GLU 83 11/18/2024    HGBA1C 5.5 01/09/2025    AST 21 11/18/2024    ALT 18 11/18/2024    ALBUMIN 3.8 11/18/2024    PROT 8.4 11/18/2024    BILITOT 0.7 11/18/2024    WBC 6.57 11/18/2024    HGB 13.4 (L) 11/18/2024    HCT 39.8 (L) 11/18/2024    MCV 90 11/18/2024     11/18/2024    TSH 0.717 02/19/2024    CHOL 192 02/19/2024    HDL 44 02/19/2024    LDLCALC 132.6 02/19/2024    TRIG 77 02/19/2024     Assessment:      1. Severe obesity (BMI >= 40)    2. Primary hypertension    3. Type 2 diabetes mellitus with other specified complication, unspecified whether long term insulin use    4. Nonspecific abnormal electrocardiogram (ECG) (EKG)    5. Chest pain, unspecified type    6. LOLLY (obstructive sleep apnea)    7. Other hyperlipidemia            Plan:     Abnormal ECG - inferior Q waves with CP more at night - stable   - ECHO 4/2023 with normal bi V function, mild TR, PASP 25 mmHg. ECG stress test 4/2023 neg for ischemia.   - ECHO and Nuc stress neg 9/2024.   - r/o non cardiac etiologies as well;    2. HTN  - titrate meds    3. HLD  - needs improvement   - cont low miri diet     4. LOLLY, moderate  - cont CPAP    5. DM2 A1c 7.2 --> 5.7 --> 5.5   - cont tx, needs improvement   - diet controlled   -cont Ozempic 2mg change to Mounjaro 5 --> increase to 7.5 mg     6. Obesity, BMI 45 -->  BMI 44 - 327 lbs. --> 314 lbs --> . BMI 42 - 315 lbs  BMI 42 - 309 lbs  - cont weight loss     Visit today included increased complexity associated with the care of the episodic problem chest pain addressed and managing the longitudinal care of the patient due to the serious and/or complex managed problem(s) .      Thank you for allowing me to participate in this patient's care. Please do not hesitate to contact me with any questions or concerns. Consult note has been forwarded to the referral physician.

## 2025-04-04 ENCOUNTER — TELEPHONE (OUTPATIENT)
Dept: RESEARCH | Facility: HOSPITAL | Age: 45
End: 2025-04-04
Payer: COMMERCIAL

## 2025-04-04 NOTE — TELEPHONE ENCOUNTER
Rosa Study  Study ID#[id number]    Left voicemail reminding patient to complete his Month 5 questionnaires which are due on April 8th by 11pm central standard time.     Email reminder was also sent to patient.

## 2025-05-23 ENCOUNTER — LAB VISIT (OUTPATIENT)
Dept: LAB | Facility: HOSPITAL | Age: 45
End: 2025-05-23
Attending: INTERNAL MEDICINE
Payer: COMMERCIAL

## 2025-05-23 DIAGNOSIS — E78.49 OTHER HYPERLIPIDEMIA: ICD-10-CM

## 2025-05-23 LAB
CHOLEST SERPL-MCNC: 231 MG/DL (ref 120–199)
CHOLEST/HDLC SERPL: 5.6 {RATIO} (ref 2–5)
HDLC SERPL-MCNC: 41 MG/DL (ref 40–75)
HDLC SERPL: 17.7 % (ref 20–50)
LDLC SERPL CALC-MCNC: 158.4 MG/DL (ref 63–159)
NONHDLC SERPL-MCNC: 190 MG/DL
TRIGL SERPL-MCNC: 158 MG/DL (ref 30–150)

## 2025-05-23 PROCEDURE — 36415 COLL VENOUS BLD VENIPUNCTURE: CPT

## 2025-05-23 PROCEDURE — 82465 ASSAY BLD/SERUM CHOLESTEROL: CPT

## 2025-05-26 ENCOUNTER — RESULTS FOLLOW-UP (OUTPATIENT)
Dept: CARDIOLOGY | Facility: CLINIC | Age: 45
End: 2025-05-26

## 2025-06-10 ENCOUNTER — PATIENT MESSAGE (OUTPATIENT)
Dept: DERMATOLOGY | Facility: CLINIC | Age: 45
End: 2025-06-10
Payer: COMMERCIAL

## 2025-06-11 ENCOUNTER — OFFICE VISIT (OUTPATIENT)
Dept: CARDIOLOGY | Facility: CLINIC | Age: 45
End: 2025-06-11
Payer: COMMERCIAL

## 2025-06-11 VITALS
BODY MASS INDEX: 42.66 KG/M2 | SYSTOLIC BLOOD PRESSURE: 119 MMHG | OXYGEN SATURATION: 97 % | HEIGHT: 72 IN | DIASTOLIC BLOOD PRESSURE: 80 MMHG | WEIGHT: 315 LBS | HEART RATE: 90 BPM

## 2025-06-11 DIAGNOSIS — Z86.16 HISTORY OF COVID-19: ICD-10-CM

## 2025-06-11 DIAGNOSIS — G47.33 OSA (OBSTRUCTIVE SLEEP APNEA): ICD-10-CM

## 2025-06-11 DIAGNOSIS — E66.01 SEVERE OBESITY (BMI >= 40): ICD-10-CM

## 2025-06-11 DIAGNOSIS — R07.9 CHEST PAIN, UNSPECIFIED TYPE: ICD-10-CM

## 2025-06-11 DIAGNOSIS — E11.69 TYPE 2 DIABETES MELLITUS WITH OTHER SPECIFIED COMPLICATION, UNSPECIFIED WHETHER LONG TERM INSULIN USE: ICD-10-CM

## 2025-06-11 DIAGNOSIS — I10 PRIMARY HYPERTENSION: ICD-10-CM

## 2025-06-11 DIAGNOSIS — E78.49 OTHER HYPERLIPIDEMIA: ICD-10-CM

## 2025-06-11 DIAGNOSIS — R94.31 NONSPECIFIC ABNORMAL ELECTROCARDIOGRAM (ECG) (EKG): Primary | ICD-10-CM

## 2025-06-11 DIAGNOSIS — Z82.49 FAMILY HISTORY OF CARDIAC DISORDER: ICD-10-CM

## 2025-06-11 DIAGNOSIS — G47.33 OSA ON CPAP: ICD-10-CM

## 2025-06-11 PROCEDURE — 3079F DIAST BP 80-89 MM HG: CPT | Mod: CPTII,S$GLB,, | Performed by: INTERNAL MEDICINE

## 2025-06-11 PROCEDURE — 3008F BODY MASS INDEX DOCD: CPT | Mod: CPTII,S$GLB,, | Performed by: INTERNAL MEDICINE

## 2025-06-11 PROCEDURE — G2211 COMPLEX E/M VISIT ADD ON: HCPCS | Mod: S$GLB,,, | Performed by: INTERNAL MEDICINE

## 2025-06-11 PROCEDURE — 3074F SYST BP LT 130 MM HG: CPT | Mod: CPTII,S$GLB,, | Performed by: INTERNAL MEDICINE

## 2025-06-11 PROCEDURE — 3044F HG A1C LEVEL LT 7.0%: CPT | Mod: CPTII,S$GLB,, | Performed by: INTERNAL MEDICINE

## 2025-06-11 PROCEDURE — 1159F MED LIST DOCD IN RCRD: CPT | Mod: CPTII,S$GLB,, | Performed by: INTERNAL MEDICINE

## 2025-06-11 PROCEDURE — 3066F NEPHROPATHY DOC TX: CPT | Mod: CPTII,S$GLB,, | Performed by: INTERNAL MEDICINE

## 2025-06-11 PROCEDURE — 3061F NEG MICROALBUMINURIA REV: CPT | Mod: CPTII,S$GLB,, | Performed by: INTERNAL MEDICINE

## 2025-06-11 PROCEDURE — 99214 OFFICE O/P EST MOD 30 MIN: CPT | Mod: S$GLB,,, | Performed by: INTERNAL MEDICINE

## 2025-06-11 PROCEDURE — 99999 PR PBB SHADOW E&M-EST. PATIENT-LVL III: CPT | Mod: PBBFAC,,, | Performed by: INTERNAL MEDICINE

## 2025-06-11 PROCEDURE — 3072F LOW RISK FOR RETINOPATHY: CPT | Mod: CPTII,S$GLB,, | Performed by: INTERNAL MEDICINE

## 2025-06-11 PROCEDURE — 1160F RVW MEDS BY RX/DR IN RCRD: CPT | Mod: CPTII,S$GLB,, | Performed by: INTERNAL MEDICINE

## 2025-06-11 RX ORDER — TIRZEPATIDE 10 MG/.5ML
10 INJECTION, SOLUTION SUBCUTANEOUS
Qty: 6 ML | Refills: 1 | Status: SHIPPED | OUTPATIENT
Start: 2025-06-11

## 2025-06-11 NOTE — PROGRESS NOTES
Subjective:   Patient ID:  Krish Ramos is a 45 y.o. male who presents for cardiac consult of No chief complaint on file.    Referring physician: Rohit Gomez MD   Reason for consult: abnormal ECG    HPI  The patient came in today for cardiac consult of No chief complaint on file.      Krsih Ramos is a 45 y.o. male pt with HTN, HLD, DM2, obesity BMI > 40, LOLLY presents for follow up CV eval.     12/2/24  BP and HR stable. BMI 42 - 315 lbs   ECHO and Nuc stress neg 9/2024.   He has not been losing much weight wants to try Mounjaro.     3/7/25  BP and HR stable. BMI 42 - 309 lbs  Is losing weight with Mounjaro.   Will increase dose     6/11/25  BP and HR stable. BMI 43 - 323 lbs  Has gained weight lately..  Lipids are worse 5/2025 - Tc 231, , Tg 158  He was out of Mounjaro for 3 weeks.     Works for [employer] for [employer] program        FH - father - HTN, DM - 75; had CABG x 4    Results for orders placed during the hospital encounter of 09/17/24    Echo    Interpretation Summary    Left Ventricle: The left ventricle is normal in size. Normal wall thickness. There is normal systolic function with a visually estimated ejection fraction of 55 - 60%. There is normal diastolic function.    Right Ventricle: Normal right ventricular cavity size. Systolic function is normal.    Left Atrium: Left atrium is mildly dilated.    IVC/SVC: Normal venous pressure at 3 mmHg.    Results for orders placed during the hospital encounter of 09/17/24    Nuclear Stress - Cardiology Interpreted    Interpretation Summary    Normal myocardial perfusion scan. There is no evidence of myocardial ischemia or infarction.    The gated perfusion images showed an ejection fraction of 57% at rest. The gated perfusion images showed an ejection fraction of 61% post stress.    The ECG portion of the study is negative for ischemia.    The patient reported no chest pain during the stress test.    There were no arrhythmias  during stress.        Results for orders placed during the hospital encounter of 04/21/23    Echo    Interpretation Summary  · The left ventricle is normal in size with mild concentric hypertrophy and normal systolic function.  · The estimated ejection fraction is 65%.  · Normal left ventricular diastolic function.  · Normal right ventricular size with normal right ventricular systolic function.  · Mild tricuspid regurgitation.  · Intermediate central venous pressure (8 mmHg).  · The estimated PA systolic pressure is 25 mmHg.      Results for orders placed during the hospital encounter of 04/21/23    Exercise Stress - EKG    Interpretation Summary    The ECG portion of the study is negative for ischemia.    The patient reported no chest pain during the stress test.    The blood pressure response to stress was normal.    The exercise capacity was above average.    The patient exercised for 6 minutes 0 seconds on a Felipe protocol, corresponding to a functional capacity of 7 METS, achieving a peak heart rate of 171 bpm, which is 96 % of the age predicted maximum heart rate. Their exercise capacity was above average.      ECG  Normal sinus rhythm   Inferior infarct ,age undetermined   Abnormal ECG   No previous ECGs available   Confirmed by MANJINDER ALANIZ MD ([id number]) on 3/10/2023 11:45:51 AM     Referred By: MONTEZ DEL RIO           Confirmed By:MANJINDER ALANIZ MD    Past Medical History:   Diagnosis Date    Diabetes mellitus     Eczema     HLD (hyperlipidemia)     Primary hypertension        Past Surgical History:   Procedure Laterality Date    FEMUR FRACTURE SURGERY Right        Social History     Tobacco Use    Smoking status: Never    Smokeless tobacco: Never   Substance Use Topics    Alcohol use: Not Currently    Drug use: Never       No family history on file.    Patient's Medications   New Prescriptions    No medications on file   Previous Medications    BLOOD SUGAR DIAGNOSTIC STRP    Use with glucometer daily as needed.  Please send accu-chek dawit strips.    CELECOXIB (CELEBREX) 200 MG CAPSULE    Take 200-400 mg by mouth daily as needed.    DUPIXENT SYRINGE 300 MG/2 ML SYRG    INJECT 1 SYRINGE UNDER THE SKIN EVERY 14 DAYS    EPINEPHRINE (EPIPEN) 0.3 MG/0.3 ML ATIN    Inject 0.3 mLs (0.3 mg total) into the muscle once. for 1 dose    LANCETS (ACCU-CHEK SOFTCLIX LANCETS) MISC    1 each by Misc.(Non-Drug; Combo Route) route 2 (two) times a day.    OLMESARTAN-AMLODIPIN-HCTHIAZID 40-10-25 MG TAB    Take 1 tablet by mouth once daily.    TACROLIMUS (PROTOPIC) 0.1 % OINTMENT    AAA bid prn eczema flares. Non-steroid, safe to use long-term.    TIRZEPATIDE 7.5 MG/0.5 ML PNIJ    Inject 7.5 mg into the skin every 7 days. Call office in 6-8 weeks to increase dose    TRIAMCINOLONE ACETONIDE 0.1% (KENALOG) 0.1 % OINTMENT    AAA bid prn. Do not use on face, underarms or groin. Mild steroid. Use for 1-2 weeks and then taper.   Modified Medications    No medications on file   Discontinued Medications    No medications on file       Review of Systems   Constitutional: Negative.    HENT: Negative.     Eyes: Negative.    Respiratory: Negative.     Cardiovascular: Negative.    Gastrointestinal: Negative.    Genitourinary: Negative.    Musculoskeletal: Negative.    Skin: Negative.    Neurological: Negative.    Endo/Heme/Allergies: Negative.    Psychiatric/Behavioral: Negative.     All 12 systems otherwise negative.      Wt Readings from Last 3 Encounters:   06/11/25 (!) 146.7 kg (323 lb 6.6 oz)   03/07/25 (!) 140.5 kg (309 lb 11.9 oz)   01/09/25 (!) 140.6 kg (309 lb 15.5 oz)     Temp Readings from Last 3 Encounters:   01/09/25 96.9 °F (36.1 °C) (Tympanic)   01/30/24 99.2 °F (37.3 °C) (Tympanic)   09/20/21 98.1 °F (36.7 °C) (Temporal)     BP Readings from Last 3 Encounters:   06/11/25 119/80   03/07/25 118/82   01/09/25 124/80     Pulse Readings from Last 3 Encounters:   06/11/25 90   03/07/25 85   01/09/25 97       /80 (BP Location: Left arm, Patient  Position: Sitting)   Pulse 90   Ht 6' (1.829 m)   Wt (!) 146.7 kg (323 lb 6.6 oz)   SpO2 97%   BMI 43.86 kg/m²     Objective:   Physical Exam  Vitals and nursing note reviewed.   Constitutional:       General: He is not in acute distress.     Appearance: He is well-developed. He is obese. He is not diaphoretic.   HENT:      Head: Normocephalic and atraumatic.      Nose: Nose normal.   Eyes:      General: No scleral icterus.     Conjunctiva/sclera: Conjunctivae normal.   Neck:      Thyroid: No thyromegaly.      Vascular: No JVD.   Cardiovascular:      Rate and Rhythm: Normal rate and regular rhythm.      Heart sounds: S1 normal and S2 normal. No murmur heard.     No friction rub. No gallop. No S3 or S4 sounds.   Pulmonary:      Effort: Pulmonary effort is normal. No respiratory distress.      Breath sounds: Normal breath sounds. No stridor. No wheezing or rales.   Chest:      Chest wall: No tenderness.   Abdominal:      General: Bowel sounds are normal. There is no distension.      Palpations: Abdomen is soft. There is no mass.      Tenderness: There is no abdominal tenderness. There is no rebound.   Genitourinary:     Comments: Deferred  Musculoskeletal:         General: No tenderness or deformity. Normal range of motion.      Cervical back: Normal range of motion and neck supple.   Lymphadenopathy:      Cervical: No cervical adenopathy.   Skin:     General: Skin is warm and dry.      Coloration: Skin is not pale.      Findings: No erythema or rash.   Neurological:      Mental Status: He is alert and oriented to person, place, and time.      Motor: No abnormal muscle tone.      Coordination: Coordination normal.   Psychiatric:         Behavior: Behavior normal.         Thought Content: Thought content normal.         Judgment: Judgment normal.         Lab Results   Component Value Date     11/18/2024    K 3.9 11/18/2024     11/18/2024    CO2 24 11/18/2024    BUN 14 11/18/2024    CREATININE 0.9  11/18/2024    GLU 83 11/18/2024    HGBA1C 5.5 01/09/2025    AST 21 11/18/2024    ALT 18 11/18/2024    ALBUMIN 3.8 11/18/2024    PROT 8.4 11/18/2024    BILITOT 0.7 11/18/2024    WBC 6.57 11/18/2024    HGB 13.4 (L) 11/18/2024    HCT 39.8 (L) 11/18/2024    MCV 90 11/18/2024     11/18/2024    TSH 0.717 02/19/2024    CHOL 231 (H) 05/23/2025    CHOL 192 02/19/2024    HDL 41 05/23/2025    LDLCALC 158.4 05/23/2025    TRIG 158 (H) 05/23/2025    TRIG 77 02/19/2024     Assessment:      1. Nonspecific abnormal electrocardiogram (ECG) (EKG)    2. Primary hypertension    3. Severe obesity (BMI >= 40)    4. Chest pain, unspecified type    5. Type 2 diabetes mellitus with other specified complication, unspecified whether long term insulin use    6. LOLLY (obstructive sleep apnea)    7. Other hyperlipidemia            Plan:     Abnormal ECG - inferior Q waves with CP more at night - stable ; FH CAD - father 4 vessel CABG  - ECHO 4/2023 with normal bi V function, mild TR, PASP 25 mmHg. ECG stress test 4/2023 neg for ischemia.   - ECHO and Nuc stress neg 9/2024.   - r/o non cardiac etiologies as well    2. HTN  - titrate meds    3. HLD - goal < 70   - needs improvement   - cont low miri diet   - Lipids are worse 5/2025 - Tc 231, , Tg 158  - discussed start Lipitor 20mg  - will start in a few months - after repeat labs Lipids and LpA    4. LOLLY, moderate  - cont CPAP    5. DM2 A1c 7.2 --> 5.7 --> 5.5   - cont tx, needs improvement   - diet controlled   - cont Ozempic 2mg change to Mounjaro 5 --> increase to 7.5 mg --> increase to 10mg     6. Obesity, BMI 45 -->  BMI 44 - 327 lbs. --> 314 lbs -->  BMI 42 - 315 lbs  BMI 42 - 309 lbs BMI 43 - 323 lbs  - cont weight loss     Visit today included increased complexity associated with the care of the episodic problem chest pain addressed and managing the longitudinal care of the patient due to the serious and/or complex managed problem(s) .      Thank you for allowing me to  participate in this patient's care. Please do not hesitate to contact me with any questions or concerns. Consult note has been forwarded to the referral physician.

## 2025-07-07 ENCOUNTER — OFFICE VISIT (OUTPATIENT)
Dept: OPHTHALMOLOGY | Facility: CLINIC | Age: 45
End: 2025-07-07
Payer: COMMERCIAL

## 2025-07-07 DIAGNOSIS — H52.13 MYOPIA, BILATERAL: ICD-10-CM

## 2025-07-07 DIAGNOSIS — H52.212 IRREGULAR ASTIGMATISM OF LEFT EYE: ICD-10-CM

## 2025-07-07 DIAGNOSIS — H52.221 REGULAR ASTIGMATISM OF RIGHT EYE: ICD-10-CM

## 2025-07-07 DIAGNOSIS — E11.9 TYPE 2 DIABETES MELLITUS WITHOUT RETINOPATHY: Primary | ICD-10-CM

## 2025-07-07 PROCEDURE — 3061F NEG MICROALBUMINURIA REV: CPT | Mod: CPTII,S$GLB,, | Performed by: OPTOMETRIST

## 2025-07-07 PROCEDURE — 92025 CPTRIZED CORNEAL TOPOGRAPHY: CPT | Mod: S$GLB,,, | Performed by: OPTOMETRIST

## 2025-07-07 PROCEDURE — 1159F MED LIST DOCD IN RCRD: CPT | Mod: CPTII,S$GLB,, | Performed by: OPTOMETRIST

## 2025-07-07 PROCEDURE — 92015 DETERMINE REFRACTIVE STATE: CPT | Mod: S$GLB,,, | Performed by: OPTOMETRIST

## 2025-07-07 PROCEDURE — 3044F HG A1C LEVEL LT 7.0%: CPT | Mod: CPTII,S$GLB,, | Performed by: OPTOMETRIST

## 2025-07-07 PROCEDURE — 1160F RVW MEDS BY RX/DR IN RCRD: CPT | Mod: CPTII,S$GLB,, | Performed by: OPTOMETRIST

## 2025-07-07 PROCEDURE — 3066F NEPHROPATHY DOC TX: CPT | Mod: CPTII,S$GLB,, | Performed by: OPTOMETRIST

## 2025-07-07 PROCEDURE — 92014 COMPRE OPH EXAM EST PT 1/>: CPT | Mod: S$GLB,,, | Performed by: OPTOMETRIST

## 2025-07-07 PROCEDURE — 2023F DILAT RTA XM W/O RTNOPTHY: CPT | Mod: CPTII,S$GLB,, | Performed by: OPTOMETRIST

## 2025-07-07 PROCEDURE — 99999 PR PBB SHADOW E&M-EST. PATIENT-LVL III: CPT | Mod: PBBFAC,,, | Performed by: OPTOMETRIST

## 2025-07-07 NOTE — PROGRESS NOTES
HPI     Diabetic Eye Exam            Comments: RTC 1 yr  Vision changes since last eye exam?: no, but has to remove glasses to see   close. OD starting to water more, could be allergies    Any eye pain today: no    Other ocular symptoms: no    Interested in contact lens fitting today? no              Lab Results       Component                Value               Date                       HGBA1C                   5.5                 01/09/2025                             Last edited by Maribel Salvador on 7/7/2025  2:13 PM.            Assessment /Plan     For exam results, see Encounter Report.    Type 2 diabetes mellitus without retinopathy  There was no diabetic retinopathy present in either eye today.   Recommended that pt continue care with PCP and/or specialists regarding diabetes.  Follow-up dilated eye exam recommended in 12 months, sooner with any vision changes or new concerns.    Irregular astigmatism of left eye-possible mild PMD OS  Regular astigmatism of right eye  Myopia, bilateral          Eyeglass Final Rx       Eyeglass Final Rx         Sphere Cylinder Axis    Right -4.50 +2.00 105    Left -5.75 +3.00 030      Type: SVL    Expiration Date: 7/7/2026                    RTC 1 yr for dilated eye exam and K topography or PRN if any problems.   Discussed above and answered questions.

## 2025-08-12 ENCOUNTER — PATIENT MESSAGE (OUTPATIENT)
Dept: CARDIOLOGY | Facility: CLINIC | Age: 45
End: 2025-08-12
Payer: COMMERCIAL

## 2025-08-12 ENCOUNTER — PATIENT MESSAGE (OUTPATIENT)
Dept: FAMILY MEDICINE | Facility: CLINIC | Age: 45
End: 2025-08-12
Payer: COMMERCIAL

## 2025-08-12 ENCOUNTER — TELEPHONE (OUTPATIENT)
Dept: CARDIOLOGY | Facility: CLINIC | Age: 45
End: 2025-08-12
Payer: COMMERCIAL

## 2025-08-13 DIAGNOSIS — E11.9 TYPE 2 DIABETES MELLITUS WITHOUT COMPLICATION: ICD-10-CM

## 2025-08-18 DIAGNOSIS — L20.89 OTHER ATOPIC DERMATITIS: ICD-10-CM

## 2025-08-18 DIAGNOSIS — Z79.899 ENCOUNTER FOR LONG-TERM (CURRENT) USE OF MEDICATIONS: ICD-10-CM

## 2025-08-18 RX ORDER — DUPILUMAB 300 MG/2ML
INJECTION, SOLUTION SUBCUTANEOUS
Qty: 4 ML | Refills: 11 | Status: ACTIVE | OUTPATIENT
Start: 2025-08-18

## 2025-08-28 ENCOUNTER — LAB VISIT (OUTPATIENT)
Dept: LAB | Facility: HOSPITAL | Age: 45
End: 2025-08-28
Attending: INTERNAL MEDICINE
Payer: COMMERCIAL

## 2025-08-28 DIAGNOSIS — Z00.00 ANNUAL PHYSICAL EXAM: ICD-10-CM

## 2025-08-28 LAB
ALBUMIN SERPL BCP-MCNC: 4.3 G/DL (ref 3.5–5.2)
ALP SERPL-CCNC: 64 UNIT/L (ref 40–150)
ALT SERPL W/O P-5'-P-CCNC: 27 UNIT/L (ref 0–55)
AST SERPL-CCNC: 29 UNIT/L (ref 0–50)
BILIRUB DIRECT SERPL-MCNC: 0.3 MG/DL (ref 0.1–0.3)
BILIRUB SERPL-MCNC: 1 MG/DL (ref 0.1–1)
PROT SERPL-MCNC: 9 GM/DL (ref 6–8.4)
PSA SERPL-MCNC: 0.92 NG/ML
TSH SERPL-ACNC: 0.78 UIU/ML (ref 0.4–4)

## 2025-08-28 PROCEDURE — 80076 HEPATIC FUNCTION PANEL: CPT

## 2025-08-28 PROCEDURE — 84443 ASSAY THYROID STIM HORMONE: CPT

## 2025-08-28 PROCEDURE — 84153 ASSAY OF PSA TOTAL: CPT

## 2025-08-28 PROCEDURE — 36415 COLL VENOUS BLD VENIPUNCTURE: CPT | Mod: PO
